# Patient Record
Sex: FEMALE | Race: WHITE | Employment: OTHER | URBAN - METROPOLITAN AREA
[De-identification: names, ages, dates, MRNs, and addresses within clinical notes are randomized per-mention and may not be internally consistent; named-entity substitution may affect disease eponyms.]

---

## 2017-01-01 ENCOUNTER — TELEPHONE (OUTPATIENT)
Dept: GERIATRICS | Facility: CLINIC | Age: 82
End: 2017-01-01

## 2017-01-01 ENCOUNTER — TELEPHONE (OUTPATIENT)
Dept: FAMILY MEDICINE | Facility: CLINIC | Age: 82
End: 2017-01-01

## 2017-01-01 ENCOUNTER — NURSING HOME VISIT (OUTPATIENT)
Dept: GERIATRICS | Facility: CLINIC | Age: 82
End: 2017-01-01
Payer: MEDICARE

## 2017-01-01 ENCOUNTER — APPOINTMENT (OUTPATIENT)
Dept: PHYSICAL THERAPY | Facility: CLINIC | Age: 82
DRG: 065 | End: 2017-01-01
Payer: MEDICARE

## 2017-01-01 ENCOUNTER — APPOINTMENT (OUTPATIENT)
Dept: GENERAL RADIOLOGY | Facility: CLINIC | Age: 82
DRG: 065 | End: 2017-01-01
Attending: EMERGENCY MEDICINE
Payer: MEDICARE

## 2017-01-01 ENCOUNTER — APPOINTMENT (OUTPATIENT)
Dept: CT IMAGING | Facility: CLINIC | Age: 82
DRG: 065 | End: 2017-01-01
Attending: FAMILY MEDICINE
Payer: MEDICARE

## 2017-01-01 ENCOUNTER — OFFICE VISIT (OUTPATIENT)
Dept: AUDIOLOGY | Facility: CLINIC | Age: 82
End: 2017-01-01
Payer: MEDICARE

## 2017-01-01 ENCOUNTER — OFFICE VISIT (OUTPATIENT)
Dept: ORTHOPEDICS | Facility: CLINIC | Age: 82
End: 2017-01-01
Payer: MEDICARE

## 2017-01-01 ENCOUNTER — OFFICE VISIT (OUTPATIENT)
Dept: FAMILY MEDICINE | Facility: CLINIC | Age: 82
End: 2017-01-01
Payer: MEDICARE

## 2017-01-01 ENCOUNTER — CARE COORDINATION (OUTPATIENT)
Dept: CARE COORDINATION | Facility: CLINIC | Age: 82
End: 2017-01-01

## 2017-01-01 ENCOUNTER — APPOINTMENT (OUTPATIENT)
Dept: CT IMAGING | Facility: CLINIC | Age: 82
DRG: 065 | End: 2017-01-01
Attending: EMERGENCY MEDICINE
Payer: MEDICARE

## 2017-01-01 ENCOUNTER — HOSPITAL ENCOUNTER (INPATIENT)
Facility: CLINIC | Age: 82
LOS: 5 days | Discharge: HOSPICE | DRG: 065 | End: 2017-06-01
Attending: EMERGENCY MEDICINE | Admitting: FAMILY MEDICINE
Payer: MEDICARE

## 2017-01-01 ENCOUNTER — ALLIED HEALTH/NURSE VISIT (OUTPATIENT)
Dept: FAMILY MEDICINE | Facility: CLINIC | Age: 82
End: 2017-01-01
Payer: MEDICARE

## 2017-01-01 ENCOUNTER — MEDICAL CORRESPONDENCE (OUTPATIENT)
Dept: HEALTH INFORMATION MANAGEMENT | Facility: CLINIC | Age: 82
End: 2017-01-01

## 2017-01-01 ENCOUNTER — APPOINTMENT (OUTPATIENT)
Dept: GENERAL RADIOLOGY | Facility: CLINIC | Age: 82
DRG: 065 | End: 2017-01-01
Attending: FAMILY MEDICINE
Payer: MEDICARE

## 2017-01-01 ENCOUNTER — APPOINTMENT (OUTPATIENT)
Dept: OCCUPATIONAL THERAPY | Facility: CLINIC | Age: 82
DRG: 065 | End: 2017-01-01
Payer: MEDICARE

## 2017-01-01 ENCOUNTER — OFFICE VISIT (OUTPATIENT)
Dept: NEUROLOGY | Facility: CLINIC | Age: 82
End: 2017-01-01
Payer: MEDICARE

## 2017-01-01 VITALS
HEART RATE: 59 BPM | DIASTOLIC BLOOD PRESSURE: 56 MMHG | TEMPERATURE: 98 F | RESPIRATION RATE: 18 BRPM | BODY MASS INDEX: 23.85 KG/M2 | OXYGEN SATURATION: 90 % | SYSTOLIC BLOOD PRESSURE: 179 MMHG | WEIGHT: 126.32 LBS | HEIGHT: 61 IN

## 2017-01-01 VITALS
HEIGHT: 61 IN | SYSTOLIC BLOOD PRESSURE: 146 MMHG | DIASTOLIC BLOOD PRESSURE: 80 MMHG | WEIGHT: 125 LBS | TEMPERATURE: 98.7 F | BODY MASS INDEX: 23.6 KG/M2 | HEART RATE: 68 BPM | RESPIRATION RATE: 16 BRPM | OXYGEN SATURATION: 92 %

## 2017-01-01 VITALS
SYSTOLIC BLOOD PRESSURE: 135 MMHG | HEART RATE: 68 BPM | RESPIRATION RATE: 16 BRPM | DIASTOLIC BLOOD PRESSURE: 59 MMHG | BODY MASS INDEX: 21.14 KG/M2 | TEMPERATURE: 98.7 F | HEIGHT: 61 IN | OXYGEN SATURATION: 92 % | WEIGHT: 112 LBS

## 2017-01-01 VITALS
SYSTOLIC BLOOD PRESSURE: 110 MMHG | DIASTOLIC BLOOD PRESSURE: 63 MMHG | WEIGHT: 121 LBS | HEART RATE: 86 BPM | HEIGHT: 57 IN | BODY MASS INDEX: 26.1 KG/M2

## 2017-01-01 VITALS
OXYGEN SATURATION: 92 % | DIASTOLIC BLOOD PRESSURE: 80 MMHG | RESPIRATION RATE: 16 BRPM | HEART RATE: 68 BPM | TEMPERATURE: 98.7 F | BODY MASS INDEX: 23.03 KG/M2 | HEIGHT: 61 IN | WEIGHT: 122 LBS | SYSTOLIC BLOOD PRESSURE: 146 MMHG

## 2017-01-01 VITALS
HEIGHT: 57 IN | TEMPERATURE: 98.4 F | HEART RATE: 73 BPM | SYSTOLIC BLOOD PRESSURE: 148 MMHG | BODY MASS INDEX: 26.54 KG/M2 | DIASTOLIC BLOOD PRESSURE: 70 MMHG | WEIGHT: 123 LBS

## 2017-01-01 VITALS
HEART RATE: 81 BPM | DIASTOLIC BLOOD PRESSURE: 79 MMHG | WEIGHT: 127.3 LBS | TEMPERATURE: 97.2 F | SYSTOLIC BLOOD PRESSURE: 149 MMHG | BODY MASS INDEX: 27.46 KG/M2 | HEIGHT: 57 IN | OXYGEN SATURATION: 96 %

## 2017-01-01 VITALS
WEIGHT: 123 LBS | HEIGHT: 57 IN | DIASTOLIC BLOOD PRESSURE: 70 MMHG | BODY MASS INDEX: 26.54 KG/M2 | SYSTOLIC BLOOD PRESSURE: 145 MMHG

## 2017-01-01 DIAGNOSIS — R29.6 FALLS FREQUENTLY: ICD-10-CM

## 2017-01-01 DIAGNOSIS — R29.6 FALLING: ICD-10-CM

## 2017-01-01 DIAGNOSIS — Z71.89 ENCOUNTER FOR HERB AND VITAMIN SUPPLEMENT MANAGEMENT: ICD-10-CM

## 2017-01-01 DIAGNOSIS — L21.9 SEBORRHEIC DERMATITIS OF SCALP: ICD-10-CM

## 2017-01-01 DIAGNOSIS — R44.3 HALLUCINATIONS: Primary | ICD-10-CM

## 2017-01-01 DIAGNOSIS — M17.0 PRIMARY OSTEOARTHRITIS OF BOTH KNEES: ICD-10-CM

## 2017-01-01 DIAGNOSIS — Z51.5 HOSPICE CARE PATIENT: ICD-10-CM

## 2017-01-01 DIAGNOSIS — F03.92: ICD-10-CM

## 2017-01-01 DIAGNOSIS — K59.00 CONSTIPATION, UNSPECIFIED CONSTIPATION TYPE: ICD-10-CM

## 2017-01-01 DIAGNOSIS — H90.3 SENSORINEURAL HEARING LOSS, BILATERAL: Primary | ICD-10-CM

## 2017-01-01 DIAGNOSIS — R52 PAIN: ICD-10-CM

## 2017-01-01 DIAGNOSIS — R44.3 HALLUCINATIONS: ICD-10-CM

## 2017-01-01 DIAGNOSIS — I10 ESSENTIAL HYPERTENSION: ICD-10-CM

## 2017-01-01 DIAGNOSIS — R26.2 DIFFICULTY WALKING: ICD-10-CM

## 2017-01-01 DIAGNOSIS — M51.369 DDD (DEGENERATIVE DISC DISEASE), LUMBAR: ICD-10-CM
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
## 2017-01-01 DIAGNOSIS — R26.81 UNSTEADY GAIT: ICD-10-CM

## 2017-01-01 DIAGNOSIS — Z23 NEED FOR PROPHYLACTIC VACCINATION AGAINST STREPTOCOCCUS PNEUMONIAE (PNEUMOCOCCUS): ICD-10-CM

## 2017-01-01 DIAGNOSIS — H54.7 BLIND: ICD-10-CM

## 2017-01-01 DIAGNOSIS — Z00.00 ROUTINE GENERAL MEDICAL EXAMINATION AT A HEALTH CARE FACILITY: Primary | ICD-10-CM

## 2017-01-01 DIAGNOSIS — R41.0 CONFUSION: Primary | ICD-10-CM

## 2017-01-01 DIAGNOSIS — I63.9 CEREBRAL INFARCTION, UNSPECIFIED MECHANISM (H): Primary | ICD-10-CM

## 2017-01-01 DIAGNOSIS — R11.0 NAUSEA: Primary | ICD-10-CM

## 2017-01-01 DIAGNOSIS — D33.2 BENIGN NEOPLASM OF BRAIN, UNSPECIFIED BRAIN REGION (H): Primary | ICD-10-CM

## 2017-01-01 DIAGNOSIS — R42 DIZZINESS: ICD-10-CM

## 2017-01-01 DIAGNOSIS — R53.83 LETHARGY: ICD-10-CM

## 2017-01-01 DIAGNOSIS — M25.562 PAIN IN BOTH KNEES, UNSPECIFIED CHRONICITY: ICD-10-CM

## 2017-01-01 DIAGNOSIS — B37.0 THRUSH: ICD-10-CM

## 2017-01-01 DIAGNOSIS — M25.469 EFFUSION OF LOWER LEG JOINT: Primary | ICD-10-CM

## 2017-01-01 DIAGNOSIS — R44.1 VISUAL HALLUCINATIONS: Primary | ICD-10-CM

## 2017-01-01 DIAGNOSIS — Z86.011 PERSONAL HISTORY OF BENIGN NEOPLASM OF BRAIN: Primary | ICD-10-CM

## 2017-01-01 DIAGNOSIS — M25.561 PAIN IN BOTH KNEES, UNSPECIFIED CHRONICITY: ICD-10-CM

## 2017-01-01 DIAGNOSIS — H35.30 MACULAR DEGENERATION: ICD-10-CM

## 2017-01-01 DIAGNOSIS — M25.461 EFFUSION OF RIGHT KNEE: ICD-10-CM

## 2017-01-01 DIAGNOSIS — M17.0 PRIMARY OSTEOARTHRITIS OF BOTH KNEES: Primary | ICD-10-CM

## 2017-01-01 DIAGNOSIS — R44.0 AUDITORY HALLUCINATIONS: ICD-10-CM

## 2017-01-01 LAB
ALBUMIN SERPL-MCNC: 3.4 G/DL (ref 3.4–5)
ALBUMIN UR-MCNC: 100 MG/DL
ALBUMIN UR-MCNC: 30 MG/DL
ALP SERPL-CCNC: 118 U/L (ref 40–150)
ALT SERPL W P-5'-P-CCNC: 16 U/L (ref 0–50)
ANION GAP SERPL CALCULATED.3IONS-SCNC: 11 MMOL/L (ref 3–14)
ANION GAP SERPL CALCULATED.3IONS-SCNC: 7 MMOL/L (ref 3–14)
ANION GAP SERPL CALCULATED.3IONS-SCNC: 7 MMOL/L (ref 3–14)
ANION GAP SERPL CALCULATED.3IONS-SCNC: 9 MMOL/L (ref 3–14)
APPEARANCE UR: CLEAR
APPEARANCE UR: CLEAR
AST SERPL W P-5'-P-CCNC: 20 U/L (ref 0–45)
BASOPHILS # BLD AUTO: 0 10E9/L (ref 0–0.2)
BASOPHILS # BLD AUTO: 0.1 10E9/L (ref 0–0.2)
BASOPHILS NFR BLD AUTO: 0.6 %
BASOPHILS NFR BLD AUTO: 1 %
BILIRUB SERPL-MCNC: 0.7 MG/DL (ref 0.2–1.3)
BILIRUB UR QL STRIP: NEGATIVE
BILIRUB UR QL STRIP: NEGATIVE
BUN SERPL-MCNC: 10 MG/DL (ref 7–30)
BUN SERPL-MCNC: 10 MG/DL (ref 7–30)
BUN SERPL-MCNC: 15 MG/DL (ref 7–30)
BUN SERPL-MCNC: 16 MG/DL (ref 7–30)
BUN SERPL-MCNC: 17 MG/DL (ref 7–30)
BUN SERPL-MCNC: 17 MG/DL (ref 7–30)
CALCIUM SERPL-MCNC: 8.6 MG/DL (ref 8.5–10.1)
CALCIUM SERPL-MCNC: 8.7 MG/DL (ref 8.5–10.1)
CALCIUM SERPL-MCNC: 8.8 MG/DL (ref 8.5–10.1)
CALCIUM SERPL-MCNC: 8.8 MG/DL (ref 8.5–10.1)
CALCIUM SERPL-MCNC: 8.9 MG/DL (ref 8.5–10.1)
CALCIUM SERPL-MCNC: 9.2 MG/DL (ref 8.5–10.1)
CHLORIDE SERPL-SCNC: 106 MMOL/L (ref 94–109)
CHLORIDE SERPL-SCNC: 109 MMOL/L (ref 94–109)
CHLORIDE SERPL-SCNC: 110 MMOL/L (ref 94–109)
CO2 SERPL-SCNC: 23 MMOL/L (ref 20–32)
CO2 SERPL-SCNC: 24 MMOL/L (ref 20–32)
CO2 SERPL-SCNC: 25 MMOL/L (ref 20–32)
CO2 SERPL-SCNC: 25 MMOL/L (ref 20–32)
CO2 SERPL-SCNC: 27 MMOL/L (ref 20–32)
CO2 SERPL-SCNC: 29 MMOL/L (ref 20–32)
COLOR UR AUTO: YELLOW
COLOR UR AUTO: YELLOW
CREAT SERPL-MCNC: 0.61 MG/DL (ref 0.52–1.04)
CREAT SERPL-MCNC: 0.62 MG/DL (ref 0.52–1.04)
CREAT SERPL-MCNC: 0.66 MG/DL (ref 0.52–1.04)
CREAT SERPL-MCNC: 0.67 MG/DL (ref 0.52–1.04)
CREAT SERPL-MCNC: 0.73 MG/DL (ref 0.52–1.04)
CREAT SERPL-MCNC: 0.8 MG/DL (ref 0.52–1.04)
D DIMER PPP FEU-MCNC: 0.7 UG/ML FEU (ref 0–0.5)
DIFFERENTIAL METHOD BLD: NORMAL
DIFFERENTIAL METHOD BLD: NORMAL
EOSINOPHIL # BLD AUTO: 0.2 10E9/L (ref 0–0.7)
EOSINOPHIL # BLD AUTO: 0.3 10E9/L (ref 0–0.7)
EOSINOPHIL NFR BLD AUTO: 2.7 %
EOSINOPHIL NFR BLD AUTO: 3.8 %
ERYTHROCYTE [DISTWIDTH] IN BLOOD BY AUTOMATED COUNT: 13.7 % (ref 10–15)
ERYTHROCYTE [DISTWIDTH] IN BLOOD BY AUTOMATED COUNT: 13.8 % (ref 10–15)
ERYTHROCYTE [DISTWIDTH] IN BLOOD BY AUTOMATED COUNT: 13.8 % (ref 10–15)
ERYTHROCYTE [DISTWIDTH] IN BLOOD BY AUTOMATED COUNT: 14 % (ref 10–15)
GFR SERPL CREATININE-BSD FRML MDRD: 66 ML/MIN/1.7M2
GFR SERPL CREATININE-BSD FRML MDRD: 73 ML/MIN/1.7M2
GFR SERPL CREATININE-BSD FRML MDRD: 80 ML/MIN/1.7M2
GFR SERPL CREATININE-BSD FRML MDRD: 82 ML/MIN/1.7M2
GFR SERPL CREATININE-BSD FRML MDRD: 89 ML/MIN/1.7M2
GFR SERPL CREATININE-BSD FRML MDRD: ABNORMAL ML/MIN/1.7M2
GLUCOSE SERPL-MCNC: 111 MG/DL (ref 70–99)
GLUCOSE SERPL-MCNC: 131 MG/DL (ref 70–99)
GLUCOSE SERPL-MCNC: 84 MG/DL (ref 70–99)
GLUCOSE SERPL-MCNC: 90 MG/DL (ref 70–99)
GLUCOSE SERPL-MCNC: 92 MG/DL (ref 70–99)
GLUCOSE SERPL-MCNC: 97 MG/DL (ref 70–99)
GLUCOSE UR STRIP-MCNC: NEGATIVE MG/DL
GLUCOSE UR STRIP-MCNC: NEGATIVE MG/DL
HCT VFR BLD AUTO: 39.2 % (ref 35–47)
HCT VFR BLD AUTO: 40.7 % (ref 35–47)
HCT VFR BLD AUTO: 44.7 % (ref 35–47)
HCT VFR BLD AUTO: 44.8 % (ref 35–47)
HGB BLD-MCNC: 12.6 G/DL (ref 11.7–15.7)
HGB BLD-MCNC: 13.4 G/DL (ref 11.7–15.7)
HGB BLD-MCNC: 14.6 G/DL (ref 11.7–15.7)
HGB BLD-MCNC: 14.8 G/DL (ref 11.7–15.7)
HGB UR QL STRIP: NEGATIVE
HGB UR QL STRIP: NEGATIVE
IMM GRANULOCYTES # BLD: 0 10E9/L (ref 0–0.4)
IMM GRANULOCYTES # BLD: 0 10E9/L (ref 0–0.4)
IMM GRANULOCYTES NFR BLD: 0.2 %
IMM GRANULOCYTES NFR BLD: 0.3 %
KETONES UR STRIP-MCNC: ABNORMAL MG/DL
KETONES UR STRIP-MCNC: NEGATIVE MG/DL
LEUKOCYTE ESTERASE UR QL STRIP: NEGATIVE
LEUKOCYTE ESTERASE UR QL STRIP: NEGATIVE
LYMPHOCYTES # BLD AUTO: 1.5 10E9/L (ref 0.8–5.3)
LYMPHOCYTES # BLD AUTO: 1.8 10E9/L (ref 0.8–5.3)
LYMPHOCYTES NFR BLD AUTO: 25.4 %
LYMPHOCYTES NFR BLD AUTO: 25.5 %
MCH RBC QN AUTO: 30.8 PG (ref 26.5–33)
MCH RBC QN AUTO: 30.9 PG (ref 26.5–33)
MCH RBC QN AUTO: 31.2 PG (ref 26.5–33)
MCH RBC QN AUTO: 31.4 PG (ref 26.5–33)
MCHC RBC AUTO-ENTMCNC: 32.1 G/DL (ref 31.5–36.5)
MCHC RBC AUTO-ENTMCNC: 32.6 G/DL (ref 31.5–36.5)
MCHC RBC AUTO-ENTMCNC: 32.9 G/DL (ref 31.5–36.5)
MCHC RBC AUTO-ENTMCNC: 33.1 G/DL (ref 31.5–36.5)
MCV RBC AUTO: 95 FL (ref 78–100)
MCV RBC AUTO: 96 FL (ref 78–100)
MONOCYTES # BLD AUTO: 0.8 10E9/L (ref 0–1.3)
MONOCYTES # BLD AUTO: 0.9 10E9/L (ref 0–1.3)
MONOCYTES NFR BLD AUTO: 11.5 %
MONOCYTES NFR BLD AUTO: 14.9 %
MUCOUS THREADS #/AREA URNS LPF: PRESENT /LPF
NEUTROPHILS # BLD AUTO: 3.3 10E9/L (ref 1.6–8.3)
NEUTROPHILS # BLD AUTO: 4.1 10E9/L (ref 1.6–8.3)
NEUTROPHILS NFR BLD AUTO: 55.7 %
NEUTROPHILS NFR BLD AUTO: 58.4 %
NITRATE UR QL: NEGATIVE
NITRATE UR QL: NEGATIVE
PH UR STRIP: 5.5 PH (ref 5–7)
PH UR STRIP: 8 PH (ref 5–7)
PLATELET # BLD AUTO: 211 10E9/L (ref 150–450)
PLATELET # BLD AUTO: 220 10E9/L (ref 150–450)
PLATELET # BLD AUTO: 224 10E9/L (ref 150–450)
PLATELET # BLD AUTO: 231 10E9/L (ref 150–450)
POTASSIUM SERPL-SCNC: 3.7 MMOL/L (ref 3.4–5.3)
POTASSIUM SERPL-SCNC: 3.8 MMOL/L (ref 3.4–5.3)
POTASSIUM SERPL-SCNC: 3.8 MMOL/L (ref 3.4–5.3)
POTASSIUM SERPL-SCNC: 3.9 MMOL/L (ref 3.4–5.3)
POTASSIUM SERPL-SCNC: 4 MMOL/L (ref 3.4–5.3)
POTASSIUM SERPL-SCNC: 4 MMOL/L (ref 3.4–5.3)
PROT SERPL-MCNC: 6.5 G/DL (ref 6.8–8.8)
RBC # BLD AUTO: 4.09 10E12/L (ref 3.8–5.2)
RBC # BLD AUTO: 4.3 10E12/L (ref 3.8–5.2)
RBC # BLD AUTO: 4.71 10E12/L (ref 3.8–5.2)
RBC # BLD AUTO: 4.73 10E12/L (ref 3.8–5.2)
RBC #/AREA URNS AUTO: 1 /HPF (ref 0–2)
RBC #/AREA URNS AUTO: NORMAL /HPF (ref 0–2)
SODIUM SERPL-SCNC: 140 MMOL/L (ref 133–144)
SODIUM SERPL-SCNC: 142 MMOL/L (ref 133–144)
SODIUM SERPL-SCNC: 143 MMOL/L (ref 133–144)
SODIUM SERPL-SCNC: 144 MMOL/L (ref 133–144)
SODIUM SERPL-SCNC: 145 MMOL/L (ref 133–144)
SODIUM SERPL-SCNC: 146 MMOL/L (ref 133–144)
SP GR UR STRIP: 1.01 (ref 1–1.03)
SP GR UR STRIP: >1.03 (ref 1–1.03)
SQUAMOUS #/AREA URNS AUTO: <1 /HPF (ref 0–1)
TROPONIN I SERPL-MCNC: NORMAL UG/L (ref 0–0.04)
TROPONIN I SERPL-MCNC: NORMAL UG/L (ref 0–0.04)
URN SPEC COLLECT METH UR: ABNORMAL
URN SPEC COLLECT METH UR: ABNORMAL
UROBILINOGEN UR STRIP-ACNC: 0.2 EU/DL (ref 0.2–1)
UROBILINOGEN UR STRIP-MCNC: NORMAL MG/DL (ref 0–2)
WBC # BLD AUTO: 6 10E9/L (ref 4–11)
WBC # BLD AUTO: 6.9 10E9/L (ref 4–11)
WBC # BLD AUTO: 6.9 10E9/L (ref 4–11)
WBC # BLD AUTO: 7.4 10E9/L (ref 4–11)
WBC #/AREA URNS AUTO: 1 /HPF (ref 0–2)
WBC #/AREA URNS AUTO: NORMAL /HPF (ref 0–2)

## 2017-01-01 PROCEDURE — 99232 SBSQ HOSP IP/OBS MODERATE 35: CPT | Performed by: FAMILY MEDICINE

## 2017-01-01 PROCEDURE — 99214 OFFICE O/P EST MOD 30 MIN: CPT | Performed by: NURSE PRACTITIONER

## 2017-01-01 PROCEDURE — V5299 HEARING SERVICE: HCPCS | Performed by: AUDIOLOGIST

## 2017-01-01 PROCEDURE — 97530 THERAPEUTIC ACTIVITIES: CPT | Mod: GP | Performed by: PHYSICAL THERAPIST

## 2017-01-01 PROCEDURE — 85025 COMPLETE CBC W/AUTO DIFF WBC: CPT | Performed by: EMERGENCY MEDICINE

## 2017-01-01 PROCEDURE — 36415 COLL VENOUS BLD VENIPUNCTURE: CPT | Performed by: NURSE PRACTITIONER

## 2017-01-01 PROCEDURE — 25000128 H RX IP 250 OP 636: Performed by: FAMILY MEDICINE

## 2017-01-01 PROCEDURE — A9270 NON-COVERED ITEM OR SERVICE: HCPCS | Mod: GY | Performed by: FAMILY MEDICINE

## 2017-01-01 PROCEDURE — 81001 URINALYSIS AUTO W/SCOPE: CPT | Performed by: FAMILY MEDICINE

## 2017-01-01 PROCEDURE — 70450 CT HEAD/BRAIN W/O DYE: CPT

## 2017-01-01 PROCEDURE — 36415 COLL VENOUS BLD VENIPUNCTURE: CPT | Performed by: FAMILY MEDICINE

## 2017-01-01 PROCEDURE — G0009 ADMIN PNEUMOCOCCAL VACCINE: HCPCS | Performed by: NURSE PRACTITIONER

## 2017-01-01 PROCEDURE — 80048 BASIC METABOLIC PNL TOTAL CA: CPT | Performed by: EMERGENCY MEDICINE

## 2017-01-01 PROCEDURE — 80048 BASIC METABOLIC PNL TOTAL CA: CPT | Performed by: FAMILY MEDICINE

## 2017-01-01 PROCEDURE — 25000131 ZZH RX MED GY IP 250 OP 636 PS 637: Mod: GY | Performed by: FAMILY MEDICINE

## 2017-01-01 PROCEDURE — V5014 HEARING AID REPAIR/MODIFYING: HCPCS | Performed by: AUDIOLOGIST

## 2017-01-01 PROCEDURE — 12000000 ZZH R&B MED SURG/OB

## 2017-01-01 PROCEDURE — 80053 COMPREHEN METABOLIC PANEL: CPT | Performed by: FAMILY MEDICINE

## 2017-01-01 PROCEDURE — 99309 SBSQ NF CARE MODERATE MDM 30: CPT | Mod: GW | Performed by: NURSE PRACTITIONER

## 2017-01-01 PROCEDURE — G0378 HOSPITAL OBSERVATION PER HR: HCPCS

## 2017-01-01 PROCEDURE — 99221 1ST HOSP IP/OBS SF/LOW 40: CPT | Performed by: NURSE PRACTITIONER

## 2017-01-01 PROCEDURE — 25000132 ZZH RX MED GY IP 250 OP 250 PS 637: Mod: GY | Performed by: INTERNAL MEDICINE

## 2017-01-01 PROCEDURE — 40000133 ZZH STATISTIC OT WARD VISIT

## 2017-01-01 PROCEDURE — 20611 DRAIN/INJ JOINT/BURSA W/US: CPT | Mod: 50 | Performed by: FAMILY MEDICINE

## 2017-01-01 PROCEDURE — A9270 NON-COVERED ITEM OR SERVICE: HCPCS | Mod: GY | Performed by: INTERNAL MEDICINE

## 2017-01-01 PROCEDURE — 25000132 ZZH RX MED GY IP 250 OP 250 PS 637: Mod: GY | Performed by: FAMILY MEDICINE

## 2017-01-01 PROCEDURE — 96360 HYDRATION IV INFUSION INIT: CPT | Performed by: EMERGENCY MEDICINE

## 2017-01-01 PROCEDURE — 99233 SBSQ HOSP IP/OBS HIGH 50: CPT | Performed by: FAMILY MEDICINE

## 2017-01-01 PROCEDURE — 93005 ELECTROCARDIOGRAM TRACING: CPT

## 2017-01-01 PROCEDURE — 97162 PT EVAL MOD COMPLEX 30 MIN: CPT | Mod: GP | Performed by: PHYSICAL THERAPIST

## 2017-01-01 PROCEDURE — 99285 EMERGENCY DEPT VISIT HI MDM: CPT | Mod: 25 | Performed by: EMERGENCY MEDICINE

## 2017-01-01 PROCEDURE — 85027 COMPLETE CBC AUTOMATED: CPT | Performed by: FAMILY MEDICINE

## 2017-01-01 PROCEDURE — 72125 CT NECK SPINE W/O DYE: CPT

## 2017-01-01 PROCEDURE — 85025 COMPLETE CBC W/AUTO DIFF WBC: CPT | Performed by: FAMILY MEDICINE

## 2017-01-01 PROCEDURE — 97110 THERAPEUTIC EXERCISES: CPT | Mod: GP | Performed by: PHYSICAL THERAPIST

## 2017-01-01 PROCEDURE — 99207 ZZC CDG-CORRECTLY CODED, REVIEWED AND AGREE: CPT | Performed by: NURSE PRACTITIONER

## 2017-01-01 PROCEDURE — 40000193 ZZH STATISTIC PT WARD VISIT: Performed by: PHYSICAL THERAPIST

## 2017-01-01 PROCEDURE — 25000125 ZZHC RX 250: Performed by: RADIOLOGY

## 2017-01-01 PROCEDURE — 85379 FIBRIN DEGRADATION QUANT: CPT | Performed by: FAMILY MEDICINE

## 2017-01-01 PROCEDURE — 96361 HYDRATE IV INFUSION ADD-ON: CPT | Performed by: EMERGENCY MEDICINE

## 2017-01-01 PROCEDURE — G0439 PPPS, SUBSEQ VISIT: HCPCS | Performed by: NURSE PRACTITIONER

## 2017-01-01 PROCEDURE — 99222 1ST HOSP IP/OBS MODERATE 55: CPT | Mod: AI | Performed by: FAMILY MEDICINE

## 2017-01-01 PROCEDURE — 80048 BASIC METABOLIC PNL TOTAL CA: CPT | Performed by: NURSE PRACTITIONER

## 2017-01-01 PROCEDURE — 97116 GAIT TRAINING THERAPY: CPT | Mod: GP | Performed by: PHYSICAL THERAPIST

## 2017-01-01 PROCEDURE — 25000125 ZZHC RX 250: Performed by: FAMILY MEDICINE

## 2017-01-01 PROCEDURE — 12000007 ZZH R&B INTERMEDIATE

## 2017-01-01 PROCEDURE — 99310 SBSQ NF CARE HIGH MDM 45: CPT | Mod: GW | Performed by: NURSE PRACTITIONER

## 2017-01-01 PROCEDURE — 71260 CT THORAX DX C+: CPT

## 2017-01-01 PROCEDURE — 99285 EMERGENCY DEPT VISIT HI MDM: CPT | Mod: Z6 | Performed by: EMERGENCY MEDICINE

## 2017-01-01 PROCEDURE — 71020 XR CHEST 2 VW: CPT

## 2017-01-01 PROCEDURE — 99239 HOSP IP/OBS DSCHRG MGMT >30: CPT | Performed by: FAMILY MEDICINE

## 2017-01-01 PROCEDURE — 25000128 H RX IP 250 OP 636: Performed by: RADIOLOGY

## 2017-01-01 PROCEDURE — 90670 PCV13 VACCINE IM: CPT | Performed by: NURSE PRACTITIONER

## 2017-01-01 PROCEDURE — 73560 X-RAY EXAM OF KNEE 1 OR 2: CPT | Mod: RT

## 2017-01-01 PROCEDURE — 25000128 H RX IP 250 OP 636: Performed by: EMERGENCY MEDICINE

## 2017-01-01 PROCEDURE — 84484 ASSAY OF TROPONIN QUANT: CPT | Performed by: FAMILY MEDICINE

## 2017-01-01 PROCEDURE — 99213 OFFICE O/P EST LOW 20 MIN: CPT | Mod: 25 | Performed by: FAMILY MEDICINE

## 2017-01-01 PROCEDURE — 99207 ZZC NO CHARGE NURSE ONLY: CPT

## 2017-01-01 PROCEDURE — 97166 OT EVAL MOD COMPLEX 45 MIN: CPT | Mod: GO

## 2017-01-01 PROCEDURE — 81001 URINALYSIS AUTO W/SCOPE: CPT | Performed by: NURSE PRACTITIONER

## 2017-01-01 PROCEDURE — 99204 OFFICE O/P NEW MOD 45 MIN: CPT | Performed by: PSYCHIATRY & NEUROLOGY

## 2017-01-01 RX ORDER — LATANOPROST 50 UG/ML
1 SOLUTION/ DROPS OPHTHALMIC AT BEDTIME
Status: DISCONTINUED | OUTPATIENT
Start: 2017-01-01 | End: 2017-01-01 | Stop reason: HOSPADM

## 2017-01-01 RX ORDER — ACETAMINOPHEN 325 MG/1
650 TABLET ORAL EVERY 4 HOURS PRN
Status: DISCONTINUED | OUTPATIENT
Start: 2017-01-01 | End: 2017-01-01

## 2017-01-01 RX ORDER — LOSARTAN POTASSIUM 25 MG/1
25 TABLET ORAL ONCE
Status: COMPLETED | OUTPATIENT
Start: 2017-01-01 | End: 2017-01-01

## 2017-01-01 RX ORDER — ONDANSETRON 2 MG/ML
4 INJECTION INTRAMUSCULAR; INTRAVENOUS EVERY 6 HOURS PRN
Status: DISCONTINUED | OUTPATIENT
Start: 2017-01-01 | End: 2017-01-01 | Stop reason: HOSPADM

## 2017-01-01 RX ORDER — NYSTATIN 100000/ML
500000 SUSPENSION, ORAL (FINAL DOSE FORM) ORAL 4 TIMES DAILY
COMMUNITY

## 2017-01-01 RX ORDER — NALOXONE HYDROCHLORIDE 0.4 MG/ML
.1-.4 INJECTION, SOLUTION INTRAMUSCULAR; INTRAVENOUS; SUBCUTANEOUS
Status: DISCONTINUED | OUTPATIENT
Start: 2017-01-01 | End: 2017-01-01 | Stop reason: HOSPADM

## 2017-01-01 RX ORDER — LOSARTAN POTASSIUM 25 MG/1
25 TABLET ORAL DAILY
Status: DISCONTINUED | OUTPATIENT
Start: 2017-01-01 | End: 2017-01-01

## 2017-01-01 RX ORDER — SCOLOPAMINE TRANSDERMAL SYSTEM 1 MG/1
1 PATCH, EXTENDED RELEASE TRANSDERMAL
COMMUNITY

## 2017-01-01 RX ORDER — LIDOCAINE 40 MG/G
CREAM TOPICAL
Status: DISCONTINUED | OUTPATIENT
Start: 2017-01-01 | End: 2017-01-01 | Stop reason: HOSPADM

## 2017-01-01 RX ORDER — PROCHLORPERAZINE MALEATE 5 MG
5 TABLET ORAL EVERY 6 HOURS PRN
Status: DISCONTINUED | OUTPATIENT
Start: 2017-01-01 | End: 2017-01-01 | Stop reason: HOSPADM

## 2017-01-01 RX ORDER — AMOXICILLIN 250 MG
2 CAPSULE ORAL DAILY
COMMUNITY

## 2017-01-01 RX ORDER — NALOXONE HYDROCHLORIDE 0.4 MG/ML
.1-.4 INJECTION, SOLUTION INTRAMUSCULAR; INTRAVENOUS; SUBCUTANEOUS
Status: DISCONTINUED | OUTPATIENT
Start: 2017-01-01 | End: 2017-01-01

## 2017-01-01 RX ORDER — ACETAMINOPHEN 325 MG/1
650 TABLET ORAL EVERY 4 HOURS PRN
Status: DISCONTINUED | OUTPATIENT
Start: 2017-01-01 | End: 2017-01-01 | Stop reason: HOSPADM

## 2017-01-01 RX ORDER — KETOCONAZOLE 20 MG/ML
SHAMPOO TOPICAL
Qty: 120 ML | Refills: 1 | Status: SHIPPED | OUTPATIENT
Start: 2017-01-01

## 2017-01-01 RX ORDER — TRIAMCINOLONE ACETONIDE 40 MG/ML
80 INJECTION, SUSPENSION INTRA-ARTICULAR; INTRAMUSCULAR ONCE
Qty: 2 ML | Refills: 0 | OUTPATIENT
Start: 2017-01-01 | End: 2017-01-01

## 2017-01-01 RX ORDER — LOSARTAN POTASSIUM 25 MG/1
25 TABLET ORAL DAILY
Qty: 90 TABLET | Refills: 3 | Status: ON HOLD | OUTPATIENT
Start: 2017-01-01 | End: 2017-01-01

## 2017-01-01 RX ORDER — IOPAMIDOL 755 MG/ML
65 INJECTION, SOLUTION INTRAVASCULAR ONCE
Status: COMPLETED | OUTPATIENT
Start: 2017-01-01 | End: 2017-01-01

## 2017-01-01 RX ORDER — OLANZAPINE 5 MG/1
5 TABLET, ORALLY DISINTEGRATING ORAL ONCE
Status: COMPLETED | OUTPATIENT
Start: 2017-01-01 | End: 2017-01-01

## 2017-01-01 RX ORDER — PREDNISOLONE ACETATE 10 MG/ML
1 SUSPENSION/ DROPS OPHTHALMIC
Status: DISCONTINUED | OUTPATIENT
Start: 2017-01-01 | End: 2017-01-01 | Stop reason: HOSPADM

## 2017-01-01 RX ORDER — MECLIZINE HCL 12.5 MG 12.5 MG/1
12.5 TABLET ORAL 3 TIMES DAILY PRN
Status: DISCONTINUED | OUTPATIENT
Start: 2017-01-01 | End: 2017-01-01 | Stop reason: HOSPADM

## 2017-01-01 RX ORDER — MULTIPLE VITAMINS W/ MINERALS TAB 9MG-400MCG
1 TAB ORAL DAILY
Qty: 90 TABLET | Refills: 3 | Status: SHIPPED | OUTPATIENT
Start: 2017-01-01

## 2017-01-01 RX ORDER — ONDANSETRON 4 MG/1
4 TABLET, ORALLY DISINTEGRATING ORAL EVERY 6 HOURS PRN
Status: DISCONTINUED | OUTPATIENT
Start: 2017-01-01 | End: 2017-01-01 | Stop reason: HOSPADM

## 2017-01-01 RX ORDER — LOSARTAN POTASSIUM 50 MG/1
50 TABLET ORAL DAILY
Status: DISCONTINUED | OUTPATIENT
Start: 2017-01-01 | End: 2017-01-01 | Stop reason: HOSPADM

## 2017-01-01 RX ORDER — PROCHLORPERAZINE 25 MG
12.5 SUPPOSITORY, RECTAL RECTAL EVERY 12 HOURS PRN
Status: DISCONTINUED | OUTPATIENT
Start: 2017-01-01 | End: 2017-01-01 | Stop reason: HOSPADM

## 2017-01-01 RX ORDER — OLANZAPINE 2.5 MG/1
1.25 TABLET, FILM COATED ORAL DAILY
Qty: 60 TABLET | Refills: 0 | Status: SHIPPED | OUTPATIENT
Start: 2017-01-01 | End: 2017-01-01

## 2017-01-01 RX ORDER — NITROGLYCERIN 0.4 MG/1
0.4 TABLET SUBLINGUAL EVERY 5 MIN PRN
Status: DISCONTINUED | OUTPATIENT
Start: 2017-01-01 | End: 2017-01-01 | Stop reason: HOSPADM

## 2017-01-01 RX ADMIN — ENOXAPARIN SODIUM 30 MG: 30 INJECTION SUBCUTANEOUS at 08:43

## 2017-01-01 RX ADMIN — ACETAMINOPHEN 650 MG: 325 TABLET, FILM COATED ORAL at 08:40

## 2017-01-01 RX ADMIN — OMEPRAZOLE 20 MG: 20 CAPSULE, DELAYED RELEASE ORAL at 20:27

## 2017-01-01 RX ADMIN — LOSARTAN POTASSIUM 50 MG: 50 TABLET ORAL at 08:58

## 2017-01-01 RX ADMIN — OMEPRAZOLE 20 MG: 20 CAPSULE, DELAYED RELEASE ORAL at 19:58

## 2017-01-01 RX ADMIN — OMEPRAZOLE 20 MG: 20 CAPSULE, DELAYED RELEASE ORAL at 19:15

## 2017-01-01 RX ADMIN — OLANZAPINE 1.25 MG: 2.5 TABLET ORAL at 19:15

## 2017-01-01 RX ADMIN — ENOXAPARIN SODIUM 30 MG: 30 INJECTION SUBCUTANEOUS at 08:54

## 2017-01-01 RX ADMIN — LATANOPROST 1 DROP: 50 SOLUTION/ DROPS OPHTHALMIC at 20:16

## 2017-01-01 RX ADMIN — MECLIZINE HCL 12.5 MG: 12.5 TABLET ORAL at 15:02

## 2017-01-01 RX ADMIN — LOSARTAN POTASSIUM 25 MG: 25 TABLET, FILM COATED ORAL at 08:40

## 2017-01-01 RX ADMIN — OMEPRAZOLE 20 MG: 20 CAPSULE, DELAYED RELEASE ORAL at 20:17

## 2017-01-01 RX ADMIN — MECLIZINE HCL 12.5 MG: 12.5 TABLET ORAL at 10:38

## 2017-01-01 RX ADMIN — SODIUM CHLORIDE 1000 ML: 9 INJECTION, SOLUTION INTRAVENOUS at 21:41

## 2017-01-01 RX ADMIN — ENOXAPARIN SODIUM 30 MG: 30 INJECTION SUBCUTANEOUS at 09:05

## 2017-01-01 RX ADMIN — IOPAMIDOL 65 ML: 755 INJECTION, SOLUTION INTRAVENOUS at 16:22

## 2017-01-01 RX ADMIN — LATANOPROST 1 DROP: 50 SOLUTION/ DROPS OPHTHALMIC at 22:49

## 2017-01-01 RX ADMIN — LIDOCAINE HYDROCHLORIDE 30 ML: 20 SOLUTION ORAL; TOPICAL at 10:51

## 2017-01-01 RX ADMIN — SODIUM CHLORIDE 93 ML: 9 INJECTION, SOLUTION INTRAVENOUS at 16:22

## 2017-01-01 RX ADMIN — ACETAMINOPHEN 650 MG: 325 TABLET, FILM COATED ORAL at 06:47

## 2017-01-01 RX ADMIN — LOSARTAN POTASSIUM 50 MG: 50 TABLET ORAL at 08:49

## 2017-01-01 RX ADMIN — LOSARTAN POTASSIUM 25 MG: 25 TABLET, FILM COATED ORAL at 08:10

## 2017-01-01 RX ADMIN — ACETAMINOPHEN 650 MG: 325 TABLET, FILM COATED ORAL at 08:14

## 2017-01-01 RX ADMIN — LATANOPROST 1 DROP: 50 SOLUTION/ DROPS OPHTHALMIC at 22:57

## 2017-01-01 RX ADMIN — PREDNISOLONE ACETATE 1 DROP: 10 SUSPENSION/ DROPS OPHTHALMIC at 20:29

## 2017-01-01 RX ADMIN — ACETAMINOPHEN 650 MG: 325 TABLET, FILM COATED ORAL at 20:17

## 2017-01-01 RX ADMIN — ENOXAPARIN SODIUM 30 MG: 30 INJECTION SUBCUTANEOUS at 08:58

## 2017-01-01 RX ADMIN — LOSARTAN POTASSIUM 50 MG: 50 TABLET ORAL at 08:54

## 2017-01-01 RX ADMIN — ENOXAPARIN SODIUM 30 MG: 30 INJECTION SUBCUTANEOUS at 10:49

## 2017-01-01 RX ADMIN — LOSARTAN POTASSIUM 25 MG: 25 TABLET, FILM COATED ORAL at 17:10

## 2017-01-01 RX ADMIN — OLANZAPINE 5 MG: 5 TABLET, ORALLY DISINTEGRATING ORAL at 20:17

## 2017-01-01 RX ADMIN — ACETAMINOPHEN 650 MG: 325 TABLET, FILM COATED ORAL at 08:54

## 2017-01-01 RX ADMIN — OMEPRAZOLE 20 MG: 20 CAPSULE, DELAYED RELEASE ORAL at 20:31

## 2017-01-01 RX ADMIN — LOSARTAN POTASSIUM 50 MG: 50 TABLET ORAL at 08:06

## 2017-01-01 ASSESSMENT — ACTIVITIES OF DAILY LIVING (ADL)
WHICH_OF_THE_ABOVE_FUNCTIONAL_RISKS_HAD_A_RECENT_ONSET_OR_CHANGE?: AMBULATION;TRANSFERRING;TOILETING;BATHING;DRESSING;EATING

## 2017-01-01 ASSESSMENT — PAIN DESCRIPTION - DESCRIPTORS: DESCRIPTORS: ACHING;BURNING;DISCOMFORT;DULL

## 2017-01-02 NOTE — TELEPHONE ENCOUNTER
Form from Anna amaya Nunda-Physician Order Sheet-Orders were signed, faxed and sent to [facility].    [facility]

## 2017-01-03 NOTE — NURSING NOTE
"Chief Complaint   Patient presents with     Medicare Visit     Orders     Requesting Order for wheelchair      Imm/Inj     prevnar 13        Initial /79 mmHg  Pulse 81  Temp(Src) 97.2  F (36.2  C) (Oral)  Ht 4' 9\" (1.448 m)  Wt 127 lb 4.8 oz (57.743 kg)  BMI 27.54 kg/m2  SpO2 96% Estimated body mass index is 27.54 kg/(m^2) as calculated from the following:    Height as of this encounter: 4' 9\" (1.448 m).    Weight as of this encounter: 127 lb 4.8 oz (57.743 kg).  BP completed using cuff size: regular    "

## 2017-01-03 NOTE — PATIENT INSTRUCTIONS
Preventive Health Recommendations    Female Ages 65 +    Yearly exam:     See your health care provider every year in order to  o Review health changes.   o Discuss preventive care.    o Review your medicines if your doctor has prescribed any.      You no longer need a yearly Pap test unless you've had an abnormal Pap test in the past 10 years. If you have vaginal symptoms, such as bleeding or discharge, be sure to talk with your provider about a Pap test.      Every 1 to 2 years, have a mammogram.  If you are over 69, talk with your health care provider about whether or not you want to continue having screening mammograms.      Every 10 years, have a colonoscopy. Or, have a yearly FIT test (stool test). These exams will check for colon cancer.       Have a cholesterol test every 5 years, or more often if your doctor advises it.       Have a diabetes test (fasting glucose) every three years. If you are at risk for diabetes, you should have this test more often.       At age 65, have a bone density scan (DEXA) to check for osteoporosis (brittle bone disease).    Shots:    Get a flu shot each year.    Get a tetanus shot every 10 years.    Talk to your doctor about your pneumonia vaccines. There are now two you should receive - Pneumovax (PPSV 23) and Prevnar (PCV 13).    Talk to your doctor about the shingles vaccine.    Talk to your doctor about the hepatitis B vaccine.    Nutrition:     Eat at least 5 servings of fruits and vegetables each day.      Eat whole-grain bread, whole-wheat pasta and brown rice instead of white grains and rice.      Talk to your provider about Calcium and Vitamin D.     Lifestyle    Exercise at least 150 minutes a week (30 minutes a day, 5 days a week). This will help you control your weight and prevent disease.      Limit alcohol to one drink per day.      No smoking.       Wear sunscreen to prevent skin cancer.       See your dentist twice a year for an exam and cleaning.      See your  eye doctor every 1 to 2 years to screen for conditions such as glaucoma, macular degeneration and cataracts.        Thank you for choosing [facility].  You may be receiving a survey in the mail from Shira Choudh[employer] regarding your visit today.  Please take a few minutes to complete and return the survey to let us know how we are doing.      If you have questions or concerns, please contact us via [employer] or you can contact your care team at [phone].    Our Clinic hours are:  Monday 6:40 am  to 7:00 pm  Tuesday -Friday 6:40 am to 5:00 pm    The [place] outpatient lab hours are:  Monday - Friday 6:10 am to 4:45 pm  Saturdays 7:00 am to 11:00 am  Appointments are required, call [phone]    If you have clinical questions after hours or would like to schedule an appointment,  call the clinic at [phone].

## 2017-01-03 NOTE — Clinical Note
[facility]  [address] [place] [address]  Phone: [phone]    January 4, 2017    Angela IVET Vaibhav  [address]    [place] [address]          Dear Ms. Esposito,    The results of your recent lab tests were within normal limits.  Component      Latest Ref Rng 1/3/2017   Sodium      133 - 144 mmol/L 140   Potassium      3.4 - 5.3 mmol/L 4.0   Chloride      94 - 109 mmol/L 106   Carbon Dioxide      20 - 32 mmol/L 27   Anion Gap      3 - 14 mmol/L 7   Glucose      70 - 99 mg/dL 90   Urea Nitrogen      7 - 30 mg/dL 17   Creatinine      0.52 - 1.04 mg/dL 0.73   GFR Estimate      >60 mL/min/1.7m2 73   GFR Estimate If Black      >60 mL/min/1.7m2 88   Calcium      8.5 - 10.1 mg/dL 9.2    .  If you have any further questions or problems, please contact our office.    Sincerely,      LIZETTE Hernandez / veronika

## 2017-01-03 NOTE — PROGRESS NOTES
SUBJECTIVE:                                                            Angela Esposito is a 89 year old female who presents for Preventive Visit.  Chief Complaint   Patient presents with     Medicare Visit     Orders     Requesting Order for wheelchair   Has chronic back pain.  More balance issues lately.  Has fallen a few times.  Wants to stay at her assisted living apartment     Imm/Inj     prevnar 13 /tdap ??      Hair/Scalp Problem     itchy bumps on scalp        Are you in the first 12 months of your Medicare Part B coverage?  No    Healthy Habits:    Do you get at least three servings of calcium containing foods daily (dairy, green leafy vegetables, etc.)? No, takes calcium with vitamin d daily, unknown dose    Amount of exercise or daily activities, outside of work: very little due to past Falls    Problems taking medications regularly No    Medication side effects: No    Have you had an eye exam in the past two years? Yes, sees Eye Doctor every 3 months     Do you see a dentist twice per year? yes    Do you have sleep apnea, excessive snoring or daytime drowsiness?Does Not Sleep Well At Night     COGNITIVE SCREEN  1) Repeat 3 items (Banana, Sunrise, Chair)    2) Clock draw: unable to do due to macular degeneration.   3) 3 item recall: Recalls 1 object   Results: ABNORMAL clock, 1-2 items recalled: PROBABLE COGNITIVE IMPAIRMENT, **INFORM PROVIDER**    Mini-CogTM Copyright JD Yun. Licensed by the author for use in [facility]; reprinted with permission (farideh@.[email or web address]). All rights reserved.            Hypertension Follow-up      Outpatient blood pressures are not being checked.    Low Salt Diet: no added salt       All Histories reviewed and updated in EPIC as appropriate.  Social History   Substance Use Topics     Smoking status: Never Smoker      Smokeless tobacco: Never Used     Alcohol Use: No       The patient does not drink >3 drinks per day nor >7 drinks per week.    Today's PHQ-2  "Score:   PHQ-2 ( 1999 Pfizer) 1/3/2017 2/9/2016   Q1: Little interest or pleasure in doing things 0 0   Q2: Feeling down, depressed or hopeless 0 0   PHQ-2 Score 0 0       Do you feel safe in your environment - Sometimes\" she states someone knocks at her door during the night\" Her Son who is with her today states she gets her days and nights mixed up at times.    Do you have a Health Care Directive?: Yes: Advance Directive has been received and scanned.    Current providers sharing in care for this patient include:   Patient Care Team:  Devi May APRN CNP as PCP - General (Nurse Practitioner)      Hearing impairment: Yes, wears hearing aides     Ability to successfully perform activities of daily living: No, needs assistance with: phone, transportation, shopping, preparing meals, housework, bathing, laundry, medications and managing money     Fall risk:  Fallen 2 or more times in the past year?: Yes  Any fall with injury in the past year?: Yes  Timed Up and Go Test (>13.5 is fall risk; contact physician) :  (unable to walk without assistance)    Home safety:  none identified      The following health maintenance items are reviewed in Epic and correct as of today:  Health Maintenance   Topic Date Due     MEDICARE ANNUAL WELLNESS VISIT  12/09/1982     PNEUMOCOCCAL (2 of 2 - PCV13) 08/30/2008     FALL RISK ASSESSMENT  02/09/2017     INFLUENZA VACCINE (SYSTEM ASSIGNED)  09/01/2017     ADVANCE DIRECTIVE PLANNING Q5 YRS (NO INBASKET)  06/24/2018     COLONOSCOPY Q10 YR INBASKET MESSAGE  01/07/2024         Pneumonia Vaccine:Adults age 65+ who received their first dose of Pneumovax (PPSV23) prior to age 65 years: Should be given PCV 13 > 1 year after their most recent PPSV23 AND should be given a another dose of PPSV23 > 5 years after their most recent dose of PPSV23     ROS:  Constitutional, HEENT, cardiovascular, pulmonary, GI, , musculoskeletal, neuro, skin, endocrine and psych systems are negative, " "except as otherwise noted.    Problem list, Medication list, Allergies, and Medical/Social/Surgical histories reviewed in [facility] and updated as appropriate.      OBJECTIVE:                                                            /79 mmHg  Pulse 81  Temp(Src) 97.2  F (36.2  C) (Oral)  Ht 4' 9\" (1.448 m)  Wt 127 lb 4.8 oz (57.743 kg)  BMI 27.54 kg/m2  SpO2 96% Estimated body mass index is 27.54 kg/(m^2) as calculated from the following:    Height as of this encounter: 4' 9\" (1.448 m).    Weight as of this encounter: 127 lb 4.8 oz (57.743 kg).  EXAM:   GENERAL: healthy, alert and no distress  EYES: Eyes grossly normal to inspection, PERRL and conjunctivae and sclerae normal  HENT: ear canals and TM's normal, nose and mouth without ulcers or lesions  NECK: no adenopathy, no asymmetry, masses, or scars and thyroid normal to palpation  RESP: lungs clear to auscultation - no rales, rhonchi or wheezes  CV: regular rate and rhythm, normal S1 S2, no S3 or S4, no murmur, click or rub, no peripheral edema and peripheral pulses strong  ABDOMEN: soft, nontender, no hepatosplenomegaly, no masses and bowel sounds normal  SKIN: mild seborrheic dermatitis on scalp  PSYCH: mentation appears normal, affect normal/bright    ASSESSMENT / PLAN:                                                                ICD-10-CM    1. Routine general medical examination at a health care facility Z00.00    2. Essential hypertension I10 BP well controlled given age.  losartan (COZAAR) 25 MG tablet     Basic metabolic panel   3. Seborrheic dermatitis of scalp L21.9 ketoconazole (NIZORAL) 2 % shampoo - one or two times per week.  Advised patient to stop wearing a wig.   4. DDD (degenerative disc disease), lumbar M51.36 order for DME for wheelchair.   5. Falls frequently R29.6 order for DME       End of Life Planning:  Patient currently has an advanced directive: Yes.  Practitioner is supportive of decision.    COUNSELING:  Reviewed preventive " "health counseling, as reflected in patient instructions        Estimated body mass index is 27.54 kg/(m^2) as calculated from the following:    Height as of this encounter: 4' 9\" (1.448 m).    Weight as of this encounter: 127 lb 4.8 oz (57.743 kg).     reports that she has never smoked. She has never used smokeless tobacco.      Appropriate preventive services were discussed with this patient, including applicable screening as appropriate for cardiovascular disease, diabetes, osteopenia/osteoporosis, and glaucoma.  As appropriate for age/gender, discussed screening for colorectal cancer, prostate cancer, breast cancer, and cervical cancer. Checklist reviewing preventive services available has been given to the patient.    Reviewed patients plan of care and provided an AVS. The Basic Care Plan (routine screening as documented in Health Maintenance) for Angela meets the Care Plan requirement. This Care Plan has been established and reviewed with the Patient and son.    The risks, benefits and treatment options of prescribed medications or other treatments have been discussed with the patient. The patient verbalized their understanding and should call or follow up if no improvement or if they develop further problems.  ALICE [facility]  "

## 2017-02-13 NOTE — TELEPHONE ENCOUNTER
Tab-a-Vit      Last Written Prescription Date:  HIstorical  Last Fill Quantity: 0,   # refills: 0  Last Office Visit with [facility], P or [facility] prescribing provider: 01/03/2017  Future Office visit:       Routing refill request to provider for review/approval because:  Medication is reported/historical    Fidel CARL (R)

## 2017-02-13 NOTE — TELEPHONE ENCOUNTER
Please call - is this a refill request?  If I've never written a script for it before, then is she buying it over-the-counter?  Devi May, CNP

## 2017-02-13 NOTE — TELEPHONE ENCOUNTER
Ernesto, son, called back.  Ernesto and his wife set up the medications.  They believe this has previously been filled as a prescription.  Perhaps there has been a brand change?  Will check with pharmacist.  Doris Chappell RN

## 2017-02-13 NOTE — TELEPHONE ENCOUNTER
Spoke with pharmacist.  This is a generic multi-vitamin.  Routed to provider.  Insurance has been covering for pt.  Last filled 11/10/15 for 90 plus 3 refills but was dc'd on 7/8/16.  Doris Chappell RN

## 2017-03-22 NOTE — TELEPHONE ENCOUNTER
Reason for call:  Patient reporting a symptom    Symptom or request: hallucinations/water on the knee    Duration (how long have symptoms been present): hallucinations for about a week and water on the knee has been ongoing.    Have you been treated for this before? No    Additional comments: pt's son calling stating his mom started having hallucinations for about a week and he is wondering if she should be seen or what they should do with that. He mentioned that she has been having problems with water on her knee and that has been an ongoing issue.    Phone Number patient can be reached at:  Other phone number:  Ernesto can be reached at [phone]    Best Time:  any    Can we leave a detailed message on this number:  YES    Call taken on 3/22/2017 at 10:22 AM by Maribel Hyatt

## 2017-03-22 NOTE — TELEPHONE ENCOUNTER
"S: see note below.   B: called son, \"she has visual hallucinations, has seen a ghost, and a furry animal that came in and sat on the bed next to her. And they do try to help her over at the [facility], and come in and told her they took it away, she still saw it and went downstairs crying. \"   \"she had these before when she was at [place] too. We went so far as to put a camera in, and I slept there and we didn't see or hear anything. 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 \"    A; advised ED, and he doesn't feel that is necessary, \"It isn't an emergency, they watch her and take care of her at the [facility], \"   Advised it could be something like electrolytes, or medications, or infection, \"yes, I know, it could be that brain tumor she had too, and she is 89, so it is ok, we can wait until Friday and see Devi. \"    R: made an appt with Devi, and advised him if anything changes, any numbness anywhere, dizziness or falls, trouble breathing,any new or worsening sx, to bring her to ED sooner, \"will do, thank you. \"  STANTON Quinonez RNC        "

## 2017-03-24 NOTE — MR AVS SNAPSHOT
After Visit Summary   3/24/2017    Angela Esposito    MRN: [id number]           Patient Information     Date Of Birth          12/9/1916        Visit Information        Provider Department      3/24/2017 8:40 AM Devi May APRN [facility]        Today's Diagnoses     Primary osteoarthritis of both knees    -  1    Effusion of right knee        Hallucinations          Care Instructions    Make appointment with Dr Patel for your knee.      For acid reflux:  Take omeprazole 20 mg every day.  1. Avoid eating within 3-4 hours of bedtime.    2. Eat frequent small meals per day rather than large meals.    3. Avoid tobacco and alcohol products, avoid tight fitting clothes, elevate head of bed with six inch blocks.  4. Take antacids, like TUMS, for occasional heart burn.    5. Avoid NSAIDS (ibuprofen, naproxen).  6. If overweight, weight loss is recommended. Losing even as little as 10 lbs may decrease symptoms.  7. Avoid high fat meals and other foods that aggravate the problem. Foods that may cause more symptoms are: chocolate, tomato-based foods, alcohol, peppermint, caffeinated products, citrus fruits and drinks, onions and garlic.        Thank you for choosing [facility].  You may be receiving a survey in the mail from [employer] regarding your visit today.  Please take a few minutes to complete and return the survey to let us know how we are doing.      If you have questions or concerns, please contact us via [employer] or you can contact your care team at [phone].    Our Clinic hours are:  Monday 6:40 am  to 7:00 pm  Tuesday -Friday 6:40 am to 5:00 pm    The [place] outpatient lab hours are:  Monday - Friday 6:10 am to 4:45 pm  Saturdays 7:00 am to 11:00 am  Appointments are required, call [phone]    If you have clinical questions after hours or would like to schedule an appointment,  call the clinic at [phone].          Follow-ups after your  visit        Additional Services     ORTHO  REFERRAL       [facility] is referring you to the Orthopedic  Services at [facility].  - Dr Patel  Call son Ernesto for appointment: [phone]        The [facility] Representative will assist you in the coordination of your Orthopedic and Musculoskeletal Care as prescribed by your physician.    The  Representative will call you within 1 business day to help schedule your appointment, or you may contact the [facility] Representative at:    All areas ~ [phone]     Type of Referral : Non Surgical       Timeframe requested: 3 - 5 days    Coverage of these services is subject to the terms and limitations of your health insurance plan.  Please call member services at your health plan with any benefit or coverage questions.      If X-rays, CT or MRI's have been performed, please contact the facility where they were done to arrange for , prior to your scheduled appointment.  Please bring this referral request to your appointment and present it to your specialist.                  Future tests that were ordered for you today     Open Future Orders        Priority Expected Expires Ordered    *UA reflex to Microscopic and Culture ([facility] (except [place] and [place]) Routine 3/28/2017 3/24/2018 3/24/2017            Who to contact     If you have questions or need follow up information about today's clinic visit or your schedule please contact [facility] directly at [phone].  Normal or non-critical lab and imaging results will be communicated to you by MyChart, letter or phone within 4 business days after the clinic has received the results. If you do not hear from us within 7 days, please contact the clinic through MyChart or phone. If you have a critical or abnormal lab result, we will notify you by phone as soon as possible.  Submit refill requests  "through [employer] or call your pharmacy and they will forward the refill request to us. Please allow 3 business days for your refill to be completed.          Additional Information About Your Visit        MyChart Information     [employer] lets you send messages to your doctor, view your test results, renew your prescriptions, schedule appointments and more. To sign up, go to [email or web address] . Click on \"Log in\" on the left side of the screen, which will take you to the Welcome page. Then click on \"Sign up Now\" on the right side of the page.     You will be asked to enter the access code listed below, as well as some personal information. Please follow the directions to create your username and password.     Your access code is: [email or web address]  Expires: 2017  9:43 AM     Your access code will  in 90 days. If you need help or a new code, please call your [place] clinic or [phone].        Care EveryWhere ID     This is your Care EveryWhere ID. This could be used by other organizations to access your [place] medical records  [id number]        Your Vitals Were     Pulse Temperature Height BMI (Body Mass Index)          73 98.4  F (36.9  C) 4' 9\" (1.448 m) 26.62 kg/m2         Blood Pressure from Last 3 Encounters:   17 168/75   17 149/79   16 135/70    Weight from Last 3 Encounters:   17 123 lb (55.8 kg)   17 127 lb 4.8 oz (57.7 kg)   16 124 lb (56.2 kg)              We Performed the Following     Basic metabolic panel     ORTHO [facility] REFERRAL        Primary Care Provider Office Phone # Fax #    Devi Patel ALICE Jernigan [phone]       [facility] [address] [address]        Thank you!     Thank you for choosing [facility]  for your care. Our goal is always to provide you with excellent care. Hearing back from our patients is one way we can continue to improve our services. Please take a few " minutes to complete the written survey that you may receive in the mail after your visit with us. Thank you!             Your Updated Medication List - Protect others around you: Learn how to safely use, store and throw away your medicines at www.disposemymeds.org.          This list is accurate as of: 3/24/17  9:43 AM.  Always use your most recent med list.                   Brand Name Dispense Instructions for use    CALCIUM-VITAMIN D PO      Daily , unknown dose       GLUCOSAMINE CHONDROITIN COMPLX Tabs      Take 1 tablet by mouth 3 times daily.       ketoconazole 2 % shampoo    NIZORAL    120 mL    Apply to the affected area and wash off after 5 minutes.       latanoprost 0.005 % ophthalmic solution    XALATAN         losartan 25 MG tablet    COZAAR    90 tablet    Take 1 tablet (25 mg) by mouth daily       multivitamin, therapeutic with minerals Tabs tablet     90 tablet    Take 1 tablet by mouth daily Tab-A-Burak       omeprazole 20 MG tablet     90 tablet    Take one capsule by mouth once daily 30-60 minutes before a meal.       order for DME     1 Units    Equipment being ordered: Wheelchair       prednisoLONE acetate 1 % ophthalmic susp    PRED FORTE         TYLENOL CAPS 500 MG OR      1 CAPSULE EVERY 4 HOURS AS NEEDED

## 2017-03-24 NOTE — PATIENT INSTRUCTIONS
Make appointment with Dr Patel for your knee.      For acid reflux:  Take omeprazole 20 mg every day.  1. Avoid eating within 3-4 hours of bedtime.    2. Eat frequent small meals per day rather than large meals.    3. Avoid tobacco and alcohol products, avoid tight fitting clothes, elevate head of bed with six inch blocks.  4. Take antacids, like TUMS, for occasional heart burn.    5. Avoid NSAIDS (ibuprofen, naproxen).  6. If overweight, weight loss is recommended. Losing even as little as 10 lbs may decrease symptoms.  7. Avoid high fat meals and other foods that aggravate the problem. Foods that may cause more symptoms are: chocolate, tomato-based foods, alcohol, peppermint, caffeinated products, citrus fruits and drinks, onions and garlic.        Thank you for choosing [facility].  You may be receiving a survey in the mail from [employer] regarding your visit today.  Please take a few minutes to complete and return the survey to let us know how we are doing.      If you have questions or concerns, please contact us via [employer] or you can contact your care team at [phone].    Our Clinic hours are:  Monday 6:40 am  to 7:00 pm  Tuesday -Friday 6:40 am to 5:00 pm    The [place] outpatient lab hours are:  Monday - Friday 6:10 am to 4:45 pm  Saturdays 7:00 am to 11:00 am  Appointments are required, call [phone]    If you have clinical questions after hours or would like to schedule an appointment,  call the clinic at [phone].

## 2017-03-24 NOTE — PROGRESS NOTES
"  SUBJECTIVE:                                                    Angela Esposito is a 89 year old female who presents to clinic today for the following health issues:      Joint Pain     Onset: chronic     Description:   Location: right knee  Character: dull ache  Has had fluid removed twice    Intensity: mild to moderate- comes and goes    Pain with walking    Progression of Symptoms: same    Accompanying Signs & Symptoms:  Other symptoms: none   History:   Previous similar pain: YES      Precipitating factors:   Trauma or overuse: not recently    Alleviating factors:  Improved by: rest/inactivity       Therapies Tried and outcome: has had fluid removed from it; tylenol    Last had an injection 12/2015 - worked well      Patient is here with her son who is  Concerned about hallucinations-  He asked me not to say anything until the provider comes in the room; she gets very upset about it.  Patient sees ghosts and furry animals in her bedroom  Is upsetting to her  Lives in assisted living at the [facility].  History of benign brain tumor  These hallucinations are rare.  No acute illnesses  No new medications.  No fever or chills.        Problem list and histories reviewed & adjusted, as indicated.  Additional history: as documented        Reviewed and updated as needed this visit by clinical staff  Tobacco  Meds       Reviewed and updated as needed this visit by Provider         ROS:  Constitutional, HEENT, cardiovascular, pulmonary, gi and gu systems are negative, except as otherwise noted.    OBJECTIVE:                                                    /70  Pulse 73  Temp 98.4  F (36.9  C)  Ht 4' 9\" (1.448 m)  Wt 123 lb (55.8 kg)  BMI 26.62 kg/m2  Body mass index is 26.62 kg/(m^2).  GENERAL: healthy, alert and no distress  RESP: lungs clear to auscultation - no rales, rhonchi or wheezes  CV: regular rate and rhythm, normal S1 S2, no S3 or S4, no murmur, click or rub, no peripheral edema and " peripheral pulses strong  MS: right knee - minimally edematous. Nontender to touch.         ASSESSMENT/PLAN:                                                        ICD-10-CM    1. Primary osteoarthritis of both knees M17.0 ORTHO  REFERRAL   2. Effusion of right knee M25.461 ORTHO  REFERRAL   3. Hallucinations R44.3 Basic metabolic panel     *UA reflex to Microscopic and Culture ([facility], [place] and [facility] (except [place] and [place])       Patient Instructions   Make appointment with Dr Patel for your knee.      For acid reflux:  Take omeprazole 20 mg every day.  1. Avoid eating within 3-4 hours of bedtime.    2. Eat frequent small meals per day rather than large meals.    3. Avoid tobacco and alcohol products, avoid tight fitting clothes, elevate head of bed with six inch blocks.  4. Take antacids, like TUMS, for occasional heart burn.    5. Avoid NSAIDS (ibuprofen, naproxen).  6. If overweight, weight loss is recommended. Losing even as little as 10 lbs may decrease symptoms.  7. Avoid high fat meals and other foods that aggravate the problem. Foods that may cause more symptoms are: chocolate, tomato-based foods, alcohol, peppermint, caffeinated products, citrus fruits and drinks, onions and garlic.          The risks, benefits and treatment options of prescribed medications or other treatments have been discussed with the patient. The patient verbalized their understanding and should call or follow up if no improvement or if they develop further problems.    ALICE Cornejo [facility]

## 2017-04-06 NOTE — NURSING NOTE
"Chief Complaint   Patient presents with     Knee Pain     f/u chronic bilateral knee pain       Initial /70  Ht 4' 9\" (1.448 m)  Wt 123 lb (55.8 kg)  BMI 26.62 kg/m2 Estimated body mass index is 26.62 kg/(m^2) as calculated from the following:    Height as of this encounter: 4' 9\" (1.448 m).    Weight as of this encounter: 123 lb (55.8 kg).  Medication Reconciliation: complete     Constantin Roe ATC  "

## 2017-04-06 NOTE — MR AVS SNAPSHOT
"              After Visit Summary   4/6/2017    Angela Esposito    MRN: [id number]           Patient Information     Date Of Birth          12/9/1916        Visit Information        Provider Department      4/6/2017 2:00 PM EUSEBIO TURPIN/SEVEN MONTEMAYOR [facility]        Today's Diagnoses     Hallucinations    -  1       Follow-ups after your visit        Your next 10 appointments already scheduled     Apr 06, 2017  2:20 PM CDT   New Visit with Kody Patel,    [facility] ([facility])    [address]  [address]  [place] [facility] [place] [address] [phone]              Who to contact     If you have questions or need follow up information about today's clinic visit or your schedule please contact [facility] directly at [phone].  Normal or non-critical lab and imaging results will be communicated to you by [employer], letter or phone within 4 business days after the clinic has received the results. If you do not hear from us within 7 days, please contact the clinic through MyChart or phone. If you have a critical or abnormal lab result, we will notify you by phone as soon as possible.  Submit refill requests through [employer] or call your pharmacy and they will forward the refill request to us. Please allow 3 business days for your refill to be completed.          Additional Information About Your Visit        MyChart Information     [employer] lets you send messages to your doctor, view your test results, renew your prescriptions, schedule appointments and more. To sign up, go to [email or web address] . Click on \"Log in\" on the left side of the screen, which will take you to the Welcome page. Then click on \"Sign up Now\" on the right side of the page.     You will be asked to enter the access code listed below, as well as some personal information. Please follow the directions to create your username and password.     Your access code is: " [email or web address]  Expires: 2017  9:43 AM     Your access code will  in 90 days. If you need help or a new code, please call your [place] clinic or [phone].        Care EveryWhere ID     This is your Care EveryWhere ID. This could be used by other organizations to access your [place] medical records  [id number]         Blood Pressure from Last 3 Encounters:   17 148/70   17 149/79   16 135/70    Weight from Last 3 Encounters:   17 123 lb (55.8 kg)   17 127 lb 4.8 oz (57.7 kg)   16 124 lb (56.2 kg)              Today, you had the following     No orders found for display       Primary Care Provider Office Phone # Fax #    Devi ALICE [facility] [phone]       [facility] [address] [address]        Thank you!     Thank you for choosing [facility]  for your care. Our goal is always to provide you with excellent care. Hearing back from our patients is one way we can continue to improve our services. Please take a few minutes to complete the written survey that you may receive in the mail after your visit with us. Thank you!             Your Updated Medication List - Protect others around you: Learn how to safely use, store and throw away your medicines at www.disposemymeds.org.          This list is accurate as of: 17  2:01 PM.  Always use your most recent med list.                   Brand Name Dispense Instructions for use    CALCIUM-VITAMIN D PO      Daily , unknown dose       GLUCOSAMINE CHONDROITIN COMPLX Tabs      Take 1 tablet by mouth 3 times daily.       ketoconazole 2 % shampoo    NIZORAL    120 mL    Apply to the affected area and wash off after 5 minutes.       latanoprost 0.005 % ophthalmic solution    XALATAN         losartan 25 MG tablet    COZAAR    90 tablet    Take 1 tablet (25 mg) by mouth daily       multivitamin, therapeutic with minerals Tabs tablet     90 tablet    Take 1  tablet by mouth daily Tab-A-Burak       omeprazole 20 MG tablet     90 tablet    Take one capsule by mouth once daily 30-60 minutes before a meal.       order for DME     1 Units    Equipment being ordered: Wheelchair       prednisoLONE acetate 1 % ophthalmic susp    PRED FORTE         TYLENOL CAPS 500 MG OR      1 CAPSULE EVERY 4 HOURS AS NEEDED

## 2017-04-06 NOTE — NURSING NOTE
Son Rowdy here today for lab results and recommendations.  Son was notified that 3/28/17 labs results and recommendations.  Advised that urine has not been collected yet.  Son reports that he will remind [facility] that urine still needs to be collected.    Samantha Santiago RN

## 2017-04-06 NOTE — MR AVS SNAPSHOT
"              After Visit Summary   4/6/2017    Angela Esposito    MRN: [id number]           Patient Information     Date Of Birth          12/9/1916        Visit Information        Provider Department      4/6/2017 2:20 PM Kody Patel DO [facility]        Today's Diagnoses     Effusion of lower leg joint    -  1    Primary osteoarthritis of both knees           Follow-ups after your visit        Your next 10 appointments already scheduled     Apr 12, 2017  1:40 PM CDT   New [facility]emin Mathis MD   [facility]    [address] [place] [id number] [phone]              Who to contact     If you have questions or need follow up information about today's clinic visit or your schedule please contact [facility] directly at [phone].  Normal or non-critical lab and imaging results will be communicated to you by MyChart, letter or phone within 4 business days after the clinic has received the results. If you do not hear from us within 7 days, please contact the clinic through MyChart or phone. If you have a critical or abnormal lab result, we will notify you by phone as soon as possible.  Submit refill requests through [employer] or call your pharmacy and they will forward the refill request to us. Please allow 3 business days for your refill to be completed.          Additional Information About Your Visit        MyChart Information     [employer] lets you send messages to your doctor, view your test results, renew your prescriptions, schedule appointments and more. To sign up, go to [email or web address] . Click on \"Log in\" on the left side of the screen, which will take you to the Welcome page. Then click on \"Sign up Now\" on the right side of the page.     You will be asked to enter the access code listed below, as well as some personal information. Please follow the " "directions to create your username and password.     Your access code is: [email or web address]  Expires: 2017  9:43 AM     Your access code will  in 90 days. If you need help or a new code, please call your [place] clinic or [phone].        Care EveryWhere ID     This is your Care EveryWhere ID. This could be used by other organizations to access your [place] medical records  [id number]        Your Vitals Were     Height BMI (Body Mass Index)                4' 9\" (1.448 m) 26.62 kg/m2           Blood Pressure from Last 3 Encounters:   17 145/70   17 148/70   17 149/79    Weight from Last 3 Encounters:   17 123 lb (55.8 kg)   17 123 lb (55.8 kg)   17 127 lb 4.8 oz (57.7 kg)              We Performed the Following     HC ARTHROCENTESIS ASPIR&/INJ MAJOR JT/BURSA W/US     TRIAMCINOLONE ACET INJ NOS          Today's Medication Changes          These changes are accurate as of: 17 11:59 PM.  If you have any questions, ask your nurse or doctor.               Start taking these medicines.        Dose/Directions    triamcinolone acetonide 40 MG/ML injection   Commonly known as:  KENALOG-40   Used for:  Effusion of lower leg joint, Primary osteoarthritis of both knees   Started by:  Kody Patel DO        Dose:  80 mg   2 mLs (80 mg) by INTRA-ARTICULAR route once for 1 dose   Quantity:  2 mL   Refills:  0            Where to get your medicines      Some of these will need a paper prescription and others can be bought over the counter.  Ask your nurse if you have questions.     You don't need a prescription for these medications     triamcinolone acetonide 40 MG/ML injection                Primary Care Provider Office Phone # Fax #    DeviALICE Vazquez [phone]       [facility] [address] [address]        Thank you!     Thank you for choosing [facility]  for your " care. Our goal is always to provide you with excellent care. Hearing back from our patients is one way we can continue to improve our services. Please take a few minutes to complete the written survey that you may receive in the mail after your visit with us. Thank you!             Your Updated Medication List - Protect others around you: Learn how to safely use, store and throw away your medicines at www.disposemymeds.org.          This list is accurate as of: 4/6/17 11:59 PM.  Always use your most recent med list.                   Brand Name Dispense Instructions for use    CALCIUM-VITAMIN D PO      Daily , unknown dose       GLUCOSAMINE CHONDROITIN COMPLX Tabs      Take 1 tablet by mouth 3 times daily.       ketoconazole 2 % shampoo    NIZORAL    120 mL    Apply to the affected area and wash off after 5 minutes.       latanoprost 0.005 % ophthalmic solution    XALATAN         losartan 25 MG tablet    COZAAR    90 tablet    Take 1 tablet (25 mg) by mouth daily       multivitamin, therapeutic with minerals Tabs tablet     90 tablet    Take 1 tablet by mouth daily Tab-A-Burak       omeprazole 20 MG tablet     90 tablet    Take one capsule by mouth once daily 30-60 minutes before a meal.       order for DME     1 Units    Equipment being ordered: Wheelchair       prednisoLONE acetate 1 % ophthalmic susp    PRED FORTE         triamcinolone acetonide 40 MG/ML injection    KENALOG-40    2 mL    2 mLs (80 mg) by INTRA-ARTICULAR route once for 1 dose       TYLENOL CAPS 500 MG OR      1 CAPSULE EVERY 4 HOURS AS NEEDED

## 2017-04-06 NOTE — PROGRESS NOTES
Angela Esposito  :  1916  DOS: 2017  MRN: [id number]    Sports Medicine Clinic Visit    PCP: Devi May    Angela Esposito is a 89 year old female who is seen  in consultation at the request of  Devi May C.N.P. presenting with right knee pain.    Injury: Angela reports insidious onset of right knee pain over the past 3 weeks without any injury.  Doesn't know if there been any swelling and denies mechanical symptoms.  Is not very steady walking at baseline, however, has had increased pain in the right knee with weight bearing recently.  No fevers, chills, rashes or other illnesses.  Has had similar pain in the past that was treated by a chiropractor.    Location of Pain: right knee  Duration of Pain: 3 week(s)  Rating of Pain at worst: 6/10  Rating of Pain Currently: 0/10  Symptoms are better with: NWB, tylenol  Symptoms are worse with: walking  Additional Features:   Positive: swelling   Negative: bruising, popping, grinding, catching, locking, instability, paresthesias, numbness, weakness, pain in other joints and systemic symptoms  Other evaluation and/or treatments so far consists of: saw PCP, had x-rays  Prior History of related problems: none    Social History: retired    Interim History - December 10, 2015  Since last visit on 2015 patient has worsening right knee pain over last 3 months.  Also having development of left deep anterior medial knee pain over last 3 weeks.  Pain is worse with walking and going from a sit to stand position.  Came into clinic today in wheelchair d/t knee pain.  US guided right knee aspiration and steroid injection completed on 8/26/15 provided moderate relief for 3 - 4 weeks.  No new injury in the interim.    Interim History - 2017  Since last visit on 12/10/15 has moderate bilateral knee pain, right>>>left.  Bilateral knee steroid injection completed on 12/10 provided good relief for ~ 1 year on left, ~ 4 - 5  months on right.  No new injury in the interim.    Review of Systems  Skin: no bruising, mild swelling  Musculoskeletal: as above  Neurologic: no numbness, paresthesias  Remainder of review of systems is negative including constitutional, CV, pulmonary, GI, except as noted in HPI or medical history.    Patient Active Problem List   Diagnosis     Disorder of bone and cartilage     Diverticulosis of large intestine     Essential hypertension     Polyneuropathy in other diseases classified elsewhere (H)     Personal history of benign neoplasm of brain     Generalized osteoarthrosis, unspecified site     Personal history of fall     Fear     DDD (degenerative disc disease), lumbar     Simple dental caries     Benign tumor of brain (H)     Sensorineural hearing loss, bilateral     Vertigo     CARDIOVASCULAR SCREENING; LDL GOAL LESS THAN 130     Lipid disorder     Macular degeneration     Retinal artery occlusion     Vision loss     Advanced directives, counseling/discussion     GERD (gastroesophageal reflux disease)     Pain in shoulder     At risk for falling     Glaucoma     Past Medical History:   Diagnosis Date     Benign neoplasm of colon      Benign tumor of brain (H)      Disorder of bone and cartilage, unspecified      Displacement of lumbar intervertebral disc without myelopathy      Diverticulosis of colon (without mention of hemorrhage)      Esophageal reflux      Irritable bowel syndrome      Spinal stenosis, unspecified region other than cervical      Unspecified essential hypertension      Unspecified glaucoma      Unspecified hereditary and idiopathic peripheral neuropathy      Past Surgical History:   Procedure Laterality Date     C VAGINAL HYSTERECTOMY      Hysterectomy, Vaginal     CATARACT IOL, RT/LT      Cataract IOL RT     CATARACT IOL, RT/LT  7/98    Cataract IOL LT     CL AFF SURGICAL PATHOLOGY      leiomyoma  One ovary remains     COLONOSCOPY  2002    neg     SURGICAL HISTORY OF -   9/04     "Radiation Therapy Brain Tumor     Objective  /70  Ht 4' 9\" (1.448 m)  Wt 123 lb (55.8 kg)  BMI 26.62 kg/m2    GENERAL APPEARANCE: healthy, alert and no distress   GAIT: wheelchair  SKIN: no suspicious lesions or rashes  NEURO: Normal strength and tone and mentation intact  PSYCH:  mentation appears normal and affect normal/bright    Bilateral Knee exam  Inspection:      Moderate effusion without erythema or bruising on R, small effusion on L    Patella:      Normal patellar tracking noted through range of motion, crepitus b/l    Tender:      Mild over medial joint line b/l    Non Tender:      remainder of knee area right    Knee ROM:      Full active and passive ROM with flexion and extension    Hip ROM:     Full active and passive ROM right, deconditioned    Strength:       5/5 with knee extension bilateral - but with pain at Terminal extenstion    Special Tests:     neg (-) Lars right       neg (-) anterior drawer right       neg (-) posterior drawer right       neg (-) varus right       neg (-) valgus right    Neurovascular:      2+ peripheral pulses bilaterally and brisk capillary refill       sensation grossly intact    Sports Medicine Clinic Procedure  Ultrasound Guided Bilateral Intra-Articular Knee Aspiration & Injection    Technique: The risks of the procedure were explained to the patient.  A consent was signed for the intra-articular knee procedure.  The patient was evaluated with a [employer] ultrasound machine using a 12 MHz linear probe.   The Bilateral knee was prepped and draped in a sterile manner.  Ultrasound identification of the patella, suprapatellar pouch, quadriceps tendon and femur in both long and short axis. The probe was placed in short axis to the Bilateral femur.  A 1.5 inch 22 gauge needle was placed under ultrasound guidance into the superior knee joint.  15 ml of straw colored fluid was aspirated from right, 4 ml from left.   A mixture of 2 ml's 1% lidocaine, 2ml's 0.5% " marcaine, and 1 ml kenalog (40mg/ml) was injected without difficulty. The needle was removed and there was good hemostasis without complications.  There was ultrasound documentation of needle placement and injection.  Pre-procedural pain 6/10.  Post procedural pain 2/10.    Radiology  I reviewed these images with the patient  XR KNEE BILATERAL 1/2 VW 7/18/2015 7:57 AM  HISTORY: Pain in joint, lower leg   IMPRESSION  IMPRESSION: Degenerative changes both knee joints with mild joint  space narrowing and hypertrophic change as well as chondrocalcinosis.     KNEE RIGHT ONE TO TWO VIEWS 7/10/2015 1:27 PM   HISTORY: Pain.  COMPARISON: None.  IMPRESSION  IMPRESSION: Chondrocalcinosis of the medial and lateral compartments.  Minimal posterior patellar spurring. Fluid or synovitis in the  suprapatellar bursa. Moderate calcification of the distal superficial  femoral and popliteal artery. Right knee otherwise negative.     Assessment:  1. Effusion of lower leg joint    2. Primary osteoarthritis of both knees        Plan:  Discussed the assessment with the patient.  Angela was clear today that her goal is to have pain control  We were able to drain her knees with US guidance and inject steroid into both knees without issue  We discussed conditioning issues as always, and I encouraged her to perform safe HEP as much as possible to help her to be steadier on her feet, but comfort care is appropriate  Can consider repeat in the future if needed/helpful  End-stage OA, suspect synvisc will not be helpful at this point  Expectations and goals of CSI reviewed  Often 2-3 days for steroid effect, and can take up to two weeks for maximum effect  We discussed modified progressive pain-free activity as tolerated  Do not overuse in first two weeks if feeling better due to concern for vulnerability while steroid is working  Supportive care reviewed  All questions were answered today  Contact us with additional questions or  concerns  Signs and sx of concern reviewed      Kody Patel DO, CAJACQUELYN  Primary Care Sports Medicine  [facility]

## 2017-04-12 NOTE — PROGRESS NOTES
"                                                INITIAL NEUROLOGY CONSULTATION    LOCATION: [facility]   DATE OF VISIT: 2017  MRN: [id number]  NAME: Ms. Angela Esposito  :  1916 (89 year old)    PRIMARY/REFERRING PROVIDER: ALICE Cornejo CNP    REASON FOR CONSULTATION: Visual hallucination    HISTORY OF PRESENT ILLNESS ([place]):    Ms. Esposito is seen at the request of ALICE Cornejo CNP. Accompanied by son. Most of the details of the history of provided by her son  89 year old female with following symptoms:   She lives in assisted living for last 5 years and before that she was living in an independent apartment accommodation in [place].  She has been having visual hallucinations for the last 2-3 weeks. Example she gives me is that she has been seen goat. This particular incident lasted only 5 minutes. There have been few Instances in which she felt as some animal has climbed on her bed and sitting near her feet. 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 She sees minimally with Right eye. Sees Retina specialist and her regular ophthalmologist Dr. Mckeon for glaucoma.   Additional history is that she hears\"\" door knock\" in the middle of the night for since , up to this day. Her son relates that he has slept by the side of the door and hasn't heard door knocks or anybody knocking at her door in the middle of the might. She use hearing aid for both ears for many years.   Her Short-Term Memory Is not good. Good long-term memory. Her son is not keen for further evaluation of her memory issues.     Past Medical History:   Diagnosis Date     Benign neoplasm of colon      Benign tumor of brain (H)      Disorder of bone and cartilage, unspecified      Displacement of lumbar intervertebral disc without myelopathy      Diverticulosis of colon (without mention of hemorrhage)      Esophageal reflux      Irritable bowel syndrome  " "    Spinal stenosis, unspecified region other than cervical      Unspecified essential hypertension      Unspecified glaucoma      Unspecified hereditary and idiopathic peripheral neuropathy      Past Surgical History:   Procedure Laterality Date     C VAGINAL HYSTERECTOMY      Hysterectomy, Vaginal     CATARACT IOL, RT/LT      Cataract IOL RT     CATARACT IOL, RT/LT  7/98    Cataract IOL LT     CL AFF SURGICAL PATHOLOGY      leiomyoma  One ovary remains     COLONOSCOPY  2002    neg     SURGICAL HISTORY OF -   09/2004    Radiation Therapy Brain Tumor-Slow growing tumor and not malignant as per patient       * CURRENT MEDICATIONS:   Outpatient Prescriptions Marked as Taking for the 4/12/17 encounter (Office Visit) with Yasemin Mathis MD   Medication Sig     multivitamin, therapeutic with minerals (MULTI-VITAMIN) TABS tablet Take 1 tablet by mouth daily Tab-A-Burak     losartan (COZAAR) 25 MG tablet Take 1 tablet (25 mg) by mouth daily     CALCIUM-VITAMIN D PO Daily , unknown dose     ketoconazole (NIZORAL) 2 % shampoo Apply to the affected area and wash off after 5 minutes.     order for DME Equipment being ordered: Wheelchair     latanoprost (XALATAN) 0.005 % ophthalmic solution      omeprazole 20 MG tablet Take one capsule by mouth once daily 30-60 minutes before a meal.     prednisoLONE acetate (PRED FORTE) 1 % ophthalmic suspension      Glucosamine-Chondroit-Vit C-Mn (GLUCOSAMINE CHONDROITIN COMPLX) TABS Take 1 tablet by mouth 3 times daily.     TYLENOL CAPS 500 MG OR 1 CAPSULE EVERY 4 HOURS AS NEEDED       *Personal and Social History:  Reviewed and documented in EPIC    Review of Systems:   All negative except as noted in the [place] and Identifying information.    GENERAL EXAMINATION:    General appearance: Pleasant female sitting comfortably in a chair  Pulse 86  Ht 1.448 m (4' 9\")  Wt 54.9 kg (121 lb)  BMI 26.18 kg/m2  Head & Neck:  Neck supple  No carotid bruit    NEUROLOGICAL EXAMINATION:      General " appearance: Pleasant woman sitting comfortably in a chair    Head & Neck:  Neck supple  No carotid bruit    Neurological  Examination:  Mental Status:    Alert and oriented to time, place and person    Follows commands    Speech: Normal    Cranial Nerves:  Cranial Nerve 2:    No Visual acuity to finger counting    Pupils equal and reacting to light        Field defect by confrontation not tested    Fundus reveals pale discon the right. Left disc could not be reliably seen.       Cranial Nerves 3, 4 and 6:    Eye movements normal in all directions of gaze    Cranial Nerve 5:     Normal facial sensory and motor functions    Cranial Nerve 7:     Symmetrical face without motor weakness     Cranial Nerve 8:    Haring id in situ    Cranial Nerves 9, 10:    Normal palate and uvula movements    Cranial Nerve 11:    Shoulder shrug symmetrical    Cranial Nerve 12:    Tongue midline with normal movements    Motor:    Tone and bulk: Normal in both upper and lower limbs    Power: No drift of the outstretched arms          Normal strength in all muscle groups of both upper and lower limbs     Coordination:    Finger nose test and rapid alternate movements normal bilaterally    Heel-shin test normal bilaterally    Deep Tendon Reflexes:    Upper limbs: Equal and symmetrical    Lower limbs: Equal and symmetrical with absent ankle jerks and down going plantars      Sensations:    Touch/Pin prick: Normal at both and upper and lower limbs    Vibration (128 Hz): Absent at both ankles    Position sense: Normal at both big toes    Gait: Not tested. Sitting in a wheel chair.    Romberg Sign: Not done    IMPRESSION:  Encounter Diagnoses   Name Primary?     Visual hallucinations Yes     Auditory hallucinations      COMMENTS: The visual hallucinations are most likely part of her chronic eye disease like macular degeneration. I do not have specific explanation for her long-standing auditory hallucinations. I do not get the impression that she  has psychiatric disease resulting giving her auditory hallucination. I do not think symptomatic medications will help her but instead she is likely to confused with introduction of medications. Her son and agrees with the plan and recommendations.    PLANS:  Patient Instructions   AFTER VISIT SUMMARY (AVS)    No specific treatment for visual hallucinations as it will confuse her    Reluctant for further memory evaluation    Diagnostic possibilities reviewed    Preventive Neurology: Encouraged to keep physically and mentally active with particular emphasis on daily stretching exercises, and healthy eating.    To call us for follow-up appointment on as needed basis      Thanks to ALICE Cornejo CNP for allowing me to participate in Ms. Esposito's care.  Please feel free to call me with any questions or concerns.     Time with patient 45 minutes, greater than 50% of which was counseling and coordination of care.      Yasemin Mathis MD, [facility]  Neurologist    cc: ALICE Cornejo CNP

## 2017-04-12 NOTE — MR AVS SNAPSHOT
"              After Visit Summary   4/12/2017    Angela Esposito    MRN: [id number]           Patient Information     Date Of Birth          12/9/1916        Visit Information        Provider Department      4/12/2017 1:40 PM Yasemin Mathis MD [facility]        Today's Diagnoses     Visual hallucinations    -  1    Auditory hallucinations          Care Instructions    AFTER VISIT SUMMARY (AVS)    No specific treatment for visual hallucinations as it will confuse her    Reluctant for further memory evaluation    Diagnostic possibilities reviewed    Preventive Neurology: Encouraged to keep physically and mentally active with particular emphasis on daily stretching exercises, and healthy eating.    To call us for follow-up appointment on as needed basis    Thanks             Follow-ups after your visit        Who to contact     If you have questions or need follow up information about today's clinic visit or your schedule please contact [facility] directly at [phone].  Normal or non-critical lab and imaging results will be communicated to you by MyChart, letter or phone within 4 business days after the clinic has received the results. If you do not hear from us within 7 days, please contact the clinic through MyChart or phone. If you have a critical or abnormal lab result, we will notify you by phone as soon as possible.  Submit refill requests through [employer] or call your pharmacy and they will forward the refill request to us. Please allow 3 business days for your refill to be completed.          Additional Information About Your Visit        MyChart Information     [employer] lets you send messages to your doctor, view your test results, renew your prescriptions, schedule appointments and more. To sign up, go to [email or web address] . Click on \"Log in\" on the left side of the screen, which will take you to the Welcome page. Then click on \"Sign up Now\" on the right side of the " "page.     You will be asked to enter the access code listed below, as well as some personal information. Please follow the directions to create your username and password.     Your access code is: [email or web address]  Expires: 2017  9:43 AM     Your access code will  in 90 days. If you need help or a new code, please call your [place] clinic or [phone].        Care EveryWhere ID     This is your Care EveryWhere ID. This could be used by other organizations to access your [place] medical records  [id number]        Your Vitals Were     Pulse Height BMI (Body Mass Index)             86 1.448 m (4' 9\") 26.18 kg/m2          Blood Pressure from Last 3 Encounters:   17 110/63   17 145/70   17 148/70    Weight from Last 3 Encounters:   17 54.9 kg (121 lb)   17 55.8 kg (123 lb)   17 55.8 kg (123 lb)              Today, you had the following     No orders found for display       Primary Care Provider Office Phone # Fax #    Devi ALICE [facility] [phone]       [facility] [address] [place] [address]        Thank you!     Thank you for choosing [facility]  for your care. Our goal is always to provide you with excellent care. Hearing back from our patients is one way we can continue to improve our services. Please take a few minutes to complete the written survey that you may receive in the mail after your visit with us. Thank you!             Your Updated Medication List - Protect others around you: Learn how to safely use, store and throw away your medicines at www.disposemymeds.org.          This list is accurate as of: 17  2:47 PM.  Always use your most recent med list.                   Brand Name Dispense Instructions for use    CALCIUM-VITAMIN D PO      Daily , unknown dose       GLUCOSAMINE CHONDROITIN COMPLX Tabs      Take 1 tablet by mouth 3 times daily.       ketoconazole 2 % shampoo    NIZORAL    " 120 mL    Apply to the affected area and wash off after 5 minutes.       latanoprost 0.005 % ophthalmic solution    XALATAN         losartan 25 MG tablet    COZAAR    90 tablet    Take 1 tablet (25 mg) by mouth daily       multivitamin, therapeutic with minerals Tabs tablet     90 tablet    Take 1 tablet by mouth daily Tab-A-Burak       omeprazole 20 MG tablet     90 tablet    Take one capsule by mouth once daily 30-60 minutes before a meal.       order for DME     1 Units    Equipment being ordered: Wheelchair       prednisoLONE acetate 1 % ophthalmic susp    PRED FORTE         TYLENOL CAPS 500 MG OR      1 CAPSULE EVERY 4 HOURS AS NEEDED

## 2017-04-12 NOTE — NURSING NOTE
"Chief Complaint   Patient presents with     Neurologic Problem     Hallucination at night       Initial /63 (BP Location: Right arm, Patient Position: Chair, Cuff Size: Adult Regular)  Pulse 86  Ht 4' 9\" (1.448 m)  Wt 121 lb (54.9 kg)  BMI 26.18 kg/m2 Estimated body mass index is 26.18 kg/(m^2) as calculated from the following:    Height as of this encounter: 4' 9\" (1.448 m).    Weight as of this encounter: 121 lb (54.9 kg).  Medication Reconciliation: complete   Alison Aaron MA      "

## 2017-04-12 NOTE — PATIENT INSTRUCTIONS
AFTER VISIT SUMMARY (AVS)    No specific treatment for visual hallucinations as it will confuse her    Reluctant for further memory evaluation    Diagnostic possibilities reviewed    Preventive Neurology: Encouraged to keep physically and mentally active with particular emphasis on daily stretching exercises, and healthy eating.    To call us for follow-up appointment on as needed basis    Thanks

## 2017-05-15 NOTE — MR AVS SNAPSHOT
"              After Visit Summary   5/15/2017    nAgela Esposito    MRN: [id number]           Patient Information     Date Of Birth          1916        Visit Information        Provider Department      5/15/2017 11:30 AM Lola Paula AuD [facility]        Today's Diagnoses     Sensorineural hearing loss, bilateral    -  1       Follow-ups after your visit        Who to contact     If you have questions or need follow up information about today's clinic visit or your schedule please contact [facility] directly at [phone].  Normal or non-critical lab and imaging results will be communicated to you by [employer], letter or phone within 4 business days after the clinic has received the results. If you do not hear from us within 7 days, please contact the clinic through [employer] or phone. If you have a critical or abnormal lab result, we will notify you by phone as soon as possible.  Submit refill requests through [employer] or call your pharmacy and they will forward the refill request to us. Please allow 3 business days for your refill to be completed.          Additional Information About Your Visit        MyChart Information     [employer] lets you send messages to your doctor, view your test results, renew your prescriptions, schedule appointments and more. To sign up, go to [email or web address] . Click on \"Log in\" on the left side of the screen, which will take you to the Welcome page. Then click on \"Sign up Now\" on the right side of the page.     You will be asked to enter the access code listed below, as well as some personal information. Please follow the directions to create your username and password.     Your access code is: [email or web address]  Expires: 2017  9:43 AM     Your access code will  in 90 days. If you need help or a new code, please call your [facility] or [phone].        Care EveryWhere ID     This is your Care EveryWhere ID. This could be used " by other organizations to access your [place] medical records  [id number]         Blood Pressure from Last 3 Encounters:   04/12/17 110/63   04/06/17 145/70   03/24/17 148/70    Weight from Last 3 Encounters:   04/12/17 121 lb (54.9 kg)   04/06/17 123 lb (55.8 kg)   03/24/17 123 lb (55.8 kg)              We Performed the Following     HEARING AID CHECK/NO CHARGE        Primary Care Provider Office Phone # Fax #    Devi ALICE Thomas [phone]       [facility] [address] [facility] [address]        Thank you!     Thank you for choosing [facility]  for your care. Our goal is always to provide you with excellent care. Hearing back from our patients is one way we can continue to improve our services. Please take a few minutes to complete the written survey that you may receive in the mail after your visit with us. Thank you!             Your Updated Medication List - Protect others around you: Learn how to safely use, store and throw away your medicines at www.disposemymeds.org.          This list is accurate as of: 5/15/17  4:38 PM.  Always use your most recent med list.                   Brand Name Dispense Instructions for use    CALCIUM-VITAMIN D PO      Daily , unknown dose       GLUCOSAMINE CHONDROITIN COMPLX Tabs      Take 1 tablet by mouth 3 times daily.       ketoconazole 2 % shampoo    NIZORAL    120 mL    Apply to the affected area and wash off after 5 minutes.       latanoprost 0.005 % ophthalmic solution    XALATAN         losartan 25 MG tablet    COZAAR    90 tablet    Take 1 tablet (25 mg) by mouth daily       multivitamin, therapeutic with minerals Tabs tablet     90 tablet    Take 1 tablet by mouth daily Tab-A-Burak       omeprazole 20 MG tablet     90 tablet    Take one capsule by mouth once daily 30-60 minutes before a meal.       order for DME     1 Units    Equipment being ordered: Wheelchair       prednisoLONE acetate 1 %  ophthalmic susp    PRED FORTE         TYLENOL CAPS 500 MG OR      1 CAPSULE EVERY 4 HOURS AS NEEDED

## 2017-05-15 NOTE — PROGRESS NOTES
89 year old female requests repair of her right ear 2014 Phonak Virto Q50-HS hearing aid.  She reports that she and her son are unable to get the hearing aid to work.    Cleaned dustin ports and  tubing and hearing aid was still non-functioning.     Sent hearing aid in for 12 months warranty repair. See chart in the hearing aid room.     Patient's son will be contacted when the hearing aid is ready to be picked up.    Lola SOLOMON, #[id number]

## 2017-05-22 NOTE — MR AVS SNAPSHOT
"              After Visit Summary   5/22/2017    Angela Esposito    MRN: [id number]           Patient Information     Date Of Birth          12/9/1916        Visit Information        Provider Department      5/22/2017 3:30 PM Lola Paula AuD [facility]        Today's Diagnoses     Sensorineural hearing loss, bilateral    -  1       Follow-ups after your visit        Your next 10 appointments already scheduled     May 31, 2017 12:45 PM CDT   New Visit with Daniella Hermosillo MD   [facility]    [address] [facility] [address]   [phone]              Who to contact     If you have questions or need follow up information about today's clinic visit or your schedule please contact [facility] directly at [phone].  Normal or non-critical lab and imaging results will be communicated to you by MyChart, letter or phone within 4 business days after the clinic has received the results. If you do not hear from us within 7 days, please contact the clinic through MyChart or phone. If you have a critical or abnormal lab result, we will notify you by phone as soon as possible.  Submit refill requests through [employer] or call your pharmacy and they will forward the refill request to us. Please allow 3 business days for your refill to be completed.          Additional Information About Your Visit        MyChart Information     [employer] lets you send messages to your doctor, view your test results, renew your prescriptions, schedule appointments and more. To sign up, go to [email or web address] . Click on \"Log in\" on the left side of the screen, which will take you to the Welcome page. Then click on \"Sign up Now\" on the right side of the page.     You will be asked to enter the access code listed below, as well as some personal information. Please follow the directions to create your username and password.     Your access code is: " [email or web address]  Expires: 2017  9:43 AM     Your access code will  in 90 days. If you need help or a new code, please call your [place] clinic or [phone].        Care EveryWhere ID     This is your Care EveryWhere ID. This could be used by other organizations to access your [place] medical records  [id number]         Blood Pressure from Last 3 Encounters:   17 110/63   17 145/70   17 148/70    Weight from Last 3 Encounters:   17 121 lb (54.9 kg)   17 123 lb (55.8 kg)   17 123 lb (55.8 kg)              We Performed the Following     REPAIR/MOD OF HA DIGITAL 12 MO FACTORY        Primary Care Provider Office Phone # Fax #    Devi ALICE [facility] [phone]       [facility] [address] [facility] [address]        Thank you!     Thank you for choosing [facility]  for your care. Our goal is always to provide you with excellent care. Hearing back from our patients is one way we can continue to improve our services. Please take a few minutes to complete the written survey that you may receive in the mail after your visit with us. Thank you!             Your Updated Medication List - Protect others around you: Learn how to safely use, store and throw away your medicines at www.disposemymeds.org.          This list is accurate as of: 17  3:41 PM.  Always use your most recent med list.                   Brand Name Dispense Instructions for use    CALCIUM-VITAMIN D PO      Daily , unknown dose       GLUCOSAMINE CHONDROITIN COMPLX Tabs      Take 1 tablet by mouth 3 times daily.       ketoconazole 2 % shampoo    NIZORAL    120 mL    Apply to the affected area and wash off after 5 minutes.       latanoprost 0.005 % ophthalmic solution    XALATAN         losartan 25 MG tablet    COZAAR    90 tablet    Take 1 tablet (25 mg) by mouth daily       multivitamin, therapeutic with minerals Tabs tablet     90 tablet     Take 1 tablet by mouth daily Tab-A-Burak       omeprazole 20 MG tablet     90 tablet    Take one capsule by mouth once daily 30-60 minutes before a meal.       order for DME     1 Units    Equipment being ordered: Wheelchair       prednisoLONE acetate 1 % ophthalmic susp    PRED FORTE         TYLENOL CAPS 500 MG OR      1 CAPSULE EVERY 4 HOURS AS NEEDED

## 2017-05-22 NOTE — PROGRESS NOTES
89 year old female's son comes in to  her repaired 2014 Phonak Virto Q50-HS hearing aid.     Reviewed repair done and warranty expiration date. Written information given to the family.  Verified hearing aid functionality and hearing aid settings. See chart in the hearing aid room.     Return to clinic as needed.    Lola SOLOMON, #[id number]

## 2017-05-26 NOTE — IP AVS SNAPSHOT
"    [facility] SURGICAL: [phone]                                              INTERAGENCY TRANSFER FORM - PHYSICIAN ORDERS   2017                    Hospital Admission Date: 2017  DOROTA DINH   : 1916  Sex: Female        Attending Provider: (none)    Allergies:  Sulfa Drugs    Infection:  None   Service:  HOSPITALIST    Ht:  1.549 m (5' 1\")   Wt:  57.3 kg (126 lb 5.2 oz)   Admission Wt:  57.3 kg (126 lb 5.2 oz)    BMI:  23.87 kg/m 2   BSA:  1.57 m 2            Patient PCP Information     Provider PCP Type    ALICE Cornejo [facility] General      ED Clinical Impression     Diagnosis Description Comment Added By Time Added    Falls frequently [R29.6] Falls frequently [R29.6]  Fidel Acosta MD 2017 11:35 PM    Unsteady gait [R26.81] Unsteady gait [R26.81]  Fidel Acosta MD 2017 11:35 PM      Hospital Problems as of 2017              Priority Class Noted POA    Essential hypertension   2006 Yes    Benign tumor of brain (H)   3/9/2010 Yes    Macular degeneration   2010 Yes    GERD (gastroesophageal reflux disease)   2012 Yes    Glaucoma   2015 Yes    Unsteady gait Medium  2017 Yes    Fall Medium  2017 Yes    Falls Medium  2017 Yes      Non-Hospital Problems as of 2017              Priority Class Noted    Disorder of bone and cartilage   2006    Diverticulosis of large intestine   2006    Polyneuropathy in other diseases classified elsewhere (H)   2006    Personal history of benign neoplasm of brain   2006    Generalized osteoarthrosis, unspecified site   2007    Personal history of fall   2008    Fear   2009    DDD (degenerative disc disease), lumbar   2009    Simple dental caries   2010    Sensorineural hearing loss, bilateral   2010    Vertigo   2010    CARDIOVASCULAR SCREENING; LDL GOAL LESS THAN 130   10/31/2010    Lipid disorder   12/10/2010 "    Retinal artery occlusion   12/13/2010    Vision loss   12/16/2010    Advanced directives, counseling/discussion   3/12/2012    Pain in shoulder   2/4/2013    At risk for falling   2/4/2013      Code Status History     Date Active Date Inactive Code Status Order ID Comments User Context    6/1/2017 10:46 AM  DNR/DNI [id number]  Darin Bliss MD Outpatient    5/27/2017  1:45 AM 6/1/2017 10:46 AM DNR/DNI [id number]  David Hernadnez MD Inpatient    6/24/2013  5:38 PM 5/27/2017  1:45 AM DNR [id number]  Angelia Nayak NP Outpatient    10/19/2009  3:11 PM 6/24/2013  5:38 PM DNR None  Kat Muñoz Demographics         Medication Review      START taking        Dose / Directions Comments    OLANZapine 2.5 MG tablet   Commonly known as:  zyPREXA   Used for:  Dementia, senile with delusions, 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   Refills:  0        ketoconazole 2 % shampoo   Commonly known as:  NIZORAL   Used for:  Seborrheic dermatitis of scalp        Apply to the affected area and wash off after 5 minutes.   Quantity:  120 mL   Refills:  1        latanoprost 0.005 % ophthalmic solution   Commonly known as:  XALATAN        Dose:  1 drop   Place 1 drop into the right eye At Bedtime   Refills:  0        multivitamin, therapeutic with minerals Tabs tablet   Used for:  Encounter for herb and vitamin supplement management        Dose:  1 tablet   Take 1 tablet by mouth daily Tab-A-Burak   Quantity:  90 tablet   Refills:  3        omeprazole 20 MG tablet   Used for:  GERD (gastroesophageal reflux disease)        Take one capsule by  mouth once daily 30-60 minutes before a meal.   Quantity:  90 tablet   Refills:  1        order for DME   Used for:  Falls frequently, DDD (degenerative disc disease), lumbar        Equipment being ordered: Wheelchair   Quantity:  1 Units   Refills:  0        prednisoLONE acetate 1 % ophthalmic susp   Commonly known as:  PRED FORTE        Dose:  1 drop   Place 1 drop into the right eye every 3 days   Refills:  0        TYLENOL CAPS 500 MG OR        1 CAPSULE by mouth three times daily   Refills:  0        VITAMIN B-12 PO        Dose:  1 tablet   Take 1 tablet by mouth daily   Refills:  0          STOP taking     CALCIUM-VITAMIN D PO           losartan 25 MG tablet   Commonly known as:  COZAAR                     Further instructions from your care team       Hospice consult   Olanzapine 1.25 mg at 8 PM for disturbing delusions       After Care     Activity - Up with nursing assistance           Advance Diet as Tolerated       Follow this diet upon discharge: Orders Placed This Encounter      Regular Diet Adult       General info for SNF       Length of Stay Estimate: Long Term Care  Condition at Discharge: Declining  Level of care:skilled   Rehabilitation Potential: Poor  Admission H&P remains valid and up-to-date: Yes  Recent Chemotherapy: N/A  Use Nursing Home Standing Orders: Yes       Mantoux instructions       Give two-step Mantoux (PPD) Per Facility Policy Yes             Referrals     HOSPICE REFERRAL       **Order classes of: [facility], [facility] and [facility] will route to the Home Care and Hospice Referral Pool.  Home Care or Hospice will then contact the patient to schedule their appointment.**    If you do not hear from Home Care and Hospice, or you would like to call to schedule, please call the referring place of service that your provider has listed below.  ______________________________________________________________________    Your provider has referred you to: [facility]: [facility] [phone]   [email or web address]    Extended Emergency Contact Information  Primary Emergency Contact: Ernesto Esposito   [facility]  Home Phone: [phone]  Mobile Phone: [phone]  Relation: Son  Secondary Emergency Contact: J CARLOS BERRIOS           [facility]  Home Phone: [phone]  Relation: Daughter    Patient Anticipated Discharge Date: 6/1/17   RN, PT, HHA to begin 24 - 48 hours after discharge.  PLEASE EVALUATE AND TREAT (Evaluation timeline is 24 - 48 hrs. Please call if there is need for a variance to this timeline).    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    No current outpatient prescriptions on file.    Patient Active Problem List:     Disorder of bone and cartilage     Diverticulosis of large intestine     Essential hypertension     Polyneuropathy in other diseases classified elsewhere (H)     Personal history of benign neoplasm of brain     Generalized osteoarthrosis, unspecified site     Personal history of fall     Fear     DDD (degenerative disc disease), lumbar     Simple dental caries     Benign tumor of brain (H)     Sensorineural hearing loss, bilateral     Vertigo     CARDIOVASCULAR SCREENING; LDL GOAL LESS THAN 130     Lipid disorder     Macular degeneration     Retinal artery occlusion     Vision loss     Advanced directives, counseling/discussion     GERD (gastroesophageal reflux disease)     Pain in shoulder     At risk for falling     Glaucoma     Unsteady gait     Fall     Falls      Documentation of Face to Face and Certification for Home Health Services    I certify that patient, Angela Esposito is under my care and that I, or a Nurse Practitioner or Physician's Assistant  working with me, had a face-to-face encounter that meets the physician face-to-face encounter requirements with this patient on: 5/31/2017.    This encounter with the patient was in whole, or in part, for the following medical condition, which is the primary reason for Home Health Care: .    I certify that, based on my findings, the following services are medically necessary Home Health Services:     My clinical findings support the need for the above services because: Nurse is needed: hospice.    Further, I certify that my clinical findings support that this patient is homebound (i.e. absences from home require considerable and taxing effort and are for medical reasons or Buddhist services or infrequently or of short duration when for other reasons) because:     Based on the above findings, I certify that this patient is confined to the home and needs intermittent skilled nursing care, physical therapy and/or speech therapy.  The patient is under my care, and I have initiated the establishment of the plan of care.  This patient will be followed by a physician who will periodically review the plan of care.    Physician/Provider to provide follow up care: Devi May    Responsible RAMEZ certified Physician at time of discharge:     Please be aware that coverage of these services is subject to the terms and limitations of your health insurance plan.  Call member services at your health plan with any benefit or coverage questions.             Statement of Approval     Ordered          06/01/17 1048  I have reviewed and agree with all the recommendations and orders detailed in this document.  EFFECTIVE NOW     Approved and electronically signed by:  Darin Bliss MD

## 2017-05-26 NOTE — IP AVS SNAPSHOT
MRN:[id number]                      After Visit Summary   5/26/2017    Angela Esposito    MRN: [id number]           Thank you!     Thank you for choosing [facility] for your care. Our goal is always to provide you with excellent care. Hearing back from our patients is one way we can continue to improve our services. Please take a few minutes to complete the written survey that you may receive in the mail after you visit with us. Thank you!        Patient Information     Date Of Birth          12/9/1916        Designated Caregiver       Most Recent Value    Caregiver    Will someone help with your care after discharge? no [memory care unit]      About your hospital stay     You were admitted on:  May 26, 2017 You last received care in the:  [facility]    You were discharged on:  June 1, 2017       Who to Call     For medical emergencies, please call 911.  For non-urgent questions about your medical care, please call your primary care provider or clinic, [phone]          Attending Provider     Provider Specialty    Fidel Acosta MD Emergency Medicine    David Hernandez MD Internal Medicine    [facility], Donn COONEY MD Family Practice    [facility]       Primary Care Provider Office Phone # Fax #    Devi Patel ALICE [facility] [phone]      After Care Instructions     Activity - Up with nursing assistance           Advance Diet as Tolerated       Follow this diet upon discharge: Orders Placed This Encounter      Regular Diet Adult            General info for SNF       Length of Stay Estimate: Long Term Care  Condition at Discharge: Declining  Level of care:skilled   Rehabilitation Potential: Poor  Admission H&P remains valid and up-to-date: Yes  Recent Chemotherapy: N/A  Use Nursing Home Standing Orders: Yes            Mantoux instructions       Give two-step Mantoux (PPD) Per Facility Policy Yes                   Additional Services     HOSPICE REFERRAL       **Order classes of: [facility], [facility] and [facility] will route to the Home Care and Hospice Referral Pool.  Home Care or Hospice will then contact the patient to schedule their appointment.**    If you do not hear from Home Care and Hospice, or you would like to call to schedule, please call the referring place of service that your provider has listed below.  ______________________________________________________________________    Your provider has referred you to: [facility]: [facility] [phone]   [email or web address]    Extended Emergency Contact Information  Primary Emergency Contact: Ernesto Esposito   [facility]  Home Phone: [phone]  Mobile Phone: [phone]  Relation: Son  Secondary Emergency Contact: J CARLOS BERRIOS [facility]  Home Phone: [phone]  Relation: Daughter    Patient Anticipated Discharge Date: 6/1/17   RN, PT, HHA to begin 24 - 48 hours after discharge.  PLEASE EVALUATE AND TREAT (Evaluation timeline is 24 - 48 hrs. Please call if there is need for a variance to this timeline).    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    No current outpatient prescriptions on file.    Patient Active Problem List:     Disorder of bone and cartilage     Diverticulosis of large intestine     Essential hypertension     Polyneuropathy in other diseases classified elsewhere (H)     Personal history of benign neoplasm of brain     Generalized osteoarthrosis, unspecified site     Personal history of fall     Fear     DDD (degenerative disc disease), lumbar     Simple dental caries     Benign tumor of brain (H)     Sensorineural  hearing loss, bilateral     Vertigo     CARDIOVASCULAR SCREENING; LDL GOAL LESS THAN 130     Lipid disorder     Macular degeneration     Retinal artery occlusion     Vision loss     Advanced directives, counseling/discussion     GERD (gastroesophageal reflux disease)     Pain in shoulder     At risk for falling     Glaucoma     Unsteady gait     Fall     Falls      Documentation of Face to Face and Certification for Home Health Services    I certify that patient, Angela Esposito is under my care and that I, or a Nurse Practitioner or Physician's Assistant working with me, had a face-to-face encounter that meets the physician face-to-face encounter requirements with this patient on: 5/31/2017.    This encounter with the patient was in whole, or in part, for the following medical condition, which is the primary reason for Home Health Care: .    I certify that, based on my findings, the following services are medically necessary Home Health Services:     My clinical findings support the need for the above services because: Nurse is needed: hospice.    Further, I certify that my clinical findings support that this patient is homebound (i.e. absences from home require considerable and taxing effort and are for medical reasons or Pent[employer]ostal services or infrequently or of short duration when for other reasons) because:     Based on the above findings, I certify that this patient is confined to the home and needs intermittent skilled nursing care, physical therapy and/or speech therapy.  The patient is under my care, and I have initiated the establishment of the plan of care.  This patient will be followed by a physician who will periodically review the plan of care.    Physician/Provider to provide follow up care: Devi May certified Physician at time of discharge:     Please be aware that coverage of these services is subject to the terms and limitations of your health insurance  "plan.  Call member services at your health plan with any benefit or coverage questions.                  Further instructions from your care team       Hospice consult   Olanzapine 1.25 mg at 8 PM for disturbing delusions       Pending Results     Date and Time Order Name Status Description    2017 0836 EKG 12-lead, tracing only In process             Statement of Approval     Ordered          17 1048  I have reviewed and agree with all the recommendations and orders detailed in this document.  EFFECTIVE NOW     Approved and electronically signed by:  Darin Bliss MD             Admission Information     Date & Time Provider Department Dept. Phone    2017 Darin Bliss MD [facility] Surgical [phone]      Your Vitals Were     Blood Pressure Pulse Temperature Respirations Height Weight    179/56 (BP Location: Right arm) 59 98  F (36.7  C) (Oral) 18 1.549 m (5' 1\") 57.3 kg (126 lb 5.2 oz)    Pulse Oximetry BMI (Body Mass Index)                90% 23.87 kg/m2          [employer] Information     [employer] lets you send messages to your doctor, view your test results, renew your prescriptions, schedule appointments and more. To sign up, go to [email or web address] . Click on \"Log in\" on the left side of the screen, which will take you to the Welcome page. Then click on \"Sign up Now\" on the right side of the page.     You will be asked to enter the access code listed below, as well as some personal information. Please follow the directions to create your username and password.     Your access code is: [email or web address]  Expires: 2017  9:43 AM     Your access code will  in 90 days. If you need help or a new code, please call your [facility] clinic or [phone].        Care EveryWhere ID     This is your Care EveryWhere ID. This could be used by other organizations to access your [facility] medical records  [id number]           Review of your medicines      START taking        " Dose / Directions    OLANZapine 2.5 MG tablet   Commonly known as:  zyPREXA   Used for:  Dementia, senile with delusions, without behavioral disturbance (H)        Dose:  1.25 mg   Take 0.5 tablets (1.25 mg) by mouth daily   Quantity:  60 tablet   Refills:  0         CONTINUE these medicines which have NOT CHANGED        Dose / Directions    CRANBERRY PO        Dose:  1 capsule   Take 1 capsule by mouth daily   Refills:  0       GLUCOSAMINE CHONDROITIN COMPLX Tabs        Dose:  1 tablet   Take 1 tablet by mouth 3 times daily.   Refills:  0       ketoconazole 2 % shampoo   Commonly known as:  NIZORAL   Used for:  Seborrheic dermatitis of scalp        Apply to the affected area and wash off after 5 minutes.   Quantity:  120 mL   Refills:  1       latanoprost 0.005 % ophthalmic solution   Commonly known as:  XALATAN        Dose:  1 drop   Place 1 drop into the right eye At Bedtime   Refills:  0       multivitamin, therapeutic with minerals Tabs tablet   Used for:  Encounter for herb and vitamin supplement management        Dose:  1 tablet   Take 1 tablet by mouth daily Tab-A-Burak   Quantity:  90 tablet   Refills:  3       omeprazole 20 MG tablet   Used for:  GERD (gastroesophageal reflux disease)        Take one capsule by mouth once daily 30-60 minutes before a meal.   Quantity:  90 tablet   Refills:  1       order for DME   Used for:  Falls frequently, DDD (degenerative disc disease), lumbar        Equipment being ordered: Wheelchair   Quantity:  1 Units   Refills:  0       prednisoLONE acetate 1 % ophthalmic susp   Commonly known as:  PRED FORTE        Dose:  1 drop   Place 1 drop into the right eye every 3 days   Refills:  0       TYLENOL CAPS 500 MG OR        1 CAPSULE by mouth three times daily   Refills:  0       VITAMIN B-12 PO        Dose:  1 tablet   Take 1 tablet by mouth daily   Refills:  0         STOP taking     CALCIUM-VITAMIN D PO           losartan 25 MG tablet   Commonly known as:  COZAAR                 Where to get your medicines      These medications were sent to [facility] [address] [facility]  [address] [facility] [address]     Phone:  [phone]     OLANZapine 2.5 MG tablet                Protect others around you: Learn how to safely use, store and throw away your medicines at www.disposemymeds.org.             Medication List: This is a list of all your medications and when to take them. Check marks below indicate your daily home schedule. Keep this list as a reference.      Medications           Morning Afternoon Evening Bedtime As Needed    CRANBERRY PO   Take 1 capsule by mouth daily                                GLUCOSAMINE CHONDROITIN COMPLX Tabs   Take 1 tablet by mouth 3 times daily.                                ketoconazole 2 % shampoo   Commonly known as:  NIZORAL   Apply to the affected area and wash off after 5 minutes.                                latanoprost 0.005 % ophthalmic solution   Commonly known as:  XALATAN   Place 1 drop into the right eye At Bedtime   Last time this was given:  1 drop on 5/31/2017 10:57 PM                                multivitamin, therapeutic with minerals Tabs tablet   Take 1 tablet by mouth daily Tab-A-Burak                                OLANZapine 2.5 MG tablet   Commonly known as:  zyPREXA   Take 0.5 tablets (1.25 mg) by mouth daily   Last time this was given:  1.25 mg on 5/31/2017  7:15 PM                                omeprazole 20 MG tablet   Take one capsule by mouth once daily 30-60 minutes before a meal.                                order for DME   Equipment being ordered: Wheelchair                                prednisoLONE acetate 1 % ophthalmic susp   Commonly known as:  PRED FORTE   Place 1 drop into the right eye every 3 days   Last time this was given:  1 drop on 5/29/2017  8:29 PM                                TYLENOL CAPS 500 MG OR   1 CAPSULE by mouth three times daily                                 VITAMIN B-12 PO   Take 1 tablet by mouth daily

## 2017-05-26 NOTE — IP AVS SNAPSHOT
"` `     [facility] SURGICAL: [phone]            Medication Administration Report for Angela Esposito as of 06/01/17 1115   Legend:    Given Hold Not Given Due Canceled Entry Other Actions    Time Time (Time) Time  Time-Action       Inactive    Active    Linked        Medications 05/26/17 05/27/17 05/28/17 05/29/17 05/30/17 05/31/17 06/01/17    acetaminophen (TYLENOL) tablet 650 mg  Dose: 650 mg Freq: EVERY 4 HOURS PRN Route: PO  PRN Reason: mild pain  Start: 05/27/17 0145   Admin Instructions: Alternate ibuprofen (if ordered) with acetaminophen.  Maximum acetaminophen dose from all sources = 75 mg/kg/day not to exceed 4 grams/day.      0814 (650 mg)-Given        0840 (650 mg)-Given         0647 (650 mg)-Given       2017 (650 mg)-Given        0854 (650 mg)-Given            enoxaparin (LOVENOX) injection 30 mg  Dose: 30 mg Freq: EVERY 24 HOURS Route: SC  Start: 05/27/17 0915   Admin Instructions: HOLD if platelet count falls below 50% of baseline or less than 100,000/ L and notify provider.      1049 (30 mg)-Given        0843 (30 mg)-Given        0905 (30 mg)-Given        0858 (30 mg)-Given        0854 (30 mg)-Given        0854 (30 mg)-Given           latanoprost (XALATAN) 0.005 % ophthalmic solution 1 drop  Dose: 1 drop Freq: AT BEDTIME Route: RIGHT EYE  Start: 05/29/17 2200   Admin Instructions: Refrigerated product.        2249 (1 drop)-Given        2016 (1 drop)-Given               2257 (1 drop)-Given        [ ] 2200           lidocaine (LMX4) kit  Freq: EVERY 1 HOUR PRN Route: Top  PRN Reason: pain  PRN Comment: with VAD insertion or accessing implanted port.  Start: 05/27/17 0910   Admin Instructions: Do NOT give if patient has a history of allergy to any local anesthetic or any \"juanita\" product.   Apply 30 minutes prior to VAD insertion or port access.  MAX Dose:  2.5 g (  of 5 g tube)               lidocaine 1 % 1 mL  Dose: 1 mL Freq: EVERY 1 HOUR PRN Route: OTHER  PRN Comment: mild pain " "with VAD insertion or accessing implanted port  Start: 05/27/17 0910   Admin Instructions: Do NOT give if patient has a history of allergy to any local anesthetic or any \"juanita\" product. MAX dose 1 mL subcutaneous OR intradermal in divided doses.               losartan (COZAAR) tablet 50 mg  Dose: 50 mg Freq: DAILY Route: PO  Start: 05/29/17 0800       0806 (50 mg)-Given        0858 (50 mg)-Given        0854 (50 mg)-Given        0849 (50 mg)-Given           meclizine (ANTIVERT) tablet 12.5 mg  Dose: 12.5 mg Freq: 3 TIMES DAILY PRN Route: PO  PRN Reason: dizziness  Start: 05/27/17 0908     1502 (12.5 mg)-Given        1038 (12.5 mg)-Given               naloxone (NARCAN) injection 0.1-0.4 mg  Dose: 0.1-0.4 mg Freq: EVERY 2 MIN PRN Route: IV  PRN Reason: opioid reversal  Start: 05/27/17 0145   Admin Instructions: For respiratory rate LESS than or EQUAL to 8.  Partial reversal dose:  0.1 mg titrated q 2 minutes for Analgesia Side Effects Monitoring Sedation Level of 3 (frequently drowsy, arousable, drifts to sleep during conversation).Full reversal dose:  0.4 mg bolus for Analgesia Side Effects Monitoring Sedation Level of 4 (somnolent, minimal or no response to stimulation).               nitroglycerin (NITROSTAT) sublingual tablet 0.4 mg  Dose: 0.4 mg Freq: EVERY 5 MIN PRN Route: SL  PRN Reason: chest pain  Start: 05/27/17 0910   Admin Instructions: Maximum 3 doses in 15 minutes.  Notify provider if no relief after 3 doses.    Do NOT give nitroglycerin SLIF the patient has taken avanafil (STENDRA), sildenafil (VIAGRA) or (REVATIO) within the last 8 hours, vardenafil (LEVITRA) or (STAXYN) within the last 18 hours, tadalafil (CIALIS) or (ADCIRCA) within the last 36 hours. Inform provider if patient has taken one of these medications.  If patient is still having acute angina requiring treatment, an alternative treatment option may be used such as: IV beta-blocker [2.5 mg - 5 mg metoprolol (LOPRESSOR)] if ordered by a " provider.               OLANZapine (zyPREXA) half-tab 1.25 mg  Dose: 1.25 mg Freq: DAILY AT 8PM Route: PO  Start: 05/31/17 2000         1915 (1.25 mg)-Given        [ ] 2000           omeprazole (priLOSEC) CR capsule 20 mg  Dose: 20 mg Freq: DAILY Route: PO  Start: 05/27/17 0800     (0810)-Not Given [C]       1958 (20 mg)-Given        2031 (20 mg)-Given        2027 (20 mg)-Given        2017 (20 mg)-Given        1915 (20 mg)-Given        [ ] 2000           ondansetron (ZOFRAN-ODT) ODT tab 4 mg  Dose: 4 mg Freq: EVERY 6 HOURS PRN Route: PO  PRN Reason: nausea  Start: 05/27/17 0145   Admin Instructions: This is Step 1 of nausea and vomiting management.  If nausea not resolved in 15 minutes, go to Step 2 prochlorperazine (COMPAZINE). Do not push through foil backing. Peel back foil and gently remove. Place on tongue immediately. Administration with liquid unnecessary              Or  ondansetron (ZOFRAN) injection 4 mg  Dose: 4 mg Freq: EVERY 6 HOURS PRN Route: IV  PRN Reasons: nausea,vomiting  Start: 05/27/17 0145   Admin Instructions: This is Step 1 of nausea and vomiting management.  If nausea not resolved in 15 minutes, go to Step 2 prochlorperazine (COMPAZINE).  Irritant.               prednisoLONE acetate (PRED FORTE) 1 % ophthalmic susp 1 drop  Dose: 1 drop Freq: EVERY 3 DAYS Route: RIGHT EYE  Start: 05/29/17 1600       (1724)-Not Given [C]       2029 (1 drop)-Given          [ ] 2000           prochlorperazine (COMPAZINE) injection 5 mg  Dose: 5 mg Freq: EVERY 6 HOURS PRN Route: IV  PRN Reasons: nausea,vomiting  Start: 05/27/17 0145   Admin Instructions: This is Step 2 of nausea and vomiting management.   If nausea not resolved in 15 minutes, give metoclopramide (REGLAN) if ordered (step 3 of nausea and vomiting management)              Or  prochlorperazine (COMPAZINE) tablet 5 mg  Dose: 5 mg Freq: EVERY 6 HOURS PRN Route: PO  PRN Reason: vomiting  Start: 05/27/17 0145   Admin Instructions: This is Step 2 of  nausea and vomiting management.   If nausea not resolved in 15 minutes, give metoclopramide (REGLAN) if ordered (step 3 of nausea and vomiting management)              Or  prochlorperazine (COMPAZINE) Suppository 12.5 mg  Dose: 12.5 mg Freq: EVERY 12 HOURS PRN Route: RE  PRN Reasons: nausea,vomiting  Start: 05/27/17 0145   Admin Instructions: This is Step 2 of nausea and vomiting management.   If nausea not resolved in 15 minutes, give metoclopramide (REGLAN) if ordered (step 3 of nausea and vomiting management)               Reason statin medication order not selected  Freq: DOES NOT GO TO MAR  PRN Reason: other  PRN Comment: hospice  Start: 06/01/17 1047             Completed Medications  Medications 05/26/17 05/27/17 05/28/17 05/29/17 05/30/17 05/31/17 06/01/17         Dose: 5 mg Freq: ONCE Route: PO  Start: 05/30/17 1800   End: 05/30/17 2017   Admin Instructions: Combined IM and PO doses may significantly increase the risk of orthostatic hypotension at 30 mg per day or higher.  With dry hands, peel back foil backing and gently remove tablet; do not push oral disintegrating tablet through foil backing; administer immediately on tongue and oral disintegrating tablet dissolves in seconds; then swallow with saliva; liquid not required.         (1646)-Not Given [C]       2017 (5 mg)-Given            Discontinued Medications  Medications 05/26/17 05/27/17 05/28/17 05/29/17 05/30/17 05/31/17 06/01/17         Dose: 3 mL Freq: EVERY 8 HOURS Route: IK  Start: 05/27/17 0915   End: 05/29/17 1727   Admin Instructions: And Q1H PRN, to lock peripheral IV dormant line.      1058 (3 mL)-Given               (0208)-Not Given       0846 (3 mL)-Given       1710 (3 mL)-Given        (0133)-Not Given       (0905)-Not Given       (1727)-Not Given       1727-Med Discontinued            Dose: 3 mL Freq: EVERY 1 HOUR PRN Route: IK  PRN Reason: line flush  PRN Comment: for peripheral IV flush post IV meds  Start: 05/27/17 0910   End:  05/29/17 1727       1727-Med Discontinued

## 2017-05-26 NOTE — IP AVS SNAPSHOT
[facility]    [address] [address]    Phone:  [phone]    Fax:  [phone]                                       After Visit Summary   5/26/2017    Angela Esposito    MRN: [id number]           After Visit Summary Signature Page     I have received my discharge instructions, and my questions have been answered. I have discussed any challenges I see with this plan with the nurse or doctor.    ..........................................................................................................................................  Patient/Patient Representative Signature      ..........................................................................................................................................  Patient Representative Print Name and Relationship to Patient    ..................................................               ................................................  Date                                            Time    ..........................................................................................................................................  Reviewed by Signature/Title    ...................................................              ..............................................  Date                                                            Time

## 2017-05-26 NOTE — IP AVS SNAPSHOT
"          [facility]: [phone]                                              INTERAGENCY TRANSFER FORM - LAB / IMAGING / EKG / EMG RESULTS   2017                    Hospital Admission Date: 2017  DOROTA DINH   : 1916  Sex: Female        Attending Provider: Darin Bliss MD     Allergies:  Sulfa Drugs    Infection:  None   Service:  HOSPITALIST    Ht:  1.549 m (5' 1\")   Wt:  57.3 kg (126 lb 5.2 oz)   Admission Wt:  57.3 kg (126 lb 5.2 oz)    BMI:  23.87 kg/m 2   BSA:  1.57 m 2            Patient PCP Information     Provider PCP Type    ALICE Cornejo CNP General         Lab Results - 3 Days      Basic metabolic panel [[id number]] (Abnormal)  Resulted: 17 0712, Result status: Final result    Ordering provider: Donn eBntley MD  17 0000 Resulting lab: [facility]    Specimen Information    Type Source Collected On   Blood  17 0640          Components       Value Reference Range Flag Lab   Sodium 143 133 - 144 mmol/L  59   Potassium 3.9 3.4 - 5.3 mmol/L  59   Chloride 110 94 - 109 mmol/L H 59   Carbon Dioxide 24 20 - 32 mmol/L  59   Anion Gap 9 3 - 14 mmol/L  59   Glucose 92 70 - 99 mg/dL  59   Urea Nitrogen 17 7 - 30 mg/dL  59   Creatinine 0.67 0.52 - 1.04 mg/dL  59   GFR Estimate 80 >60 mL/min/1.7m2  59   Comment:  Non  GFR Calc   GFR Estimate If Black -- >60 mL/min/1.7m2  59   Result:         >90   GFR Calc     Calcium 8.6 8.5 - 10.1 mg/dL  59   Result:              CBC with platelets differential [[id number]]  Resulted: 17 0655, Result status: Final result    Ordering provider: Donn Bentley MD  17 0000 Resulting lab: [facility]    Specimen Information    Type Source Collected On   Blood  1740          Components       Value Reference Range Flag Lab   WBC 6.9 4.0 - 11.0 10e9/L  59   RBC Count 4.30 3.8 - 5.2 10e12/L  59   Hemoglobin 13.4 11.7 " - 15.7 g/dL  59   Hematocrit 40.7 35.0 - 47.0 %  59   MCV 95 78 - 100 fl  59   MCH 31.2 26.5 - 33.0 pg  59   MCHC 32.9 31.5 - 36.5 g/dL  59   RDW 14.0 10.0 - 15.0 %  59   Platelet Count 224 150 - 450 10e9/L  59   Diff Method Automated Method   59   % Neutrophils 58.4 %  59   % Lymphocytes 25.4 %  59   % Monocytes 11.5 %  59   % Eosinophils 3.8 %  59   % Basophils 0.6 %  59   % Immature Granulocytes 0.3 %  59   Absolute Neutrophil 4.1 1.6 - 8.3 10e9/L  59   Absolute Lymphocytes 1.8 0.8 - 5.3 10e9/L  59   Absolute Monocytes 0.8 0.0 - 1.3 10e9/L  59   Absolute Eosinophils 0.3 0.0 - 0.7 10e9/L  59   Absolute Basophils 0.0 0.0 - 0.2 10e9/L  59   Abs Immature Granulocytes 0.0 0 - 0.4 10e9/L  59            Testing Performed By     Lab - Abbreviation Name Director Address Valid Date Range    59 - Unknown [facility] Unknown [address] [address] 12/31/14 1006 - Present            Unresulted Labs (24h ago through future)    Start       Ordered    05/30/17 0600  Platelet count  (Pharmacological Prophylaxis - enoxaparin (LOVENOX) *Use only if creatinine clearance is greater than 30 mL/min)  EVERY THREE DAYS,   Routine     Comments:  Repeat every 3 days while on VTE prophylaxis.  Notify provider and hold enoxaparin if platelet count falls by 50% of baseline. If no result is listed, this lab has not been done the past 365 days. LATEST LAB RESULT: Platelet Count (10e9/L)       Date                     Value                 05/27/2017               220              ----------    05/27/17 0908    05/30/17 0000  Creatinine  (Pharmacological Prophylaxis - enoxaparin (LOVENOX) *Use only if creatinine clearance is greater than 30 mL/min)  EVERY THREE DAYS,   Routine     Comments:  Repeat every 3 days while on VTE prophylaxis.    05/27/17 0908      Encounter-Level Documents:     There are no encounter-level documents.      Order-Level Documents:     There are no order-level documents.

## 2017-05-26 NOTE — IP AVS SNAPSHOT
"` `           [facility] SURGICAL: [phone]                                              INTERAGENCY TRANSFER FORM - NURSING   2017                    Hospital Admission Date: 2017  DOROTA DINH   : 1916  Sex: Female        Attending Provider: Darin Bliss MD     Allergies:  Sulfa Drugs    Infection:  None   Service:  HOSPITALIST    Ht:  1.549 m (5' 1\")   Wt:  57.3 kg (126 lb 5.2 oz)   Admission Wt:  57.3 kg (126 lb 5.2 oz)    BMI:  23.87 kg/m 2   BSA:  1.57 m 2            Patient PCP Information     Provider PCP Type    ALICE Cornejo CNP General      Current Code Status     Date Active Code Status Order ID Comments User Context       Prior      Code Status History     Date Active Date Inactive Code Status Order ID Comments User Context    2017 10:46 AM  DNR/DNI [id number]  Darin Bliss MD Outpatient    2017  1:45 AM 2017 10:46 AM DNR/DNI [id number]  David Hernandez MD Inpatient    2013  5:38 PM 2017  1:45 AM DNR [id number]  Angelia Nayak NP Outpatient    10/19/2009  3:11 PM 2013  5:38 PM DNR None  Kat Muñoz Demographics      Advance Directives        Does patient have a scanned Advance Directive/ACP document in EPIC?           Yes        Hospital Problems as of 2017              Priority Class Noted POA    Essential hypertension   2006 Yes    Benign tumor of brain (H)   3/9/2010 Yes    Macular degeneration   2010 Yes    GERD (gastroesophageal reflux disease)   2012 Yes    Glaucoma   2015 Yes    Unsteady gait Medium  2017 Yes    Fall Medium  2017 Yes    Falls Medium  2017 Yes      Non-Hospital Problems as of 2017              Priority Class Noted    Disorder of bone and cartilage   2006    Diverticulosis of large intestine   2006    Polyneuropathy in other diseases classified elsewhere (H)   2006    Personal history of benign neoplasm of brain   2006    " Generalized osteoarthrosis, unspecified site   12/12/2007    Personal history of fall   4/23/2008    Fear   5/11/2009    DDD (degenerative disc disease), lumbar   5/20/2009    Simple dental caries   1/28/2010    Sensorineural hearing loss, bilateral   4/22/2010    Vertigo   5/20/2010    CARDIOVASCULAR SCREENING; LDL GOAL LESS THAN 130   10/31/2010    Lipid disorder   12/10/2010    Retinal artery occlusion   12/13/2010    Vision loss   12/16/2010    Advanced directives, counseling/discussion   3/12/2012    Pain in shoulder   2/4/2013    At risk for falling   2/4/2013      Immunizations     Name Date      DPT 07/29/02     DPT 01/01/91     Influenza (H1N1) 01/08/10     Influenza (High Dose) 3 valent vaccine 09/27/16     Influenza (High Dose) 3 valent vaccine 10/07/15     Influenza (High Dose) 3 valent vaccine 10/06/14     Influenza (IIV3) 11/01/12     Influenza (IIV3) 10/30/08     Influenza (IIV3) 11/01/07     Influenza (IIV3) 10/19/05     Pneumococcal (PCV 13) 01/03/17     Pneumococcal 23 valent 08/30/07     TD (ADULT, 7+) 07/29/02          END      ASSESSMENT     Discharge Profile Flowsheet     EXPECTED DISCHARGE     SKIN      Expected Discharge Date  05/31/17 05/30/17 1340   Inspection  Full 06/01/17 0907    DISCHARGE NEEDS ASSESSMENT     Skin areas NOT inspected  Coccyx;Sacrum;Buttock, right;Buttock, left 05/31/17 0307    Equipment Currently Used at Home  grab bar;raised toilet;shower chair;walker, rolling 05/27/17 1223   Skin WDL  ex;color 06/01/17 0907    Transportation Available  family or friend will provide 05/27/17 1406   Skin Color/Characteristics  bruised (ecchymotic) 06/01/17 0907    # of Referrals Placed by [facility]  Homecare 05/27/17 1406   Skin Temperature  cool 06/01/17 0907    Does Patient Need a Referral for Clinic CC  Yes 05/27/17 1406   Skin Moisture  dry 06/01/17 0907    GASTROINTESTINAL (ADULT,PEDIATRIC,OB)     Skin Elasticity  slow return to original state 06/01/17 0907    GI WDL  WDL 06/01/17 0907    "Skin Integrity  bruise(s) 06/01/17 0907    Last Bowel Movement  05/30/17 05/31/17 1532   Additional Documentation  Wound (LDA) 05/28/17 0022    Passing flatus  yes 06/01/17 0907   SAFETY      COMMUNICATION ASSESSMENT     Safety WDL  ex 06/01/17 0907    Patient's communication style  spoken language (English or Bilingual) 05/27/17 0129                      Assessment WDL (Within Defined Limits) Definitions           Safety WDL     Effective: 09/28/15    Row Information: <b>WDL Definition:</b> Bed in low position, wheels locked; call light in reach; upper side rails up x 2; ID band on<br> <font color=\"gray\"><i>Item=AS safety wdl>>List=AS safety wdl>>Version=F14</i></font>      Skin WDL     Effective: 09/28/15    Row Information: <b>WDL Definition:</b> Warm; dry; intact; elastic; without discoloration; pressure points without redness<br> <font color=\"gray\"><i>Item=AS skin wdl>>List=AS skin wdl>>Version=F14</i></font>      Vitals     Vital Signs Flowsheet     VITAL SIGNS     HEIGHT AND WEIGHT      Temp  98  F (36.7  C) 06/01/17 0802   Height  1.549 m (5' 1\") 05/27/17 0118    Temp src  Oral 06/01/17 0802   Weight  57.3 kg (126 lb 5.2 oz) 05/27/17 0118    Resp  18 06/01/17 0802   BSA (Calculated - sq m)  1.57 05/27/17 0118    Pulse  59 05/31/17 1313   BMI (Calculated)  23.92 05/27/17 0118    Heart Rate  64 06/01/17 0802   SHELLY COMA SCALE      Pulse/Heart Rate Source  Monitor 06/01/17 0802   Best Eye Response  4-->(E4) spontaneous 05/31/17 0303    BP  179/56 06/01/17 0802   Best Motor Response  6-->(M6) obeys commands 05/31/17 0303    BP Location  Right arm 06/01/17 0802   Best Verbal Response  4-->(V4) confused 05/31/17 0303    OXYGEN THERAPY     Shelly Coma Scale Score  14 05/31/17 0303    SpO2  90 % 06/01/17 0802   POSITIONING      O2 Device  None (Room air) 06/01/17 0802   Body Position  supine, head elevated 06/01/17 0907    PAIN/COMFORT     Head of Bed (HOB)  HOB at 60-90 degrees 06/01/17 0907    Patient " Currently in Pain  denies 05/31/17 0034   Positioning/Transfer Devices  pillows 06/01/17 0231    Patient's Stated Pain Goal  No pain 05/31/17 0034   Chair  Recline and up in chair 06/01/17 0935    0-10 Pain Scale  2 05/26/17 2025   DAILY CARE      FACES Pain Rating  0-->no hurt 05/31/17 0034   Activity Type  up in chair 06/01/17 0935    Pain Location  Neck 05/30/17 2250   Activity Level of Assistance  assistance, 2 people 06/01/17 0935    Pain Orientation  Posterior 05/30/17 2250   Activity Assistive Device  mechanical lift 06/01/17 0935    Pain Descriptors  Aching;Burning;Discomfort;Dull 05/30/17 2250   ECG      Pain Intervention(s)  Medication (See eMAR);Repositioned 05/30/17 2250   ECG Rhythm  Sinus rhythm;Right bundle branch block 05/27/17 1757    ANALGESIA SIDE EFFECTS MONITORING     ND Interval  .13 05/27/17 1757    Side Effects Monitoring: Respiratory Quality  R 05/31/17 0303   QRS Interval  .13 05/27/17 1757    Side Effects Monitoring: Respiratory Depth  N 05/31/17 0303   QT Interval  .41 05/27/17 1757    Side Effects Monitoring: Sedation Level  S 05/31/17 0303   Lead Monitored  Lead II 05/27/17 1757            Patient Lines/Drains/Airways Status    Active LINES/DRAINS/AIRWAYS     Name: Placement date: Placement time: Site: Days: Last dressing change:    Wound 05/27/17 Left;Mid Elbow Skin tear 05/27/17   2015   Elbow   4             Patient Lines/Drains/Airways Status    Active PICC/CVC     None            Intake/Output Detail Report     Date Intake   Output Net    Shift P.O. IV Piggyback Total Urine Total       Noc 05/30/17 2300 - 05/31/17 0659 -- -- -- -- -- 0    Day 05/31/17 0700 - 05/31/17 1459 670 -- 670 -- -- 670    Awilda 05/31/17 1500 - 05/31/17 2259 -- -- -- -- -- 0    Noc 05/31/17 2300 - 06/01/17 0659 -- -- -- -- -- 0    Day 06/01/17 0700 - 06/01/17 1459 -- -- -- -- -- 0      Last Void/BM       Most Recent Value    Urine Occurrence 1 at 06/01/2017 0654    Stool Occurrence 1 at 05/31/2017 1411       Case Management/Discharge Planning     Case Management/Discharge Planning Flowsheet     REFERRAL INFORMATION     EXPECTED DISCHARGE      Admission Type  inpatient 05/27/17 1404   Expected Discharge Date  05/31/17 05/30/17 1340    Referral Source  interdisciplinary rounds 05/27/17 1404   DISCHARGE PLANNING      # of Referrals Placed by [facility]  Homecare 05/27/17 1406   Transportation Available  family or friend will provide 05/27/17 1406    Reason For Consult  discharge planning 05/27/17 1404   Does Patient Need a Referral for Clinic CC  Yes 05/27/17 1406    Primary Care Clinic Name  [facility] 05/27/17 1406   FINAL RESOURCES      Primary Care MD Name  Devi May, ALICE SIMMONS 05/27/17 1406   Equipment Currently Used at Home  grab bar;raised toilet;shower chair;walker, rolling 05/27/17 1223    LIVING ENVIRONMENT     ABUSE RISK SCREEN      Lives With  facility resident 05/27/17 1406   QUESTION TO PATIENT:  Has a member of your family or a partner(now or in the past) intimidated, hurt, manipulated, or controlled you in any way?  no 05/27/17 0129    Living Arrangements  assisted living 05/27/17 1406   QUESTION TO PATIENT: Do you feel safe going back to the place where you are living?  yes 05/27/17 0129    Able to Return to Prior Living Arrangements  yes 05/27/17 1406   OBSERVATION: Is there reason to believe there has been maltreatment of a vulnerable adult (ie. Physical/Sexual/Emotional abuse, self neglect, lack of adequate food, shelter, medical care, or financial exploitation)?  no 05/27/17 0129    COPING/STRESS     (R) MENTAL HEALTH SUICIDE RISK      Major Change/Loss/Stressor  hospitalization 05/28/17 0925   Are you depressed or being treated for depression?  No 05/27/17 0129

## 2017-05-26 NOTE — IP AVS SNAPSHOT
` ` Patient Information     Patient Name Sex     Angela Esposito ([id number]) Female 1916       Room Bed    2401 [id number]      Patient Demographics     Address Phone    [address]    [facility] [address] [phone] (Home)  [phone] (Mobile)      Patient Ethnicity & Race     Ethnic Group Patient Race    American White      Emergency Contact(s)     Name Relation Home Work Mobile    Ernesto Esposito Son [phone]    HALEIGH BERRIOS Daughter [phone]      humberto esposito Relative [phone]        Documents on File        Status Date Received Description       Documents for the Patient    Privacy Notice - [place] Received 12/22/10     Insurance Card Received 04/04/15 medicare    Insurance Card  05     Face Sheet  () 05     Insurance Card Received 04/04/15 uai    Face Sheet Received () 09     External Medication Information Consent Accepted () 09     Face Sheet Received () 06/28/10     Patient ID Received 17     Consent for Services - Hospital/Clinic Received () 11/19/10     Privacy Notice - [place] Received 11/19/10     Privacy Notice - [place] Received 11/19/10     HIM RHONDA Authorization   Daughter Haleigh Berrios & son Rowdy    HIM RHONDA Authorization   11/19/10 [facility]    HIM RHONDA Authorization   [facility]    External Medication Information Consent Accepted () 11     HIM RHONDA Authorization   [facility]    Consent for Services - Hospital/Clinic Received () 07/10/11     Power of  Received 12/16/10     Advance Directives and Living Will Received 10/21/09     Consent for Services - Hospital/Clinic Received () 12     External Medication Information Consent Accepted 12     Consent for EHR Access  13 Copied from existing Consent for services - C/HOD collected on 2012    Advance Directives and Living Will Received 13 POLST-   13    [facility] Specified Other       Consent for Services - Hospital/Clinic Received () 14     Consent for Services - Hospital/Clinic Received () 02/23/15     Consent to Communicate   Consent to Communicate 3/2/15    Consent for Services/Privacy Notice - Hospital/Clinic Received () 16     Consent for Services/Privacy Notice - Hospital/Clinic Received 17        Documents for the Encounter    CMS IM for Patient Signature Received 17     CMS IM for Patient Signature Received 17 [facility]    ECG   ECG Report      Admission Information     Attending Provider Admitting Provider Admission Type Admission Date/Time    Darin Bliss MD Kampfe, Donn COONEY MD Emergency 17  2016    Discharge Date Hospital Service Auth/Cert Status Service Area     Hospitalist [facility] SERVICES    Unit Room/Bed Admission Status       [facility] 240/240- Admission (Confirmed)       Admission     Complaint    Unsteady gait, Fall, Falls      Hospital Account     Name Acct ID Class Status Primary Coverage    Angela Esposito [id number] Inpatient Open MEDICARE - MEDICARE            Guarantor Account (for Hospital Account #[id number])     Name Relation to Pt Service Area Active? Acct Type    Angela Esposito  FCS Yes Personal/Family    Address Phone          [address] [place] [address] [phone](H)              Coverage Information (for Hospital Account #[id number])     1. MEDICARE/MEDICARE     F/O Payor/Plan Precert #    MEDICARE/MEDICARE     Subscriber Subscriber #    Angela Esposito [id number]    Address Phone    [employer]  [address]  [facility] [place] [address] [phone]          2. COMMERCIAL/[employer] INS[employer]ANCE     F/O Payor/Plan Precert #    COMMERCIAL/[employer] INS[employer]ANCE     Subscriber Subscriber #    Angela Esposito [id number]    Address Phone    [address]  [place] [address] [phone]

## 2017-05-26 NOTE — LETTER
Pt is discharging to [facility] (Phone: [phone]) Fax: ([phone]) LTC MCU today with  Hospice. Swapna DE LEON, [facility], [facility] [phone]

## 2017-05-26 NOTE — IP AVS SNAPSHOT
` `           [facility] SURGICAL: [phone]                 INTERAGENCY TRANSFER FORM - NOTES (H&P, Discharge Summary, Consults, Procedures, Therapies)   2017                    Hospital Admission Date: 2017  ANGELA DINH   : 1916  Sex: Female        Patient PCP Information     Provider PCP Type    ALICE Cornejo CNP General         History & Physicals      H&P by Donn Bentley MD at 2017  8:38 AM     Author:  Donn Bentley MD Service:  Hospitalist Author Type:  Physician    Filed:  2017  9:15 AM Date of Service:  2017  8:38 AM Creation Time:  2017  8:30 AM    Status:  Signed :  Donn Bentley MD (Physician)         [facility] History and Physical    Angela Dinh MRN# [id number]   Age: 89 year old YOB: 1916     Date of Admission:  2017      Primary care provider: Devi May [facility]          Assessment and Plan:[KK1.1]   Falls[KK1.2] with longstanding history of[KK1.3]   Unsteady gait[KK1.2]  2017 -- long-standing problem but appears acutely worse past few days. No clear lab abnormalities on admission - checking UA and chest x-ray today.  Evaluating chest pain as below, but doubt MI or arrythmia as cause but will place on telemetry overnight.  PE possibility but again unlikely - checking D-dimer.  Doubt seizures as discussed below.  Has a history of vertigo in the past and reports continued dizziness with ambulation - will try some meclizine as needed, physical therapy and occupational therapy consulted.  May need increased services from current memory care.      Chest pain  2017 -- unclear timing on this - suspect most likely musculoskeletal but with increased falls without clear cause will check troponin and ECG to help with determining possible etiology and with prognosis although no intervention planned beyond likely starting aspirin if either shows clear acute change,  otherwise in context of falls will hold off on adding aspirin for now.  Also checking d-dimer as above.   Giving GI cocktail, checking chest x-ray.[KK1.3]        GERD (gastroesophageal reflux disease)[KK1.2]  5/27/2017 -- continuing home omeprazole, giving GI cocktail x 1 as patient thinks this could be causing her chest pain as above.[KK1.3]        Essential hypertension[KK1.2]  5/27/2017 -- blood pressure up, has been like this in the past and actually a bit higher when in pain.  Just given AM Cozaar - recheck in 1 hour.        History of[KK1.3] Benign tumor of brain (H)[KK1.2]  5/27/2017 -- no new changes seen on head CT.  She was apparently on keppra in the past for seizure prophylaxis, but isn't currently.  Seizures are a possible cause of her falls, but doesn't sounds clearly like that.  given no new changes on imaging and no symptoms clearly pointing to seizures will not restart this at present.[KK1.3]        Macular degeneration[KK1.2]/glaucoma  5/27/2017 -- patient is legally blind - staff to announce and identify themselves on entering room.  Awaiting dosing of latanoprost and prednisolone eye drops.[KK1.3]     Prophylaxis[KK1.1]  startin lovenox[KK1.3]    Lines[KK1.1]  PIV[KK1.3]    Disposition[KK1.1]  Admitting to inpatient - will be here for at least a second midnight, very likely longer, needing ongoing evaluation for unclear source of acute change in falls and for chest pain as above - do not anticipate any aggressive therapies with primary goal comfort/quality, but still needing ongoing evaluation and determination of disposition.[KK1.3]                Chief Complaint:[KK1.1]   falls  History is obtained from the patient[KK1.3]          History of Present Illness:   This patient is a 89 year old[KK1.1] [KK1.3] female[KK1.1] with a significant past medical history of dementia, benign brain tumor, hypertension, GERD, macular degeneration/glaucoma with legal blindness, and prior falls living  "in memory care[KK1.3] who presents with[KK1.1] increase in falls.  She fell twice yesterday, leading to her presentation in the ER last night and says she has fallen 4 times in the past 2 days.  Has had multiple falls prior to that, but unclear exactly how often, sounds like certainly not this frequently.  She reportedly has hit her head multiple times, but doesn't think any loss of consciousness.  On presentation to the ER she was complaining of focal pain in the left knee and denies chest pain or dyspnea.  Has not had headache or pain in the head last night or this AM.  Did say neck was mildly sore last night.  She does tell me that she's had some chest pain, although denied this in the ER last night -hard to tease out the timing of the pain - says she had it yesterday, then resolved with tylenol, then back overnight.  Currently sore in left mid lateral chest, worse with movement.  Thinks this may be her heartburn as that has been giving her worse symptoms \"for a while\" she says.  Denies dyspnea.  Some radiation to left arm, no palpitations, chest heaviness, pressure or tightness.  She does say she feels somewhat \"achy all over\" today, but no focal areas of pain today other than the chest pain.  Says she still feels dizzy with spinning sensation when she was up overnight.  No vision changes (legally blind).  No changes in speech or swallowing.  Denies any loss of bowel or bladder control with falls and there wasn't any report of this.  Mental status appears at baseline - some inconsistency in reported history, but clear and coherent and oriented and responding to questions about current symptoms consistently.      Lives in memory care    Non-smoker, no alcohol, no illicit drug use.[KK1.3]               Past Medical History:   I have reviewed this patient's past medical history.  Patient Active Problem List    Diagnosis Date Noted     Unsteady gait 05/27/2017     Priority: Medium     Fall 05/27/2017     Priority: " Medium     Glaucoma 06/22/2015     Priority: Medium     Follows with Dr. Mckeon at [facility]       Pain in shoulder 02/04/2013     Priority: Medium     right shoulder and decreased range of motion         At risk for falling 02/04/2013     Priority: Medium     GERD (gastroesophageal reflux disease) 07/11/2012     Priority: Medium     Advanced directives, counseling/discussion 03/12/2012     Priority: Medium     Advance Care Planning:   Receipt of ACP document:  Received: POLST which was signed and dated by provider on 5/29/2013  .  Document not previously scanned.  Reviewed for validity as medical order and sent to be scanned.  Code Status needs to be updated to reflect choices in most recent ACP document. Orders placed.   See permanent comments section of demographics in clinical tab. View document(s) and details by clicking on code status.   Added by Jessica Andujar on 6/24/2013.    Discussed advance care planning with patient; information given to patient to review. 3/12/2012        Vision loss 12/16/2010     Priority: Medium     Complete loss--left eye.       Macular degeneration 12/13/2010     Priority: Medium     Legally blind         Retinal artery occlusion 12/13/2010     Priority: Medium     Lipid disorder 12/10/2010     Priority: Medium     CARDIOVASCULAR SCREENING; LDL GOAL LESS THAN 130 10/31/2010     Priority: Medium     Vertigo 05/20/2010     Priority: Medium     Vertigo, most likely due to vestibular neuritis.   neuro assessment 4/10       Sensorineural hearing loss, bilateral 04/22/2010     Priority: Medium     Benign tumor of brain (H) 03/09/2010     Priority: Medium     March 9, 2010 MRI done after episode of dizziness  IMPRESSION: No significant change from 08/06/2009. Subfrontal mass  with associated trapped arachnoid cyst in parenchymal T2 signal  changes which could be due to edema or gliosis. Atrophy with some  chronic white matter changes. No evidence for acute infarct, acute  hemorrhage  or any acute process.                Simple dental caries 01/28/2010     Priority: Medium     Dr Ana Evans dentist called to report that Angela has dental caries and  is at the dental office for extraction- and due to hx of radiation is unable to do this depending on the level of radiation- will look      (Problem list name updated by automated process. Provider to review and confirm.)       DDD (degenerative disc disease), lumbar 05/20/2009     Priority: Medium     Central stenosis identified 2002 neuro consult recommendation epidural injection with improvement  Recurrence of pain 5/09 right lumbar with radiation to leg - will try epidural again       Fear 05/11/2009     Priority: Medium     She worries that someone is stealing from her or getting into her appartment - this has been a concern for last since 2005 and camera surveillance showed nothing.         Personal history of fall 04/23/2008     Priority: Medium     Generalized osteoarthrosis, unspecified site 12/12/2007     Priority: Medium     NECK CHANGES ON XRAY - CHIROPRACT       Disorder of bone and cartilage 02/14/2006     Priority: Medium     Foxamax - improvement seen last dex 2003.  Falls with seizure 7/05 and 2/06 - no fracture  Problem list name updated by automated process. Provider to review       Diverticulosis of large intestine 02/14/2006     Priority: Medium     COLONOSCOPY 2002  Problem list name updated by automated process. Provider to review       Essential hypertension 02/14/2006     Priority: Medium     Losartan DC lisinopril due to cough  Problem list name updated by automated process. Provider to review       Polyneuropathy in other diseases classified elsewhere (H) 02/14/2006     Priority: Medium     Numbness in feet       Personal history of benign neoplasm of brain 02/14/2006     Priority: Medium     ON kEPPRA FOR SEIZURE PROPHALAXIS - dose increased after fall in bathroom 2/06  left frontal meningioma 2004 with radiation therapy  no surgery. She received fractionated sterotactic radiotherapy at [facility] from 9/20/04 through 11/2/04. She received a total dose of 5040 cGy (centi-Gray units) to the left frontal area in 28 fractions      Problem list name updated by automated process. Provider to review                Past Surgical History:   I have reviewed this patient's past surgical history   Past Surgical History:   Procedure Laterality Date     C VAGINAL HYSTERECTOMY      Hysterectomy, Vaginal     CATARACT IOL, RT/LT      Cataract IOL RT     CATARACT IOL, RT/LT  7/98    Cataract IOL LT     CL AFF SURGICAL PATHOLOGY      leiomyoma  One ovary remains     COLONOSCOPY  2002    neg     SURGICAL HISTORY OF -   09/2004    Radiation Therapy Brain Tumor-Slow growing tumor and not malignant as per patient             Social History:   I have reviewed this patient's social history   Social History   Substance Use Topics     Smoking status: Never Smoker     Smokeless tobacco: Never Used     Alcohol use No             Family History:   I have reviewed this patient's family history  Family History   Problem Relation Age of Onset     CANCER Mother      eye     CANCER Father      lungs or kidneys     Breast Cancer Sister      CANCER Sister      ?ovarian     Prostate Cancer Brother      CANCER Brother      ear and lung             Immunizations:   Immunizations are current          Allergies:     Allergies   Allergen Reactions     Sulfa Drugs              Medications:     Prescriptions Prior to Admission   Medication Sig Dispense Refill Last Dose     multivitamin, therapeutic with minerals (MULTI-VITAMIN) TABS tablet Take 1 tablet by mouth daily Tab-A-Burak 90 tablet 3 Unknown at Unknown time     losartan (COZAAR) 25 MG tablet Take 1 tablet (25 mg) by mouth daily 90 tablet 3 Unknown at Unknown time     CALCIUM-VITAMIN D PO Daily , unknown dose   Unknown at Unknown time     ketoconazole (NIZORAL) 2 % shampoo Apply to the affected  "area and wash off after 5 minutes. 120 mL 1 Taking     order for DME Equipment being ordered: Wheelchair 1 Units 0 Unknown at Unknown time     latanoprost (XALATAN) 0.005 % ophthalmic solution    Unknown at Unknown time     omeprazole 20 MG tablet Take one capsule by mouth once daily 30-60 minutes before a meal. 90 tablet 1 Unknown at Unknown time     prednisoLONE acetate (PRED FORTE) 1 % ophthalmic suspension    Unknown at Unknown time     Glucosamine-Chondroit-Vit C-Mn (GLUCOSAMINE CHONDROITIN COMPLX) TABS Take 1 tablet by mouth 3 times daily.   Unknown at Unknown time     TYLENOL CAPS 500 MG OR 1 CAPSULE EVERY 4 HOURS AS NEEDED   Unknown at Unknown time             Review of Systems:[KK1.1]    ROS: 10 point ROS neg other than the symptoms noted above in the HPI.[KK1.3]           Physical Exam:   Blood pressure 197/59, temperature 97.3  F (36.3  C), temperature source Oral, resp. rate 16, height 1.549 m (5' 1\"), weight 57.3 kg (126 lb 5.2 oz), SpO2 97 %, not currently breastfeeding.  Temperatures:  Current - Temp: 97.3  F (36.3  C); Max - Temp  Av.5  F (36.4  C)  Min: 97.3  F (36.3  C)  Max: 97.8  F (36.6  C)  Respiration range: Resp  Av.3  Min: 16  Max: 18  Pulse range: No Data Recorded  Blood pressure range: Systolic (24hrs), Av , Min:176 , Max:203   ; Diastolic (24hrs), Av, Min:59, Max:78    Pulse oximetry range: SpO2  Av.5 %  Min: 93 %  Max: 97 %  No intake or output data in the 24 hours ending 17 0830[KK1.1]  EXAM:  General: awake and alert, NAD, oriented x 3  Head: normocephalic  Neck: unremarkable, no lymphadenopathy   HEENT: oropharynx pink and moist    Heart: Regular rate and rhythm, 1-2/6 systolic murmur, no rubs, or gallops  Lungs: clear to auscultation bilaterally with good air movement throughout  Abdomen: soft, non-tender, no masses or organomegaly  Extremities: no edema in lower extremities   Skin unremarkable.[KK1.3]             Data:     Results for orders placed or " performed during the hospital encounter of 05/26/17 (from the past 24 hour(s))   CT Head w/o Contrast    Narrative    CT HEAD W/O CONTRAST   5/26/2017 8:48 PM     HISTORY: falls    TECHNIQUE: Axial images of the head without IV contrast material.  Radiation dose for this scan was reduced using automated exposure  control, adjustment of the mA and/or kV according to patient size, or  iterative reconstruction technique.    COMPARISON: MR scan dated to/17/2014    FINDINGS: Again noted is a partially calcified and partially cystic  mass in the inferior frontal region. This corresponds to the enhancing  mass seen on the previous MR scan. This probably is due to a  meningioma. It has not changed significantly in size since the  previous scan. There is generalized atrophy of the brain.  Areas of  low attenuation are present in the white matter of the cerebral  hemispheres that are consistent with small vessel ischemic disease in  this age patient. There is no evidence of intracranial hemorrhage,  mass, acute infarct or anomaly. The visualized portions of the sinuses  and mastoids appear normal. No intracranial bleed or skull fractures  are identified..      Impression    IMPRESSION:   1. No intracranial bleed. No skull fractures.  2. Atrophy of the brain.  White matter changes consistent with small  vessel ischemic disease.   3. No significant change in the partially calcified and partially  cystic subfrontal mass. This is consistent with a meningioma.    MITESH ZAPIEN MD   Cervical spine CT w/o contrast    Narrative    CT CERVICAL SPINE W/O CONTRAST 5/26/2017 8:50 PM     HISTORY:  fall, neck pain     TECHNIQUE:  Axial images of the cervical spine were obtained without  intravenous contrast. Coronal and sagittal reformations were  performed.  Radiation dose for this scan was reduced using automated  exposure control, adjustment of the mA and/or kV according to patient  size, or iterative reconstruction  technique.    COMPARISON: None.    FINDINGS: There are 7  cervical type vertebrae used for the purpose of  this dictation. The alignment of the cervical spine is normal.   No  cervical spine fracture identified.    C1-C2:  Normal alignment.     C2-C3:  Normal disc. Severe degenerative change in the facet joints.     C3-C4:  Loss of disc space height. Central canal normal. Degenerative  change in the facet joints.     C4-C5:  Loss of disc space height. Central canal normal. Degenerative  change in the facet joints.      C5-C6:  Loss of disc space height. Central canal normal.     C6-C7:  Loss of disc space height. Central canal normal.    C7-T1:  Loss of disc space height. Central canal normal      Impression    IMPRESSION:   1. No fractures.  2. Multilevel degenerative change.    MITESH ZAPIEN MD   Knee XR, 2 view, right    Narrative    XR KNEE RT 1 /2 VW  5/26/2017 8:58 PM     HISTORY:  fall, pain, no swelling or erythema    COMPARISON: Film dated 4/7/2016.    FINDINGS:  Mild chondrocalcinosis. No fractures. Small joint effusion  appears to be present. Vascular calcifications.      Impression    IMPRESSION: No fractures are identified.    MITESH ZAPIEN MD   Basic metabolic panel   Result Value Ref Range    Sodium 145 (H) 133 - 144 mmol/L    Potassium 4.0 3.4 - 5.3 mmol/L    Chloride 109 94 - 109 mmol/L    Carbon Dioxide 25 20 - 32 mmol/L    Anion Gap 11 3 - 14 mmol/L    Glucose 131 (H) 70 - 99 mg/dL    Urea Nitrogen 15 7 - 30 mg/dL    Creatinine 0.80 0.52 - 1.04 mg/dL    GFR Estimate 66 >60 mL/min/1.7m2    GFR Estimate If Black 80 >60 mL/min/1.7m2    Calcium 8.8 8.5 - 10.1 mg/dL   CBC with platelets differential   Result Value Ref Range    WBC 6.0 4.0 - 11.0 10e9/L    RBC Count 4.09 3.8 - 5.2 10e12/L    Hemoglobin 12.6 11.7 - 15.7 g/dL    Hematocrit 39.2 35.0 - 47.0 %    MCV 96 78 - 100 fl    MCH 30.8 26.5 - 33.0 pg    MCHC 32.1 31.5 - 36.5 g/dL    RDW 13.8 10.0 - 15.0 %    Platelet Count 211 150 - 450 10e9/L    Diff  Method Automated Method     % Neutrophils 55.7 %    % Lymphocytes 25.5 %    % Monocytes 14.9 %    % Eosinophils 2.7 %    % Basophils 1.0 %    % Immature Granulocytes 0.2 %    Absolute Neutrophil 3.3 1.6 - 8.3 10e9/L    Absolute Lymphocytes 1.5 0.8 - 5.3 10e9/L    Absolute Monocytes 0.9 0.0 - 1.3 10e9/L    Absolute Eosinophils 0.2 0.0 - 0.7 10e9/L    Absolute Basophils 0.1 0.0 - 0.2 10e9/L    Abs Immature Granulocytes 0.0 0 - 0.4 10e9/L[KK1.1]     All cardiac studies reviewed by me.  All imaging studies reviewed by me.[KK1.3]    Attestation:  I have reviewed today's vital signs, notes, medications, labs and imaging.  Amount of time performed on this history and physical:[KK1.1] 65[KK1.3] minutes.        Donn Bentley MD[KK1.1]       Revision History        User Key Date/Time User Provider Type Action    > KK1.2 5/27/2017  9:15 AM Donn Bentley MD Physician Sign     KK1.3 5/27/2017  8:51 AM Donn Bentley MD Physician      KK1.1 5/27/2017  8:30 AM Donn Bentley MD Physician                   Discharge Summaries     No notes of this type exist for this encounter.         Consult Notes      Consults by Magui Carreno APRN CNP at 5/31/2017  4:38 PM     Author:  Mgaui Carreno APRN CNP Service:  Palliative Author Type:  Nurse Practitioner    Filed:  5/31/2017  4:57 PM Date of Service:  5/31/2017  4:38 PM Creation Time:  5/31/2017  4:38 PM    Status:  Signed :  Magui Carreno APRN CNP (Nurse Practitioner)     Consult Orders:    1. Palliative Care Adult IP Consult: assist with goals of care - suspected stroke, family deferred MRI; Consultant may enter orders: Yes; Patient to be seen: Routine - within 24 hours [[id number]] ordered by Donn Bentley MD at 05/30/17 0744                Palliative Care Consultation Note   Date of Service:5/31/2017  Admission Date/Time: 5/26/2017  Primary Care Provider: Devi May  Consulting Provider: Magui Carreno[KH1.1]    Dr Bentley[KH1.2], has asked us to  "see Angela Esposito to help with[KH1.1] goals of care[KH1.2].    I met with the patient and her[KH1.1] son, daughter and daughter in law[KH1.2].  Primary palliative care diagnosis:  .[KH1.1] Fall with history of unsteady gaits, suspected new subacute stroke[KH1.2]   Consulted with[KH1.1] hospitalist, RN[KH1.2]    SUMMARY/HPI:[KH1.1] Angela Esposito is a 89 year old woman admitted with multiple falls. She has longstanding history of unsteady gait, but with marked worsening in recent days. No clear lab abnormalities, no noted arrhythmia. Cause was suspected to be subacute stroke, but MRI was declined by the family given Angela's advanced age and goals of care being primarily comfort. This would not change the course of treatment.     She lives in assisted living and has also had a history of nighttime confusion and hallucinations. This is also worsening. Last night she was quite agitated, was given a dose of Zyprexa which was effective, but has been more sleepy today. She has intermittently complained of some mild headache and sore neck, relieved with acetaminophen. She previously had been ambulatory, now needing 2 people to transfer.[KH1.2]         ASSESSMENT, RECOMMENDATIONS, AND PLAN:  1.[KH1.1] Symptom Management    Pain, mild headache -- using acetaminophen. Suggest scheduling this at the NH    Agitation/delirium -- Zyprexa effective,  dose reduced to 1.25 mg[KH1.2]    2. Goals of Care[KH1.1]  -Angela has dementia which limits her ability to participate in goals of care conversation, but she does note that at her age, she has lived \"longer than I cared to.\"   -Her family are all in agreement that the goal of care is not life prolongation, but rather comfort, well being, safety. The are familiar with hospice and are requesting hospice services in the long term care facility (sounds like [facility] tomorrow). Referral has been placed.[KH1.2]       3. ACP    -Current code status is[KH1.1] " DNR/DNI[KH1.2]. Discussion of code status[KH1.1] -confirmed[KH1.2].  - She is[KH1.1] NOT[KH1.2] capable of making medical decisions on her own behalf.    4. Psychosocial/Emotional/Spiritual support[KH1.1]  . One son and one daughter both live locally, both very involved and supportive. She is one of 17 children. [place] farm. Recently living at [facility] [facility]. Michelle, enjoys phone calls from her . Nearly blind due to macular degeneration, and hard of hearing.[KH1.2]     5. Prognosis  -[KH1.1] [KH1.2] Vaibhav is medically appropriate for hospice if she pursues symptom focused care.     Palliative performance scale:[KH1.1] 30%  Ambulation-TOTALLY BEDBOUND; Activity-UNABLE TO DO ANY ACTIVITY; Evidence of Disease-EXTENSIVE DISEASE; Self care-TOTAL CARE; Intake-NORMAL or REDUCED; Conscious Level-FULL or DROWSY +/- CONFUSION[KH1.2]  -Palliative Performance Scale ( 0=death, 100= full function)   Domains of PPS = ambulation, activity level, evidence of disease, self-care, intake, level of consciousness.    Thank you for allowing me to participate in the care of Angela Esposito.  Palliative Care will continue to follow.      ALICE Bentley-[facility]  pager [phone]    REVIEW OF SYSTEMS:[KH1.1]   Pain: mild headache, relieved w/ acetaminophne. Stiff neck, also mild  Dyspnea: no  Nausea: no  Anorexia: no  Constipation: no  Delirium: yes. Hallucinations/deliusions sometimes very upsetting to her. Ongoing for years, worsening  Insomnia: no  Depression: no  Anxiety: no[KH1.2]    PAST MEDICAL HISTORY:[KH1.1]   Patient Active Problem List   Diagnosis     Disorder of bone and cartilage     Diverticulosis of large intestine     Essential hypertension     Polyneuropathy in other diseases classified elsewhere (H)     Personal history of benign neoplasm of brain     Generalized osteoarthrosis, unspecified site     Personal history of fall     Fear     DDD  "(degenerative disc disease), lumbar     Simple dental caries     Benign tumor of brain (H)     Sensorineural hearing loss, bilateral     Vertigo     CARDIOVASCULAR SCREENING; LDL GOAL LESS THAN 130     Lipid disorder     Macular degeneration     Retinal artery occlusion     Vision loss     Advanced directives, counseling/discussion     GERD (gastroesophageal reflux disease)     Pain in shoulder     At risk for falling     Glaucoma     Unsteady gait     Fall     Falls[KH1.3]       PAST SURGICAL HISTORY:[KH1.1]   Past Surgical History:   Procedure Laterality Date     C VAGINAL HYSTERECTOMY      Hysterectomy, Vaginal     CATARACT IOL, RT/LT      Cataract IOL RT     CATARACT IOL, RT/LT  7/98    Cataract IOL LT     CL AFF SURGICAL PATHOLOGY      leiomyoma  One ovary remains     COLONOSCOPY  2002    neg     SURGICAL HISTORY OF -   09/2004    Radiation Therapy Brain Tumor-Slow growing tumor and not malignant as per patient[KH1.3]       FAMILY HISTORY:  family history includes Breast Cancer in her sister; CANCER in her brother, father, mother, and sister; Prostate Cancer in her brother.  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 No     Social History Narrative[KH1.3]       ALLERGIES:   Allergies   Allergen Reactions     Sulfa Drugs Unknown     CURRENT MEDICATIONS:  No current outpatient prescriptions on file.     PHYSICAL EXAM:  /51 (BP Location: Left arm)  Pulse 59  Temp 98.3  F (36.8  C) (Oral)  Resp 16  Ht 5' 1\" (1.549 m)  Wt 126 lb 5.2 oz (57.3 kg)  SpO2 90%  BMI 23.87 " kg/m2  EXAM:[KH1.1]  EXAM:  Constitutional: alert pleasant no distress  NEURO: moves all extremities, speech is clear, oriented to self and 'hospital' but is confused about people and events[KH1.2]        LABS:  Albumin (g/dL)   Date Value   05/27/2017 3.4     No results found for: CREATININE  Urea Nitrogen (mg/dL)   Date Value   05/30/2017 17     Hemoglobin (g/dL)   Date Value   05/30/2017 13.4     Potassium (mmol/L)   Date Value   05/30/2017 3.9     No components found for: SODIUM      Total time spent on this visit:[KH1.1] 111[KH1.2] minutes[KH1.1]                 Revision History        User Key Date/Time User Provider Type Action    > KH1.3 5/31/2017  4:57 PM Magui Carreno APRN CNP Nurse Practitioner Sign     KH1.2 5/31/2017  4:44 PM Magui Carreno APRN CNP Nurse Practitioner      KH1.1 5/31/2017  4:38 PM Magui Carreno APRN CNP Nurse Practitioner             Consults by Stacey Chappell RN at 5/27/2017  2:19 PM     Author:  Stacey Chappell RN Service:  (none) Author Type:      Filed:  5/27/2017  3:20 PM Date of Service:  5/27/2017  2:19 PM Creation Time:  5/27/2017  2:08 PM    Status:  Addendum :  Stacey Chappell RN ()     Consult Orders:    1. Care Transition RN/SW IP Consult [[id number]] ordered by Donn Bentley MD at 05/27/17 1407                Care Transition Initial Assessment - RN  Reason For Consult: discharge planning   Met with: Patient., spoke to her son, Ernesto on the phone.  DATA  Active Problems:    Essential hypertension    Benign tumor of brain (H)    Macular degeneration    GERD (gastroesophageal reflux disease)    Glaucoma    Unsteady gait    Fall    Falls       Primary Care Clinic Name: [facility]  Primary Care MD Name: Devi May APRN CNP  Contact information and PCP information verified: Yes      ASSESSMENT  Cognitive Status: awake and alert.             Lives With: facility resident  Living Arrangements: assisted living                 Insurance  Concerns: No Insurance issues identified          This writer met with pt ,spoke to her son, Ernesto [phone] on the phone, introduced self and role.       PLAN    Patient resides at [facility] on [facility] [phone], fax [phone]. Currently her services include escorts to meals (she has been walking with walker), assistance with dressing and hearing aid. Patient's son states they will increase services to include medication administration and more frequent checks with bathroom assistance.[JD1.1] Message left at the [facility] nursing office requesting call back.[JD1.2]   HHC also to be added with [facility] ([phone] Fax: [phone]) as preferred service provider. Patient's son states they would like to bring her back to the [place] with increased services and they will be exploring LTC options at [facility].[JD1.1]     Addendum: received call back message from [facility] Nurse Earlene [phone] stating services for patient can be increased, the [facility] staff will meet with patient and her son upon her return to confirm plan.[JD1.3]         Stacey Chappell -[phone][JD1.1]         Revision History        User Key Date/Time User Provider Type Action    > JD1.3 5/27/2017  3:20 PM Stacey Chappell RN Case Manager Addend     JD1.2 5/27/2017  2:30 PM Stacey Chappell RN Case Manager Addend     JD1.1 5/27/2017  2:19 PM Stacey Chappell RN Case Manager Sign                     Progress Notes - Physician (Notes from 05/29/17 through 06/01/17)      Progress Notes by Eldia Soria PT at 5/31/2017  4:03 PM     Author:  Elida Soria PT Service:  (none) Author Type:  Physical Therapist    Filed:  5/31/2017  4:03 PM Date of Service:  5/31/2017  4:03 PM Creation Time:  5/31/2017  4:03 PM    Status:  Signed :  Elida Soria PT (Physical Therapist)            05/31/17 1500   Signing Clinician's Name / Credentials   Signing clinician's name / credentials ConnieSugg PT   Additional Documentation  "  Rehab Comments cancel- schedule, deciding on goals of care; will check on pt 6/1[CS1.1]        Revision History        User Key Date/Time User Provider Type Action    > CS1.1 5/31/2017  4:03 PM Elida Soria, PT Physical Therapist Sign            Progress Notes by Darin Bliss MD at 5/31/2017 11:44 AM     Author:  Darin Bliss MD Service:  (none) Author Type:  Physician    Filed:  5/31/2017  3:08 PM Date of Service:  5/31/2017 11:44 AM Creation Time:  5/31/2017 11:44 AM    Status:  Addendum :  Darin Bliss MD (Physician)         SUBJECTIVE:   Was able to get around her appt in [place] and get to BR, eat, get dressed  Now unable to walk without assist of 2  Very sleepy today from zyprexa  Has history of significant delusions, mostly at night for a while, some are tormenting.       ROS:4 point ROS including Respiratory, CV, GI and , other than that noted in the HPI,  is negative     OBJECTIVE:[MD1.1]   /56 (BP Location: Left arm)  Pulse 61  Temp 98.6  F (37  C) (Oral)  Resp 16  Ht 1.549 m (5' 1\")  Wt 57.3 kg (126 lb 5.2 oz)  SpO2 92%  BMI 23.87 kg/m2[MD1.2]    GENERAL APPEARANCE:  Sedated, will talk, some confusion      RESP:clear      CV: regular rate and rhythm,  No  murmur , edema: none       Abdomen: soft, nontender, no liver or spleen enlargement, no masses, BSs normal   Skin: no cyanosis, pallor, or jaundice  Neuro: Cranial nerves  are normal. DORON. EOM's intact. . Neck supple. No bruits. Normal deep tendon reflexes.   Normal strength, sensation and rapid alternating movements in the upper and lower extremities.    CMP[MD1.1]  Recent Labs  Lab 05/30/17  0640 05/28/17  0645 05/27/17  0900 05/26/17  2140    142 144 145*   POTASSIUM 3.9 3.8 3.8 4.0   CHLORIDE 110* 110* 110* 109   CO2 24 23 25 25   ANIONGAP 9 9 9 11   GLC 92 97 84 131*   BUN 17 10 10 15   CR 0.67 0.61 0.62 0.80   GFRESTIMATED 80 >90Non  GFR Calc 89 66   GFRESTBLACK >90African American GFR " Calc >90African American GFR Calc >90African American GFR Calc 80   MINOR 8.6 8.8 8.7 8.8   PROTTOTAL  --   --  6.5*  --    ALBUMIN  --   --  3.4  --    BILITOTAL  --   --  0.7  --    ALKPHOS  --   --  118  --    AST  --   --  20  --    ALT  --   --  16  --[MD1.2]      CBC[MD1.1]  Recent Labs  Lab 05/30/17  0640 05/28/17  0645 05/27/17  0900 05/26/17  2140   WBC 6.9 7.4 6.9 6.0   RBC 4.30 4.73 4.71 4.09   HGB 13.4 14.6 14.8 12.6   HCT 40.7 44.8 44.7 39.2   MCV 95 95 95 96   MCH 31.2 30.9 31.4 30.8   MCHC 32.9 32.6 33.1 32.1   RDW 14.0 13.8 13.7 13.8    231 220 211[MD1.2]     INR[MD1.1]No lab results found in last 7 days.[MD1.2]  Arterial BloodGas[MD1.1]  No lab results found in last 7 days.[MD1.2]   Venous Blood Gas[MD1.1]  No lab results found in last 7 days.[MD1.2]    Medications[MD1.1]     OLANZapine  1.25 mg Oral Daily at 8 pm     latanoprost  1 drop Right Eye At Bedtime     prednisoLONE acetate  1 drop Right Eye Q3 Days     losartan  50 mg Oral Daily     omeprazole  20 mg Oral Daily     enoxaparin  30 mg Subcutaneous Q24H[MD1.3]       Intake/Output Summary (Last 24 hours) at 05/31/17 1145  Last data filed at 05/31/17 0930   Gross per 24 hour   Intake              120 ml   Output                0 ml   Net              120 ml         ASSESSMENT: PLAN:   Falls with longstanding history of Unsteady gait - suspect subacute stroke  5/27/2017 -- long-standing problem but appears acutely worse past few days. No clear lab abnormalities on admission.  Evaluating chest pain as below, but doubt MI or arrythmia as cause but will place on telemetry overnight.  PE possibility but again unlikely.  Doubt seizures as discussed below.  Has a history of vertigo in the past and reports continued dizziness with ambulation - will try some meclizine as needed, physical therapy and occupational therapy consulted.  May need increased services from current memory care.    5/28/2017 -- CT negative for PE, UA and chest x-ray  unremarkable.  Continue with physical therapy and with meclizine as needed.  May need placement as below.    5/29/2017 -- no improvement - suspect CVA - discussed with patient and family - could do MRI tomorrow to confirm, but regardless this does not appear reversible.  May improve but a fair chance that it does not.  They would like to hold off on MRI and pursue TCU in the next couple of days.  If they change their mind will order MRI for tomorrow.   5/30/2017 -- plan remains to forego MRI and manage conservatively.  Patient would still like to return to assisted living if able - appears a bit better today - will have physical therapy see her today, discuss options with family and have palliative care consult this afternoon.  Most likely plan from what patient and family have said so far would be TCU tomorrow with hope for return to assisted living but more likely outcome being needing long-term care, however discharge directly to hospice would also be very reasonable here.[MD1.1]    5/31/2017[MD1.4] was able to walk and get to BR and food from [place] and now can't do much of anything without assist.  No toxic, metabolic or infectious cause found.  CNS event suspected as above.  Family interested in LTC with hospice    Alzheimer's disease with sundowning.   -has been going on for a while but now worse last adelia.  Can be tormenting dilusions.  Will try some low dose zyprexa at 8 PM.  .        Chest pain  5/27/2017 -- unclear timing on this - suspect most likely musculoskeletal but with increased falls without clear cause will check troponin and ECG to help with determining possible etiology and with prognosis although no intervention planned beyond likely starting aspirin if either shows clear acute change, otherwise in context of falls will hold off on adding aspirin for now.  Also checking d-dimer as above.   Giving GI cocktail, checking chest x-ray.    5/28/2017 -- resolved and has not recurred - unclear if GI  cocktail helped or not.  Does not look cardiac. No further evaluation at this point.    5/30/2017 -- no further issues.          GERD (gastroesophageal reflux disease) with hiatal hernia   5/27/2017 -- continuing home omeprazole, giving GI cocktail x 1 as patient thinks this could be causing her chest pain as above.    5/28/2017 -- no symptoms today.  Continue omeprazole.  Hiatal hernia seen on CT.    5/30/2017 -- no further issues.        Essential hypertension   5/28/2017 -- blood pressure fluctauting but always high and sometimes markedly elevated -  increase losartan to 50 from 25 mg   5/30/2017 -- doing well since increasing cozaar.       History of Benign tumor of brain (H)  5/27/2017 -- no new changes seen on head CT.  She was apparently on keppra in the past for seizure prophylaxis, but isn't currently.  Seizures are a possible cause of her falls, but doesn't sounds clearly like that.  given no new changes on imaging and no symptoms clearly pointing to seizures will not restart this at present.          Macular degeneration/glaucoma  5/27/2017 -- patient is legally blind - staff to announce and identify themselves on entering room.  Awaiting dosing of latanoprost and prednisolone eye drops.   5/29/2017 -- restarted home drops.        Lung nodules  5/28/2017 -- seen on chest CT - patient denies smoking in the past but unclear - if she had recommendation would be for repeat imaging in 12 months -regardless in her case I doubt this would be of any benefit.        Prophylaxis  on lovenox     Disposition  Physical therapy and palliative care to see today - likely LTC with hospice.         [MD1.1]     Revision History        User Key Date/Time User Provider Type Action    > [N/A] 5/31/2017  3:08 PM Darin Bliss MD Physician Addend     MD1.3 5/31/2017 11:49 AM Darin Bliss MD Physician Sign     MD1.4 5/31/2017 11:46 AM Darin Bliss MD Physician      MD1.2 5/31/2017 11:45 AM Darin Bliss MD  "Physician      MD1.1 5/31/2017 11:44 AM Darin Bliss MD Physician             Progress Notes by Swapna Arita LICSW at 5/31/2017  2:58 PM     Author:  Swapna Arita LICSW Service:  (none) Author Type:      Filed:  5/31/2017  2:59 PM Date of Service:  5/31/2017  2:58 PM Creation Time:  5/31/2017  2:58 PM    Status:  Signed :  Swapna Arita LICSW ()         Reason for Follow up: DC Planning    Anticipated discharge needs: Discussed with pts dtr and son dc planning. All in agreement for LTC placement, [facility] has NO beds available. [facility] (Phone: [phone]) Fax: ([phone]) does have a bed available in the LTC MCU. All in agreement for this. Palliative care consult this afternoon to further explore goals of care.     Next steps: DC to SNF tomorrow?    Swapna Arita [facility] [place], [place], [facility] [phone][AK1.1]           Revision History        User Key Date/Time User Provider Type Action    > AK1.1 5/31/2017  2:59 PM Swapna Arita LICSW  Sign            Progress Notes by Johanson, Cindy, RN at 5/30/2017  8:30 PM     Author:  Johanson, Cindy, RN Service:  ICU Author Type:  Registered Nurse    Filed:  5/30/2017  8:43 PM Date of Service:  5/30/2017  8:30 PM Creation Time:  5/30/2017  8:35 PM    Status:  Signed :  Johanson, Cindy, RN (Registered Nurse)         Had fallen asleep earlier so Zyprexa was not given.  Patient did awakened now, complains of neck pain and pain to hands.  She states earlier staff was \"rough with her and she hurt her neck\".  Did medicate with the Zyprexa now and also Tylenol 650 mg po now for such.  States was dizzy when this writer raised head of bed but is better and she stated it passed with keeping eyes closed for awhile.  Is now sitting upright in bed and denies dizziness when questioned.  Will continue to monitor mentation and activity tolerance.  Offered walk to rest room but declined twice during conversation.  " "Medications given with pudding.[CJ1.1]       Revision History        User Key Date/Time User Provider Type Action    > CJ1.1 5/30/2017  8:43 PM Johanson, Cindy, RN Registered Nurse Sign            Progress Notes by Salinas Johns MD at 5/30/2017  5:53 PM     Author:  Salinas Johns MD Service:  Hospitalist Author Type:  Physician    Filed:  5/30/2017  5:54 PM Date of Service:  5/30/2017  5:53 PM Creation Time:  5/30/2017  5:53 PM    Status:  Signed :  Salinas Johns MD (Physician)         Cross cover  5:53 PM   Pt agitated.  Thinks there are sex parties going on.  Angry, threatening.  No iv in  Try zyprexa 5 mg odt times one.[JE1.1]     Revision History        User Key Date/Time User Provider Type Action    > JE1.1 5/30/2017  5:54 PM Salinas Johns MD Physician Sign            Progress Notes by Deedee Khalil RN at 5/30/2017  5:28 PM     Author:  Deedee Khalil RN Service:  (none) Author Type:  Registered Nurse    Filed:  5/30/2017  5:31 PM Date of Service:  5/30/2017  5:28 PM Creation Time:  5/30/2017  5:28 PM    Status:  Signed :  Deedee Khalil RN (Registered Nurse)         Pt confused this evening, thinking she was in her own apartment, telling staff to leave her room, that she did not want any help with dinner and that she was \" tired of all the sex parties going on around this place.\"  Attempted to reorient, will monitor.[KJ1.1]     Revision History        User Key Date/Time User Provider Type Action    > KJ1.1 5/30/2017  5:31 PM Deedee Khalil RN Registered Nurse Sign            Progress Notes by Swapna Arita LICSW at 5/30/2017  1:39 PM     Author:  Swapna Arita LICSW Service:  (none) Author Type:      Filed:  5/30/2017  1:39 PM Date of Service:  5/30/2017  1:39 PM Creation Time:  5/30/2017  1:35 PM    Status:  Signed :  Swapna Arita LICSW ()         Reason for Follow up: DC Planning    Anticipated discharge " needs: This writer met with pt and pts son. Discussed at length return to long-term vs TCU vs LTC. At this time family is unsure about how well she would do in a TCU. Discussed long term care, which they are very interested in, preferably at [facility] (Phone: [phone]) Fax: ([phone]) or [facility] (Phone: [phone]) Fax: ([phone]). Referrals pending at both. Discussed private pay and they are in agreement for that. This writer also spoke with the family about pts return back to her apt IF, LTC has no openings, and they are agreeable to paying someone 24 hours for help at home. First choice is LTC at a SNF. Palliative consult this afternoon. CTS will cont to follow.     Next steps: Palliative consult pending, waiting on LTC referrals    Swapna Arita MS, [facility], [facility] [phone][AK1.1]           Revision History        User Key Date/Time User Provider Type Action    > AK1.1 5/30/2017  1:39 PM Swapna Arita, [place]  Sign            Progress Notes by Donn Bentley MD at 5/30/2017  8:46 AM     Author:  Donn Bentley MD Service:  Hospitalist Author Type:  Physician    Filed:  5/30/2017  9:05 AM Date of Service:  5/30/2017  8:46 AM Creation Time:  5/30/2017  8:46 AM    Status:  Signed :  Donn Bentley MD (Physician)         [facility]           Assessment and Plan:     Falls with longstanding history of Unsteady gait[KK1.1] - suspect subacute stroke[KK1.2]  5/27/2017 -- long-standing problem but appears acutely worse past few days. No clear lab abnormalities on admission.  Evaluating chest pain as below, but doubt MI or arrythmia as cause but will place on telemetry overnight.  PE possibility but again unlikely.  Doubt seizures as discussed below.  Has a history of vertigo in the past and reports continued dizziness with ambulation - will try some meclizine as needed, physical therapy and occupational therapy consulted.  May  need increased services from current memory care.    5/28/2017 -- CT negative for PE, UA and chest x-ray unremarkable.  Continue with physical therapy and with meclizine as needed.  May need placement as below.    5/29/2017 -- no improvement - suspect CVA - discussed with patient and family - could do MRI tomorrow to confirm, but regardless this does not appear reversible.  May improve but a fair chance that it does not.  They would like to hold off on MRI and pursue TCU in the next couple of days.  If they change their mind will order MRI for tomorrow.   5/30/2017 --[KK1.1] plan remains to forego MRI and manage conservatively.  Patient would still like to return to assisted living if able - appears a bit better today - will have physical therapy see her today, discuss options with family and have palliative care consult this afternoon.  Most likely plan from what patient and family have said so far would be TCU tomorrow with hope for return to assisted living but more likely outcome being needing long-term care, however discharge directly to hospice would also be very reasonable here.[KK1.2]        Chest pain  5/27/2017 -- unclear timing on this - suspect most likely musculoskeletal but with increased falls without clear cause will check troponin and ECG to help with determining possible etiology and with prognosis although no intervention planned beyond likely starting aspirin if either shows clear acute change, otherwise in context of falls will hold off on adding aspirin for now.  Also checking d-dimer as above.   Giving GI cocktail, checking chest x-ray.    5/28/2017 -- resolved and has not recurred - unclear if GI cocktail helped or not.  Does not look cardiac. No further evaluation at this point.    5/[KK1.1]30[KK1.2]/2017 -- no further issues.         GERD (gastroesophageal reflux disease) with hiatal hernia   5/27/2017 -- continuing home omeprazole, giving GI cocktail x 1 as patient thinks this could be  causing her chest pain as above.    5/28/2017 -- no symptoms today.  Continue omeprazole.  Hiatal hernia seen on CT.    5/[KK1.1]30[KK1.2]/2017 -- no further issues.       Essential hypertension   5/28/2017 -- blood pressure fluctauting but always high and sometimes markedly elevated -  increase losartan to 50 from 25 mg[KK1.1]   5/30/2017 -- doing well since increasing cozaar.[KK1.2]       History of Benign tumor of brain (H)  5/27/2017 -- no new changes seen on head CT.  She was apparently on keppra in the past for seizure prophylaxis, but isn't currently.  Seizures are a possible cause of her falls, but doesn't sounds clearly like that.  given no new changes on imaging and no symptoms clearly pointing to seizures will not restart this at present.          Macular degeneration/glaucoma  5/27/2017 -- patient is legally blind - staff to announce and identify themselves on entering room.  Awaiting dosing of latanoprost and prednisolone eye drops.   5/29/2017 -- restarted home drops.        Lung nodules  5/28/2017 -- seen on chest CT - patient denies smoking in the past[KK1.1] but unclear[KK1.2] - if she had recommendation would be for repeat imaging in 12 months -[KK1.1]regardless[KK1.2] in her case I doubt this would be of any benefi[KK1.1]t[KK1.2].        Prophylaxis  on lovenox    Disposition[KK1.1]  Physical therapy and palliative care to see today - likely TCU tomorrow with goal of returning to assisted living if improves but patient and family aware that this is unlikely.[KK1.2]              Interval History:[KK1.1]   No new concerns.  Patient has a bit more energy this AM.  No pain.  No dyspnea, no fever or chills.  Thinks she's a bit stronger today but hasn't been up without lift yet.[KK1.2]             Review of Systems:[KK1.1]    ROS: 10 point ROS neg other than the symptoms noted above in the HPI.[KK1.2]             Medications:   Current active medications and PTA medications reviewed, see medication list  for details.            Physical Exam:   Vitals were reviewed  Patient Vitals for the past 24 hrs:   BP Temp Temp src Pulse Heart Rate Resp SpO2   17 0805 156/63 97.7  F (36.5  C) Oral 54 - 16 93 %   17 2300 164/56 97.5  F (36.4  C) Oral - 58 16 93 %   17 1902 147/54 97.9  F (36.6  C) Oral - 69 18 93 %   17 1530 152/48 98.4  F (36.9  C) Oral - 57 16 95 %   17 1230 156/54 96.7  F (35.9  C) Oral - 72 - 95 %   17 0907 143/56 - - - 60 - -       Temperatures:  Current - Temp: 97.7  F (36.5  C); Max - Temp  Av.6  F (36.4  C)  Min: 96.7  F (35.9  C)  Max: 98.4  F (36.9  C)  Respiration range: Resp  Av.5  Min: 16  Max: 18  Pulse range: Pulse  Av  Min: 54  Max: 54  Blood pressure range: Systolic (24hrs), Av , Min:143 , Max:164   ; Diastolic (24hrs), Av, Min:48, Max:63    Pulse oximetry range: SpO2  Av.8 %  Min: 93 %  Max: 95 %  I/O last 3 completed shifts:  In: 320 [P.O.:320]  Out: -     Intake/Output Summary (Last 24 hours) at 17 0846  Last data filed at 17 1844   Gross per 24 hour   Intake              320 ml   Output                0 ml   Net              320 ml[KK1.1]     EXAM:  General: awake and alert, NAD, oriented x 3  Head: normocephalic  Neck: unremarkable, no lymphadenopathy   HEENT: oropharynx pink and moist    Heart: Regular rate and rhythm, no murmurs, rubs, or gallops  Lungs: clear to auscultation bilaterally with good air movement throughout  Abdomen: soft, non-tender, no masses or organomegaly  Extremities: no edema in lower extremities   Skin unremarkable.[KK1.2]               Data:[KK1.1]     Results for orders placed or performed during the hospital encounter of 17 (from the past 24 hour(s))   CBC with platelets differential   Result Value Ref Range    WBC 6.9 4.0 - 11.0 10e9/L    RBC Count 4.30 3.8 - 5.2 10e12/L    Hemoglobin 13.4 11.7 - 15.7 g/dL    Hematocrit 40.7 35.0 - 47.0 %    MCV 95 78 - 100 fl    MCH 31.2 26.5 -  33.0 pg    MCHC 32.9 31.5 - 36.5 g/dL    RDW 14.0 10.0 - 15.0 %    Platelet Count 224 150 - 450 10e9/L    Diff Method Automated Method     % Neutrophils 58.4 %    % Lymphocytes 25.4 %    % Monocytes 11.5 %    % Eosinophils 3.8 %    % Basophils 0.6 %    % Immature Granulocytes 0.3 %    Absolute Neutrophil 4.1 1.6 - 8.3 10e9/L    Absolute Lymphocytes 1.8 0.8 - 5.3 10e9/L    Absolute Monocytes 0.8 0.0 - 1.3 10e9/L    Absolute Eosinophils 0.3 0.0 - 0.7 10e9/L    Absolute Basophils 0.0 0.0 - 0.2 10e9/L    Abs Immature Granulocytes 0.0 0 - 0.4 10e9/L   Basic metabolic panel   Result Value Ref Range    Sodium 143 133 - 144 mmol/L    Potassium 3.9 3.4 - 5.3 mmol/L    Chloride 110 (H) 94 - 109 mmol/L    Carbon Dioxide 24 20 - 32 mmol/L    Anion Gap 9 3 - 14 mmol/L    Glucose 92 70 - 99 mg/dL    Urea Nitrogen 17 7 - 30 mg/dL    Creatinine 0.67 0.52 - 1.04 mg/dL    GFR Estimate 80 >60 mL/min/1.7m2    GFR Estimate If Black >90   GFR Calc   >60 mL/min/1.7m2    Calcium 8.6 8.5 - 10.1 mg/dL[KK1.3]           Attestation:[KK1.1]  I have reviewed today's vital signs, notes, medications, labs and imaging.  Amount of time performed on this daily note: 25 minutes.[KK1.2]     Donn Bentley MD, MD[KK1.1]                          Revision History        User Key Date/Time User Provider Type Action    > KK1.3 5/30/2017  9:05 AM Donn Bentley MD Physician Sign     KK1.2 5/30/2017  8:59 AM Donn Bentley MD Physician      KK1.1 5/30/2017  8:46 AM Donn Bentley MD Physician             Progress Notes by Samantha Sr PT at 5/29/2017  5:09 PM     Author:  Remberto, Samantha L, PT Service:  Physical Medicine and Rehabilitation Author Type:  Physical Therapist    Filed:  5/29/2017  5:09 PM Date of Service:  5/29/2017  5:09 PM Creation Time:  5/29/2017  5:09 PM    Status:  Signed :  Samantha Sr, PT (Physical Therapist)            05/29/17 3918   Signing Clinician's Name / Credentials   Signing clinician's name  "/ credentials Samantha Sr PT, DPT JOSUÉ SHELL/t   Quick Adds   Rehab Discipline PT   Therapeutic Exercise   Minutes of Treatment 15   Treatment Detail Demonstrated no carry over and max assist and bed to come to sitting position.  Continues with confusion and less abiity to follow commands.  Less motivation and requests more assistance and lack of initiang mobiity or movement.   Additional Documentation   Rehab Comments Declining with skilled intervention.  Will discuss further at rounds.  Will always require 1:1 with exercises and lacks desire and balance to promote gait   PT Plan Work on sitting balance and righting response to assess change.s  Standing and gait maybe to much of challenge and patient has given up motivation to attempt.   [facility] AM-PAC  \"6 Clicks\" V.2 Basic Mobility Inpatient Short Form   1. Turning from your back to your side while in a flat bed without using bedrails? 2 - A Lot   2. Moving from lying on your back to sitting on the side of a flat bed without using bedrails? 2 - A Lot   3. Moving to and from a bed to a chair (including a wheelchair)? 1 - Total   4. Standing up from a chair using your arms (e.g., wheelchair, or bedside chair)? 1 - Total   5. To walk in hospital room? 1 - Total   6. Climbing 3-5 steps with a railing? 1 - Total   Basic Mobility Raw Score (Score out of 24.Lower scores equate to lower levels of function) 8   Total Session Time   Total Session Time (minutes) 15 minutes[KB1.1]        Revision History        User Key Date/Time User Provider Type Action    > KB1.1 5/29/2017  5:09 PM Samantha Sr PT Physical Therapist Sign            Progress Notes by Donn Bentley MD at 5/29/2017  3:50 PM     Author:  Donn Bentley MD Service:  Hospitalist Author Type:  Physician    Filed:  5/29/2017  4:35 PM Date of Service:  5/29/2017  3:50 PM Creation Time:  5/29/2017  3:50 PM    Status:  Signed :  Donn Bentley MD (Physician)         [facility] " Hospitalist Progress Note           Assessment and Plan:     Falls with longstanding history of   Unsteady gait  5/27/2017 -- long-standing problem but appears acutely worse past few days. No clear lab abnormalities on admission.  Evaluating chest pain as below, but doubt MI or arrythmia as cause but will place on telemetry overnight.  PE possibility but again unlikely.  Doubt seizures as discussed below.  Has a history of vertigo in the past and reports continued dizziness with ambulation - will try some meclizine as needed, physical therapy and occupational therapy consulted.  May need increased services from current memory care.    5/28/2017 -- CT negative for PE, UA and chest x-ray unremarkable.  Continue with physical therapy and with meclizine as needed.  May need placement as below.[KK1.1]    5/29/2017 -- no improvement - suspect CVA - discussed with patient and family - could do MRI tomorrow to confirm, but regardless this does not appear reversible.  May improve but a fair chance that it does not.  They would like to hold off on MRI and pursue TCU in the next couple of days.  If they change their mind will order MRI for tomorrow.[KK1.2]       Chest pain  5/27/2017 -- unclear timing on this - suspect most likely musculoskeletal but with increased falls without clear cause will check troponin and ECG to help with determining possible etiology and with prognosis although no intervention planned beyond likely starting aspirin if either shows clear acute change, otherwise in context of falls will hold off on adding aspirin for now.  Also checking d-dimer as above.   Giving GI cocktail, checking chest x-ray.    5/28/2017 -- resolved and has not recurred - unclear if GI cocktail helped or not.  Does not look cardiac. No further evaluation at this point.[KK1.1]    5/29/2017 -- no further issues.[KK1.2]         GERD (gastroesophageal reflux disease) with hiatal hernia   5/27/2017 -- continuing home omeprazole,  giving GI cocktail x 1 as patient thinks this could be causing her chest pain as above.    5/28/2017 -- no symptoms today.  Continue omeprazole.  Hiatal hernia seen on CT.[KK1.1]    5/29/2017 -- no further issues.[KK1.2]       Essential hypertension  5/27/2017 -- blood pressure up, has been like this in the past and actually a bit higher when in pain.  Just given AM Cozaar - recheck in 1 hour.    5/28/2017 -- blood pressure fluctauting but always high and sometimes markedly elevated - will increase losartan to 50 from 25 mg and follow.  Asymptomatic.[KK1.1]    5/29/2017 -- improved with increased cozaar.[KK1.2]         History of Benign tumor of brain (H)  5/27/2017 -- no new changes seen on head CT.  She was apparently on keppra in the past for seizure prophylaxis, but isn't currently.  Seizures are a possible cause of her falls, but doesn't sounds clearly like that.  given no new changes on imaging and no symptoms clearly pointing to seizures will not restart this at present.          Macular degeneration/glaucoma  5/27/2017 -- patient is legally blind - staff to announce and identify themselves on entering room.  Awaiting dosing of latanoprost and prednisolone eye drops.[KK1.1]   5/29/2017 -- restarted home drops.[KK1.2]        Lung nodules  5/28/2017 -- seen on chest CT - patient denies smoking in the past - if she had recommendation would be for repeat imaging in 12 months -however in her case I doubt this would be of any benefit.       Prophylaxis  on lovenox      Lines  PIV    Disposition[KK1.1]  Likely TCU on discharge with plan to consider long-term care/hospice if not improved but would forest to try to improve and see if she can get back to current living situation.[KK1.2]              Interval History:[KK1.1]   No new concerns, but continues to have trouble with balance and leaning to the right.  If anything strength and this balance trouble is getting worse as opposed to better. Always to the same side, no  "acute changes in symptoms. No pain, no dyspnea, no fever or chills.  No new concerns, patient reports feeling \"pretty good\" but needing assist of 2.[KK1.2]            Review of Systems:[KK1.1]    ROS: 10 point ROS neg other than the symptoms noted above in the HPI.[KK1.2]           Medications:   Current active medications and PTA medications reviewed, see medication list for details.            Physical Exam:   Vitals were reviewed  Patient Vitals for the past 24 hrs:   BP Temp Temp src Heart Rate Resp SpO2   17 1530 152/48 98.4  F (36.9  C) Oral 57 16 95 %   17 1230 156/54 96.7  F (35.9  C) Oral 72 - 95 %   17 0907 143/56 - - 60 - -   17 0700 (!) 209/66 - Axillary 63 18 98 %   17 1600 196/71 - - 57 - -   17 1554 (!) 204/70 - - - - -       Temperatures:  Current - Temp: 98.4  F (36.9  C); Max - Temp  Av.6  F (36.4  C)  Min: 96.7  F (35.9  C)  Max: 98.4  F (36.9  C)  Respiration range: Resp  Av  Min: 16  Max: 18  Pulse range: No Data Recorded  Blood pressure range: Systolic (24hrs), Av , Min:143 , Max:209   ; Diastolic (24hrs), Av, Min:48, Max:71    Pulse oximetry range: SpO2  Av %  Min: 95 %  Max: 98 %  I/O last 3 completed shifts:  In: 120 [P.O.:120]  Out: -     Intake/Output Summary (Last 24 hours) at 17 1550  Last data filed at 17 1200   Gross per 24 hour   Intake              120 ml   Output                0 ml   Net              120 ml[KK1.1]     EXAM:  General: awake and alert, NAD, oriented x 3  Head: normocephalic  Neck: unremarkable, no lymphadenopathy   HEENT: oropharynx pink and moist    Heart: Regular rate and rhythm, no murmurs, rubs, or gallops  Lungs: clear to auscultation bilaterally with good air movement throughout  Abdomen: soft, non-tender, no masses or organomegaly  Extremities: no edema in lower extremities   Skin unremarkable.     Neuro: complicated by exteremly poor vision but CN appear intact, no dramatically pronounced " weakness, although right side in upper extremities and lower extremities may be slightly more weak throughout.[KK1.2]          Data:[KK1.1]   No results found for this or any previous visit (from the past 24 hour(s)).[KK1.3]        Attestation:[KK1.1]  I have reviewed today's vital signs, notes, medications, labs and imaging.  Amount of time performed on this discharge summary: 30 minutes.[KK1.2]     Donn Bentley MD, MD[KK1.1]                          Revision History        User Key Date/Time User Provider Type Action    > KK1.3 5/29/2017  4:35 PM Donn Bentley MD Physician Sign     KK1.2 5/29/2017  4:24 PM Donn Bentley MD Physician      KK1.1 5/29/2017  3:50 PM Donn Bentley MD Physician             Progress Notes by Earlene Sigala RN at 5/29/2017  2:13 PM     Author:  Earlene Sigala RN Service:  (none) Author Type:  Registered Nurse    Filed:  5/29/2017  3:48 PM Date of Service:  5/29/2017  2:13 PM Creation Time:  5/29/2017  2:13 PM    Status:  Addendum :  Earlene Sigala RN (Registered Nurse)         Attempted to put patient back to bed with EZ stand and 2 staff.  Patient unable to  EZ stand.  Transferred to room 2401 for lift room.  Family present and aware.  Did FYI Dr. Bentley at this time regarding increased weakness/leaning to the[LL1.1] right[LL1.2]     Revision History        User Key Date/Time User Provider Type Action    > LL1.2 5/29/2017  3:48 PM Earlene Sigala, RN Registered Nurse Addend     LL1.1 5/29/2017  2:14 PM Earlene Sigala RN Registered Nurse Sign            Progress Notes by Samantha Sr PT at 5/29/2017  1:42 PM     Author:  Samantha Sr PT Service:  Physical Medicine and Rehabilitation Author Type:  Physical Therapist    Filed:  5/29/2017  1:42 PM Date of Service:  5/29/2017  1:42 PM Creation Time:  5/29/2017  1:42 PM    Status:  Signed :  Samantha Sr PT (Physical Therapist)            05/28/17 6389   Signing Clinician's Name / Credentials  "  Signing clinician's name / credentials Samantha Sr PT, DPT CEANKIT, STAR/t   Quick Adds   Rehab Discipline PT   Sit-Stand Transfer Training   Minutes of Treatment (Sit-To-Stand Transfer Training) 25   Symptoms Noted During/After Treatment (Sit-To-Stand Transfer Training) dizziness   Sit-To-Stand Transfer [place] Level (Training) maximum assist (25% patient effort)   Sit-To-Stand Transfer Physical Assistance Level (Training) 1 person assist;verbal cues;supervision;set-up required;nonverbal cues (demo/gestures)   Sit-To-Stand Transfer Weight Bearing (Training) full weight-bearing   Sit-To-Stand Transfer Assist Device (Training) rolling walker   Stand-To-Sit Transfer [place] Level (Training) maximum assist (25% patient effort)   Stand-To-Sit Transfer Physical Assistance Level (Training) 1 person assist   Stand-To-Sit Transfer Weight Bearing (Training) full weight-bearing   Stand-To-Sit Transfer Assist Device (Training) rolling walker   Transfer Safety Analysis Concerns (Sit-To-Stand Training) decreased sequencing ability;decreased proprioception;inability to maintain weight-bearing restrictions without assist;decreased weight-shifting ability   Transfer Safety Analysis Impairments (Sit-To-Stand Training) impaired postural control;impaired motor control;impaired coordination;decreased strength   Therapeutic Exercise   Minutes of Treatment 15   Symptoms Noted During/After Treatment fatigue   Treatment Detail Trial of sitting righting response but patient is unable to respond approrpaitely of safely.  Unable to mainatail up right posture and does not feel she is not upright.  Leans to right and once corrected unable to mainatain.  [place] of gait and unable to sequence to pick feet up or turn   [facility] AM-PAC  \"6 Clicks\" V.2 Basic Mobility Inpatient Short Form   1. Turning from your back to your side while in a flat bed without using bedrails? 2 - A Lot   2. Moving from lying on your back to sitting on " the side of a flat bed without using bedrails? 2 - A Lot   3. Moving to and from a bed to a chair (including a wheelchair)? 2 - A Lot   4. Standing up from a chair using your arms (e.g., wheelchair, or bedside chair)? 2 - A Lot   5. To walk in hospital room? 1 - Total   6. Climbing 3-5 steps with a railing? 1 - Total   Basic Mobility Raw Score (Score out of 24.Lower scores equate to lower levels of function) 10   Total Session Time   Total Session Time (minutes) 40 minutes[KB1.1]        Revision History        User Key Date/Time User Provider Type Action    > KB1.1 5/29/2017  1:42 PM Samantha Sr PT Physical Therapist Sign            Progress Notes by Samantha Sr PT at 5/29/2017  1:38 PM     Author:  Samantha Sr PT Service:  Physical Medicine and Rehabilitation Author Type:  Physical Therapist    Filed:  5/29/2017  1:38 PM Date of Service:  5/29/2017  1:38 PM Creation Time:  5/29/2017  1:38 PM    Status:  Signed :  Samantha Sr PT (Physical Therapist)            05/27/17 1100   Signing Clinician's Name / Credentials   Signing clinician's name / credentials Samantha Sr PT, DPT CEEAA   Quick Adds   Rehab Discipline PT   Sit-Stand Transfer Training   Minutes of Treatment (Sit-To-Stand Transfer Training) 23   Symptoms Noted During/After Treatment (Sit-To-Stand Transfer Training) dizziness;fatigue;significant change in vital signs   Sit-To-Stand Transfer [place] Level (Training) moderate assist (50% patient effort)   Sit-To-Stand Transfer Physical Assistance Level (Training) set-up required;supervision;verbal cues;nonverbal cues (demo/gestures);1 person assist   Sit-To-Stand Transfer Weight Bearing (Training) full weight-bearing   Sit-To-Stand Transfer Assist Device (Training) rolling walker   Stand-To-Sit Transfer [place] Level (Training) moderate assist (50% patient effort)   Stand-To-Sit Transfer Physical Assistance Level (Training) set-up required;supervision;verbal  "cues;nonverbal cues (demo/gestures);1 person assist   Stand-To-Sit Transfer Weight Bearing (Training) full weight-bearing   Stand-To-Sit Transfer Assist Device (Training) rolling walker   Transfer Safety Analysis Concerns (Sit-To-Stand Training) losing balance backward;decreased balance during turns;decreased sequencing ability;inability to maintain weight-bearing restrictions without assist   Transfer Safety Analysis Impairments (Sit-To-Stand Training) impaired balance;impaired coordination;impaired motor control;impaired postural control;decreased ROM;decreased strength   Additional Documentation   Rehab Comments Progress to more ait activites   PT Plan Increased HR and and systolic pressure with activity.  Increased report of 'dizziness\"  Maybe more cervical [facility]    [facility] AM-PAC  \"6 Clicks\" V.2 Basic Mobility Inpatient Short Form   1. Turning from your back to your side while in a flat bed without using bedrails? 2 - A Lot   2. Moving from lying on your back to sitting on the side of a flat bed without using bedrails? 2 - A Lot   3. Moving to and from a bed to a chair (including a wheelchair)? 2 - A Lot   4. Standing up from a chair using your arms (e.g., wheelchair, or bedside chair)? 2 - A Lot   5. To walk in hospital room? 1 - Total   6. Climbing 3-5 steps with a railing? 1 - Total   Basic Mobility Raw Score (Score out of 24.Lower scores equate to lower levels of function) 10   Total Session Time   Total Session Time (minutes) 23 minutes[KB1.1]        Revision History        User Key Date/Time User Provider Type Action    > KB1.1 5/29/2017  1:38 PM Samantha Sr, PT Physical Therapist Sign            Progress Notes by Samantha Sr PT at 5/29/2017  1:35 PM     Author:  Samantha Sr, PT Service:  Physical Medicine and Rehabilitation Author Type:  Physical Therapist    Filed:  5/29/2017  1:35 PM Date of Service:  5/29/2017  1:35 PM Creation Time:  5/29/2017  1:35 PM    Status:  Signed " :  Samantha Sr, PT (Physical Therapist)            05/27/17 1100   Quick Adds   Type of Visit Initial PT Evaluation   Living Environment   Lives With facility resident   Living Arrangements assisted living   Home Accessibility no concerns   Living Environment Comment Falls in the bathroom.  Split water on the floor and fell bathrrom backwards when she slipped on the water.   Self-Care   Dominant Hand right   Usual Activity Tolerance fair   Current Activity Tolerance fair   Equipment Currently Used at Home grab bar;raised toilet;shower chair;walker, rolling   Functional Level Prior   Ambulation 1-->assistive equipment   Transferring 1-->assistive equipment   Toileting 1-->assistive equipment   Bathing 1-->assistive equipment   Dressing 0-->independent   Eating 0-->independent   Communication 0-->understands/communicates without difficulty   Swallowing 0-->swallows foods/liquids without difficulty   Fall history within last six months yes   Number of times patient has fallen within last six months 5   Prior Functional Level Comment Falls backwards.   General Information   Onset of Illness/Injury or Date of Surgery - Date 05/26/17   Cognitive Status Examination   Orientation not oriented to person, place or time   Level of Consciousness confused;alert   Follows Commands and Answers Questions 50% of the time   Personal Safety and Judgment impaired;at risk behaviors demonstrated;impulsive   Memory impaired   Cognitive Comment Varied levels of accuracy with orientation and memory throughout treament   Pain Assessment   Patient Currently in Pain No   Posture    Posture Forward head position;Protracted shoulders;Kyphosis   Range of Motion (ROM)   ROM Comment Functiaonl to shoulder ht with arms and ADLS with level of assitance she requires.  Lower extremity hip flexor contractures and limited due to long term structural changes and limitations.   Strength   Strength Comments 4/5 strength with proximal MMT and no  "complaints of pain   Bed Mobility   Bed Mobility Comments Moderate assitance supine to sit and sit erect in midline   Transfer Skills   Transfer Comments RW moderate assist to stay up right.  Left eye llegally blind.  Wears corrective lenses but reports \"in a safe place\" at home   Gait   Gait Comments RW left side neglect and leans heary to right.  Able to correct tactile but not with verbal cues or demonstration   Balance   Balance Comments Poor righitn response static and increased deficets with dynamic   General Therapy Interventions   Planned Therapy Interventions bed mobility training;gait training;neuromuscular re-education;ROM;strengthening;stretching;transfer training;risk factor education;home program guidelines;progressive activity/exercise   Clinical Impression   Criteria for Skilled Therapeutic Intervention yes, treatment indicated   PT Diagnosis Weakness and debility   Influenced by the following impairments Progression of safety concerns and level of needs and assistance and [facility]   Functional limitations due to impairments Strength, motion, posture and dizziness   Clinical Presentation Unstable/Unpredictable   Clinical Presentation Rationale Varied level of participation and [place] to activity   Clinical Decision Making (Complexity) High complexity   Therapy Frequency` daily   Predicted Duration of Therapy Intervention (days/wks) 4 days   Anticipated Discharge Disposition Transitional Care Facility   Risk & Benefits of therapy have been explained Yes   Patient, Family & other staff in agreement with plan of care Yes   Clinical Impression Comments Appears will require increased needs that [facility] would not be able to provide around the clock   [facility] AM-PAC  \"6 Clicks\" V.2 Basic Mobility Inpatient Short Form   1. Turning from your back to your side while in a flat bed without using bedrails? 2 - A Lot   2. Moving from lying on your back to sitting on the side of a flat bed without using " bedrails? 2 - A Lot   3. Moving to and from a bed to a chair (including a wheelchair)? 2 - A Lot   4. Standing up from a chair using your arms (e.g., wheelchair, or bedside chair)? 2 - A Lot   5. To walk in hospital room? 1 - Total   6. Climbing 3-5 steps with a railing? 1 - Total   Basic Mobility Raw Score (Score out of 24.Lower scores equate to lower levels of function) 10   Total Evaluation Time   Total Evaluation Time (Minutes) 30[KB1.1]        Revision History        User Key Date/Time User Provider Type Action    > KB1.1 5/29/2017  1:35 PM Samantha Sr PT Physical Therapist Sign                  Procedure Notes     No notes of this type exist for this encounter.         Progress Notes - Therapies (Notes from 05/29/17 through 06/01/17)      Progress Notes by Elida Soria PT at 5/31/2017  4:03 PM     Author:  Elida Soria PT Service:  (none) Author Type:  Physical Therapist    Filed:  5/31/2017  4:03 PM Date of Service:  5/31/2017  4:03 PM Creation Time:  5/31/2017  4:03 PM    Status:  Signed :  Elida Soria PT (Physical Therapist)            05/31/17 1500   Signing Clinician's Name / Credentials   Signing clinician's name / credentials Mellissa JERNIGAN   Additional Documentation   Rehab Comments cancel- schedule, deciding on goals of care; will check on pt 6/1[CS1.1]        Revision History        User Key Date/Time User Provider Type Action    > CS1.1 5/31/2017  4:03 PM Elida Soria PT Physical Therapist Sign            Progress Notes by Samantha Sr PT at 5/29/2017  5:09 PM     Author:  Samantha Sr PT Service:  Physical Medicine and Rehabilitation Author Type:  Physical Therapist    Filed:  5/29/2017  5:09 PM Date of Service:  5/29/2017  5:09 PM Creation Time:  5/29/2017  5:09 PM    Status:  Signed :  Samantha Sr PT (Physical Therapist)            05/29/17 1700   Signing Clinician's Name / Credentials   Signing clinician's name / credentials Samantha Sr PT, DPT SULEMAN  "STAR/t   Quick Adds   Rehab Discipline PT   Therapeutic Exercise   Minutes of Treatment 15   Treatment Detail Demonstrated no carry over and max assist and bed to come to sitting position.  Continues with confusion and less abiity to follow commands.  Less motivation and requests more assistance and lack of initiang mobiity or movement.   Additional Documentation   Rehab Comments Declining with skilled intervention.  Will discuss further at rounds.  Will always require 1:1 with exercises and lacks desire and balance to promote gait   PT Plan Work on sitting balance and righting response to assess change.s  Standing and gait maybe to much of challenge and patient has given up motivation to attempt.   [facility] AM-PAC  \"6 Clicks\" V.2 Basic Mobility Inpatient Short Form   1. Turning from your back to your side while in a flat bed without using bedrails? 2 - A Lot   2. Moving from lying on your back to sitting on the side of a flat bed without using bedrails? 2 - A Lot   3. Moving to and from a bed to a chair (including a wheelchair)? 1 - Total   4. Standing up from a chair using your arms (e.g., wheelchair, or bedside chair)? 1 - Total   5. To walk in hospital room? 1 - Total   6. Climbing 3-5 steps with a railing? 1 - Total   Basic Mobility Raw Score (Score out of 24.Lower scores equate to lower levels of function) 8   Total Session Time   Total Session Time (minutes) 15 minutes[KB1.1]        Revision History        User Key Date/Time User Provider Type Action    > KB1.1 5/29/2017  5:09 PM Samantha Sr PT Physical Therapist Sign            Progress Notes by Samantha Sr PT at 5/29/2017  1:42 PM     Author:  Samantha Sr PT Service:  Physical Medicine and Rehabilitation Author Type:  Physical Therapist    Filed:  5/29/2017  1:42 PM Date of Service:  5/29/2017  1:42 PM Creation Time:  5/29/2017  1:42 PM    Status:  Signed :  Samantha Sr PT (Physical Therapist)            05/28/17 1338 " "  Signing Clinician's Name / Credentials   Signing clinician's name / credentials Samantha Sr PT, DPT CEMONTRELLA, STAR/t   Quick Adds   Rehab Discipline PT   Sit-Stand Transfer Training   Minutes of Treatment (Sit-To-Stand Transfer Training) 25   Symptoms Noted During/After Treatment (Sit-To-Stand Transfer Training) dizziness   Sit-To-Stand Transfer [place] Level (Training) maximum assist (25% patient effort)   Sit-To-Stand Transfer Physical Assistance Level (Training) 1 person assist;verbal cues;supervision;set-up required;nonverbal cues (demo/gestures)   Sit-To-Stand Transfer Weight Bearing (Training) full weight-bearing   Sit-To-Stand Transfer Assist Device (Training) rolling walker   Stand-To-Sit Transfer [place] Level (Training) maximum assist (25% patient effort)   Stand-To-Sit Transfer Physical Assistance Level (Training) 1 person assist   Stand-To-Sit Transfer Weight Bearing (Training) full weight-bearing   Stand-To-Sit Transfer Assist Device (Training) rolling walker   Transfer Safety Analysis Concerns (Sit-To-Stand Training) decreased sequencing ability;decreased proprioception;inability to maintain weight-bearing restrictions without assist;decreased weight-shifting ability   Transfer Safety Analysis Impairments (Sit-To-Stand Training) impaired postural control;impaired motor control;impaired coordination;decreased strength   Therapeutic Exercise   Minutes of Treatment 15   Symptoms Noted During/After Treatment fatigue   Treatment Detail Trial of sitting righting response but patient is unable to respond approrpaitely of safely.  Unable to mainatail up right posture and does not feel she is not upright.  Leans to right and once corrected unable to mainatain.  Theresa of gait and unable to sequence to pick feet up or turn   [facility] AM-PAC  \"6 Clicks\" V.2 Basic Mobility Inpatient Short Form   1. Turning from your back to your side while in a flat bed without using bedrails? 2 - A Lot   2. " Moving from lying on your back to sitting on the side of a flat bed without using bedrails? 2 - A Lot   3. Moving to and from a bed to a chair (including a wheelchair)? 2 - A Lot   4. Standing up from a chair using your arms (e.g., wheelchair, or bedside chair)? 2 - A Lot   5. To walk in hospital room? 1 - Total   6. Climbing 3-5 steps with a railing? 1 - Total   Basic Mobility Raw Score (Score out of 24.Lower scores equate to lower levels of function) 10   Total Session Time   Total Session Time (minutes) 40 minutes[KB1.1]        Revision History        User Key Date/Time User Provider Type Action    > KB1.1 5/29/2017  1:42 PM Samantha Sr PT Physical Therapist Sign            Progress Notes by Samantha Sr PT at 5/29/2017  1:38 PM     Author:  Samantha Sr PT Service:  Physical Medicine and Rehabilitation Author Type:  Physical Therapist    Filed:  5/29/2017  1:38 PM Date of Service:  5/29/2017  1:38 PM Creation Time:  5/29/2017  1:38 PM    Status:  Signed :  Samantha Sr PT (Physical Therapist)            05/27/17 1100   Signing Clinician's Name / Credentials   Signing clinician's name / credentials Samantha Sr PT, DPT CEEAA   Quick Adds   Rehab Discipline PT   Sit-Stand Transfer Training   Minutes of Treatment (Sit-To-Stand Transfer Training) 23   Symptoms Noted During/After Treatment (Sit-To-Stand Transfer Training) dizziness;fatigue;significant change in vital signs   Sit-To-Stand Transfer [place] Level (Training) moderate assist (50% patient effort)   Sit-To-Stand Transfer Physical Assistance Level (Training) set-up required;supervision;verbal cues;nonverbal cues (demo/gestures);1 person assist   Sit-To-Stand Transfer Weight Bearing (Training) full weight-bearing   Sit-To-Stand Transfer Assist Device (Training) rolling walker   Stand-To-Sit Transfer [place] Level (Training) moderate assist (50% patient effort)   Stand-To-Sit Transfer Physical Assistance Level (Training)  "set-up required;supervision;verbal cues;nonverbal cues (demo/gestures);1 person assist   Stand-To-Sit Transfer Weight Bearing (Training) full weight-bearing   Stand-To-Sit Transfer Assist Device (Training) rolling walker   Transfer Safety Analysis Concerns (Sit-To-Stand Training) losing balance backward;decreased balance during turns;decreased sequencing ability;inability to maintain weight-bearing restrictions without assist   Transfer Safety Analysis Impairments (Sit-To-Stand Training) impaired balance;impaired coordination;impaired motor control;impaired postural control;decreased ROM;decreased strength   Additional Documentation   Rehab Comments Progress to more ait activites   PT Plan Increased HR and and systolic pressure with activity.  Increased report of 'dizziness\"  Maybe more cervical [facility]    [facility] AM-PAC  \"6 Clicks\" V.2 Basic Mobility Inpatient Short Form   1. Turning from your back to your side while in a flat bed without using bedrails? 2 - A Lot   2. Moving from lying on your back to sitting on the side of a flat bed without using bedrails? 2 - A Lot   3. Moving to and from a bed to a chair (including a wheelchair)? 2 - A Lot   4. Standing up from a chair using your arms (e.g., wheelchair, or bedside chair)? 2 - A Lot   5. To walk in hospital room? 1 - Total   6. Climbing 3-5 steps with a railing? 1 - Total   Basic Mobility Raw Score (Score out of 24.Lower scores equate to lower levels of function) 10   Total Session Time   Total Session Time (minutes) 23 minutes[KB1.1]        Revision History        User Key Date/Time User Provider Type Action    > KB1.1 5/29/2017  1:38 PM Samantha Sr PT Physical Therapist Sign            Progress Notes by Samantha Sr PT at 5/29/2017  1:35 PM     Author:  Samantha Sr PT Service:  Physical Medicine and Rehabilitation Author Type:  Physical Therapist    Filed:  5/29/2017  1:35 PM Date of Service:  5/29/2017  1:35 PM Creation Time:  " 5/29/2017  1:35 PM    Status:  Signed :  Remberto Samantha COONEY, PT (Physical Therapist)            05/27/17 1100   Quick Adds   Type of Visit Initial PT Evaluation   Living Environment   Lives With facility resident   Living Arrangements assisted living   Home Accessibility no concerns   Living Environment Comment Falls in the bathroom.  Split water on the floor and fell bathrrom backwards when she slipped on the water.   Self-Care   Dominant Hand right   Usual Activity Tolerance fair   Current Activity Tolerance fair   Equipment Currently Used at Home grab bar;raised toilet;shower chair;walker, rolling   Functional Level Prior   Ambulation 1-->assistive equipment   Transferring 1-->assistive equipment   Toileting 1-->assistive equipment   Bathing 1-->assistive equipment   Dressing 0-->independent   Eating 0-->independent   Communication 0-->understands/communicates without difficulty   Swallowing 0-->swallows foods/liquids without difficulty   Fall history within last six months yes   Number of times patient has fallen within last six months 5   Prior Functional Level Comment Falls backwards.   General Information   Onset of Illness/Injury or Date of Surgery - Date 05/26/17   Cognitive Status Examination   Orientation not oriented to person, place or time   Level of Consciousness confused;alert   Follows Commands and Answers Questions 50% of the time   Personal Safety and Judgment impaired;at risk behaviors demonstrated;impulsive   Memory impaired   Cognitive Comment Varied levels of accuracy with orientation and memory throughout treament   Pain Assessment   Patient Currently in Pain No   Posture    Posture Forward head position;Protracted shoulders;Kyphosis   Range of Motion (ROM)   ROM Comment Functiaonl to shoulder ht with arms and ADLS with level of assitance she requires.  Lower extremity hip flexor contractures and limited due to long term structural changes and limitations.   Strength   Strength Comments  "4/5 strength with proximal MMT and no complaints of pain   Bed Mobility   Bed Mobility Comments Moderate assitance supine to sit and sit erect in midline   Transfer Skills   Transfer Comments RW moderate assist to stay up right.  Left eye llegally blind.  Wears corrective lenses but reports \"in a safe place\" at home   Gait   Gait Comments RW left side neglect and leans heary to right.  Able to correct tactile but not with verbal cues or demonstration   Balance   Balance Comments Poor righitn response static and increased deficets with dynamic   General Therapy Interventions   Planned Therapy Interventions bed mobility training;gait training;neuromuscular re-education;ROM;strengthening;stretching;transfer training;risk factor education;home program guidelines;progressive activity/exercise   Clinical Impression   Criteria for Skilled Therapeutic Intervention yes, treatment indicated   PT Diagnosis Weakness and debility   Influenced by the following impairments Progression of safety concerns and level of needs and assistance and REGAN   Functional limitations due to impairments Strength, motion, posture and dizziness   Clinical Presentation Unstable/Unpredictable   Clinical Presentation Rationale Varied level of participation and toelrance to activity   Clinical Decision Making (Complexity) High complexity   Therapy Frequency` daily   Predicted Duration of Therapy Intervention (days/wks) 4 days   Anticipated Discharge Disposition Transitional Care Facility   Risk & Benefits of therapy have been explained Yes   Patient, Family & other staff in agreement with plan of care Yes   Clinical Impression Comments Appears will require increased needs that assisted would not be able to provide around the clock   [facility] AM-PAC  \"6 Clicks\" V.2 Basic Mobility Inpatient Short Form   1. Turning from your back to your side while in a flat bed without using bedrails? 2 - A Lot   2. Moving from lying on your back to sitting on the " side of a flat bed without using bedrails? 2 - A Lot   3. Moving to and from a bed to a chair (including a wheelchair)? 2 - A Lot   4. Standing up from a chair using your arms (e.g., wheelchair, or bedside chair)? 2 - A Lot   5. To walk in hospital room? 1 - Total   6. Climbing 3-5 steps with a railing? 1 - Total   Basic Mobility Raw Score (Score out of 24.Lower scores equate to lower levels of function) 10   Total Evaluation Time   Total Evaluation Time (Minutes) 30[KB1.1]        Revision History        User Key Date/Time User Provider Type Action    > KB1.1 5/29/2017  1:35 PM Samantha Sr, PT Physical Therapist Sign

## 2017-05-27 PROBLEM — R29.6 FALLS: Status: ACTIVE | Noted: 2017-01-01
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
## 2017-05-27 PROBLEM — R26.81 UNSTEADY GAIT: Status: ACTIVE | Noted: 2017-01-01

## 2017-05-27 NOTE — PROGRESS NOTES
"[place] [facility]    Patient admitted to room 2302 at approximately 0115 via cart from emergency room. Patient was accompanied by transport tech.     Verbal SBAR report received from SIM Shields prior to patient arrival.     Patient ambulated to bed with one assist. Patient alert and oriented X 3. The patient is not having any pain. 0-10 Pain Scale: 2. Admission vital signs: Blood pressure 186/70, temperature 97.3  F (36.3  C), temperature source Oral, resp. rate 18, height 1.549 m (5' 1\"), weight 57.3 kg (126 lb 5.2 oz), SpO2 96 %, not currently breastfeeding. Patient was oriented to plan of care, call light, bed controls, tv, telephone, bathroom and visiting hours.     The following safety risks were identified during admission: fall. Yellow risk band applied: YES.     Doris Macias    "

## 2017-05-27 NOTE — ED NOTES
Pt reports falling yesterday, and this morning.  Only complaint of left elbow pain, but reports that she has had it before.  Also, ongoing knee pain

## 2017-05-27 NOTE — PROGRESS NOTES
"Occupational Therapy Evaluation     05/27/17 1200   Quick Adds   Type of Visit Initial Occupational Therapy Evaluation   Living Environment   Lives With facility resident   Living Arrangements assisted living   Home Accessibility no concerns   Living Environment Comment Pt resides at [facility].    Self-Care   Dominant Hand right   Usual Activity Tolerance fair   Current Activity Tolerance fair   Equipment Currently Used at Home grab bar;raised toilet;shower chair;walker, rolling   Activity/Exercise/Self-Care Comment Reports she has recently been requiring more assistance with ADLs d/t \"dizziness\"   Functional Level Prior   Ambulation 1-->assistive equipment   Transferring 1-->assistive equipment   Toileting 1-->assistive equipment   Bathing 1-->assistive equipment   Dressing 0-->independent   Eating 0-->independent   Communication 0-->understands/communicates without difficulty   Swallowing 0-->swallows foods/liquids without difficulty   Cognition 1 - attention or memory deficits   Fall history within last six months yes   Number of times patient has fallen within last six months 5   Prior Functional Level Comment Falls backwards.   General Information   Onset of Illness/Injury or Date of Surgery - Date 05/26/17   Referring Physician Dr. Hernandez   Patient/Family Goals Statement \"I get dizzy when I get up\"   Additional Occupational Profile Info/Pertinent History of Current Problem This patient is a 89 year old  female with a significant past medical history of dementia, benign brain tumor, hypertension, GERD, macular degeneration/glaucoma with legal blindness, and prior falls living in memory care who presents with increase in falls.  She fell twice yesterday, leading to her presentation in the ER last night and says she has fallen 4 times in the past 2 days.  Has had multiple falls prior to that, but unclear exactly how often, sounds like certainly not this frequently.  She " reportedly has hit her head multiple times, but doesn't think any loss of consciousness.  On presentation to the ER she was complaining of focal pain in the left knee and denies chest pain or dyspnea.   Precautions/Limitations fall precautions   General Observations pleasantly confused   General Info Comments elevated HR and BP with activity.    Cognitive Status Examination   Orientation person   Level of Consciousness alert   Cognitive Comment dementia   Visual Perception   Visual Perception Comments Pt is legally blind   Range of Motion (ROM)   ROM Comment BUE WFL   Strength   Strength Comments BUE grossly 4-/5   Coordination   Coordination Comments digit opposition WFL   Transfer Skills   Transfer Comments leans to the L when standing, needs close CGA to maintain balance and prevent fall.    Transfer Skill: Sit to Stand   Level of [place]: Sit/Stand stand-by assist   Physical Assist/Nonphysical Assist: Sit/Stand set-up required;supervision;verbal cues   Transfer Skill: Sit to Stand full weight-bearing   Assistive Device for Transfer: Sit/Stand rolling walker   Lower Body Dressing   Level of [place]: Dress Lower Body independent  (mod I for extra time needed to don socks)   Instrumental Activities of Daily Living (IADL)   IADL Comments meals provided by [facility], has assist with setting up meds but doesn't always remember to take them   Activities of Daily Living Analysis   Impairments Contributing to Impaired Activities of Daily Living cognition impaired;balance impaired   Clinical Impression   Criteria for Skilled Therapeutic Interventions Met evaluation only;no significant expected improvement in functional status   OT Diagnosis decreased functional independence   Influenced by the following impairments impaired cognition, impaired balance, generalized weakness, dizziness   Assessment of Occupational Performance 3-5 Performance Deficits   Identified Performance Deficits toileting, bathing, functional  "transfers   Clinical Decision Making (Complexity) Moderate complexity   Anticipated Discharge Disposition Long Term Care Facility   Risks and Benefits of Treatment have been explained. Yes   Patient, Family & other staff in agreement with plan of care Yes   Clinical Impression Comments Pt presents with generalized weakness, dizziness, impaired balance, and impaired cognition impacting her ability to complete ADLs safely and independently. Pt appears to require increased level of services than she currently has d/t mult recent falls. Pt would not benefit from ongoing OT d/t advanced dementia and limited ability to improve functional independence.    [facility] AM-PAC TM \"6 Clicks\"   2016, Trustees of [facility], under license to [employer].  All rights reserved.   6 Clicks Short Forms Daily Activity Inpatient Short Form   [facility] AM-PAC  \"6 Clicks\" Daily Activity Inpatient Short Form   1. Putting on and taking off regular lower body clothing? 3 - A Little   2. Bathing (including washing, rinsing, drying)? 3 - A Little   3. Toileting, which includes using toilet, bedpan or urinal? 3 - A Little   4. Putting on and taking off regular upper body clothing? 4 - None   5. Taking care of personal grooming such as brushing teeth? 4 - None   6. Eating meals? 4 - None   Daily Activity Raw Score (Score out of 24.Lower scores equate to lower levels of function) 21   Total Evaluation Time   Total Evaluation Time (Minutes) 15     Izabella Medina OTR/L  "

## 2017-05-27 NOTE — PROGRESS NOTES
Patient complained of new onset chest pain.  Reports it started this am, but is getting better.  Denies other symptoms and c/o headache.  States pain starts in arm pit and extends towards and over the chest.  Denies SOB.  Page sent to Dr. Bentley-new orders.  MD at bedside, lab at bedside, and EKG ordered.    Will recheck BP in 40 minutes post antihypertensive and update MD as requested.

## 2017-05-27 NOTE — ED NOTES
"Patient has  [place] to Observation  order. Patient has been given the Observation brochure -  What does Observation mean to me.\"  Patient has been given the opportunity to ask questions about observation status and their plan of care.  Discussed with Son, Ernesto and daughter in law, Brandee.  Rona Mata  "

## 2017-05-27 NOTE — CONSULTS
Care Transition Initial Assessment - RN  Reason For Consult: discharge planning   Met with: Patient., spoke to her son, Ernesto on the phone.  DATA  Active Problems:    Essential hypertension    Benign tumor of brain (H)    Macular degeneration    GERD (gastroesophageal reflux disease)    Glaucoma    Unsteady gait    Fall    Falls       Primary Care Clinic Name: [facility]  Primary Care MD Name: Devi May, ALICE CNP  Contact information and PCP information verified: Yes      ASSESSMENT  Cognitive Status: awake and alert.             Lives With: facility resident  Living Arrangements: assisted living                 Insurance Concerns: No Insurance issues identified          This writer met with pt ,spoke to her son, Ernesto [phone] on the phone, introduced self and role.       PLAN    Patient resides at [facility] on [facility] [phone], fax [phone]. Currently her services include escorts to meals (she has been walking with walker), assistance with dressing and hearing aid. Patient's son states they will increase services to include medication administration and more frequent checks with bathroom assistance. Message left at the [facility] nursing office requesting call back.   C also to be added with [facility] ([phone] Fax: [phone]) as preferred service provider. Patient's son states they would like to bring her back to the [facility] with increased services and they will be exploring LTC options at [facility].     Addendum: received call back message from [facility] Nurse Earlene [phone] stating services for patient can be increased, the [facility] staff will meet with patient and her son upon her return to confirm plan.         Stacey Chappell -[phone]

## 2017-05-27 NOTE — ED NOTES
Ambulated pt per MD request.  Used walker and RN assist.  Pt was able to ambulate with a strong lean to the right.  Unable to ambulate without assist.  MD notified.

## 2017-05-27 NOTE — H&P
[facility] History and Physical    Angela Esposito MRN# [id number]   Age: 89 year old YOB: 1916     Date of Admission:  5/26/2017      Primary care provider: Devi May          Assessment and Plan:   Falls with longstanding history of   Unsteady gait  5/27/2017 -- long-standing problem but appears acutely worse past few days. No clear lab abnormalities on admission - checking UA and chest x-ray today.  Evaluating chest pain as below, but doubt MI or arrythmia as cause but will place on telemetry overnight.  PE possibility but again unlikely - checking D-dimer.  Doubt seizures as discussed below.  Has a history of vertigo in the past and reports continued dizziness with ambulation - will try some meclizine as needed, physical therapy and occupational therapy consulted.  May need increased services from current memory care.      Chest pain  5/27/2017 -- unclear timing on this - suspect most likely musculoskeletal but with increased falls without clear cause will check troponin and ECG to help with determining possible etiology and with prognosis although no intervention planned beyond likely starting aspirin if either shows clear acute change, otherwise in context of falls will hold off on adding aspirin for now.  Also checking d-dimer as above.   Giving GI cocktail, checking chest x-ray.        GERD (gastroesophageal reflux disease)  5/27/2017 -- continuing home omeprazole, giving GI cocktail x 1 as patient thinks this could be causing her chest pain as above.        Essential hypertension  5/27/2017 -- blood pressure up, has been like this in the past and actually a bit higher when in pain.  Just given AM Cozaar - recheck in 1 hour.        History of Benign tumor of brain (H)  5/27/2017 -- no new changes seen on head CT.  She was apparently on keppra in the past for seizure prophylaxis, but isn't currently.  Seizures are a possible cause of her falls, but doesn't sounds  clearly like that.  given no new changes on imaging and no symptoms clearly pointing to seizures will not restart this at present.        Macular degeneration/glaucoma  5/27/2017 -- patient is legally blind - staff to announce and identify themselves on entering room.  Awaiting dosing of latanoprost and prednisolone eye drops.     Prophylaxis  startin lovenox    Lines  PIV    Disposition  Admitting to inpatient - will be here for at least a second midnight, very likely longer, needing ongoing evaluation for unclear source of acute change in falls and for chest pain as above - do not anticipate any aggressive therapies with primary goal comfort/quality, but still needing ongoing evaluation and determination of disposition.                Chief Complaint:   falls  History is obtained from the patient          History of Present Illness:   This patient is a 89 year old  female with a significant past medical history of dementia, benign brain tumor, hypertension, GERD, macular degeneration/glaucoma with legal blindness, and prior falls living in memory care who presents with increase in falls.  She fell twice yesterday, leading to her presentation in the ER last night and says she has fallen 4 times in the past 2 days.  Has had multiple falls prior to that, but unclear exactly how often, sounds like certainly not this frequently.  She reportedly has hit her head multiple times, but doesn't think any loss of consciousness.  On presentation to the ER she was complaining of focal pain in the left knee and denies chest pain or dyspnea.  Has not had headache or pain in the head last night or this AM.  Did say neck was mildly sore last night.  She does tell me that she's had some chest pain, although denied this in the ER last night -hard to tease out the timing of the pain - says she had it yesterday, then resolved with tylenol, then back overnight.  Currently sore in left mid lateral chest, worse with movement.   "Thinks this may be her heartburn as that has been giving her worse symptoms \"for a while\" she says.  Denies dyspnea.  Some radiation to left arm, no palpitations, chest heaviness, pressure or tightness.  She does say she feels somewhat \"achy all over\" today, but no focal areas of pain today other than the chest pain.  Says she still feels dizzy with spinning sensation when she was up overnight.  No vision changes (legally blind).  No changes in speech or swallowing.  Denies any loss of bowel or bladder control with falls and there wasn't any report of this.  Mental status appears at baseline - some inconsistency in reported history, but clear and coherent and oriented and responding to questions about current symptoms consistently.      Lives in memory care    Non-smoker, no alcohol, no illicit drug use.               Past Medical History:   I have reviewed this patient's past medical history.  Patient Active Problem List    Diagnosis Date Noted     Unsteady gait 05/27/2017     Priority: Medium     Fall 05/27/2017     Priority: Medium     Glaucoma 06/22/2015     Priority: Medium     Follows with Dr. Mckeon at [facility]       Pain in shoulder 02/04/2013     Priority: Medium     right shoulder and decreased range of motion         At risk for falling 02/04/2013     Priority: Medium     GERD (gastroesophageal reflux disease) 07/11/2012     Priority: Medium     Advanced directives, counseling/discussion 03/12/2012     Priority: Medium     Advance Care Planning:   Receipt of ACP document:  Received: POLST which was signed and dated by provider on 5/29/2013  .  Document not previously scanned.  Reviewed for validity as medical order and sent to be scanned.  Code Status needs to be updated to reflect choices in most recent ACP document. Orders placed.   See permanent comments section of demographics in clinical tab. View document(s) and details by clicking on code status.   Added by Jessica Andujar on " 6/24/2013.    Discussed advance care planning with patient; information given to patient to review. 3/12/2012        Vision loss 12/16/2010     Priority: Medium     Complete loss--left eye.       Macular degeneration 12/13/2010     Priority: Medium     Legally blind         Retinal artery occlusion 12/13/2010     Priority: Medium     Lipid disorder 12/10/2010     Priority: Medium     CARDIOVASCULAR SCREENING; LDL GOAL LESS THAN 130 10/31/2010     Priority: Medium     Vertigo 05/20/2010     Priority: Medium     Vertigo, most likely due to vestibular neuritis.   neuro assessment 4/10       Sensorineural hearing loss, bilateral 04/22/2010     Priority: Medium     Benign tumor of brain (H) 03/09/2010     Priority: Medium     March 9, 2010 MRI done after episode of dizziness  IMPRESSION: No significant change from 08/06/2009. Subfrontal mass  with associated trapped arachnoid cyst in parenchymal T2 signal  changes which could be due to edema or gliosis. Atrophy with some  chronic white matter changes. No evidence for acute infarct, acute  hemorrhage or any acute process.                Simple dental caries 01/28/2010     Priority: Medium     Dr Ana Evans dentist called to report that Angela has dental caries and  is at the dental office for extraction- and due to hx of radiation is unable to do this depending on the level of radiation- will look      (Problem list name updated by automated process. Provider to review and confirm.)       DDD (degenerative disc disease), lumbar 05/20/2009     Priority: Medium     Central stenosis identified 2002 neuro consult recommendation epidural injection with improvement  Recurrence of pain 5/09 right lumbar with radiation to leg - will try epidural again       Fear 05/11/2009     Priority: Medium     She worries that someone is stealing from her or getting into her appartment - this has been a concern for last since 2005 and camera surveillance showed nothing.         Personal  history of fall 04/23/2008     Priority: Medium     Generalized osteoarthrosis, unspecified site 12/12/2007     Priority: Medium     NECK CHANGES ON XRAY - CHIROPRACT       Disorder of bone and cartilage 02/14/2006     Priority: Medium     Foxamax - improvement seen last dex 2003.  Falls with seizure 7/05 and 2/06 - no fracture  Problem list name updated by automated process. Provider to review       Diverticulosis of large intestine 02/14/2006     Priority: Medium     COLONOSCOPY 2002  Problem list name updated by automated process. Provider to review       Essential hypertension 02/14/2006     Priority: Medium     Losartan DC lisinopril due to cough  Problem list name updated by automated process. Provider to review       Polyneuropathy in other diseases classified elsewhere (H) 02/14/2006     Priority: Medium     Numbness in feet       Personal history of benign neoplasm of brain 02/14/2006     Priority: Medium     ON kEPPRA FOR SEIZURE PROPHALAXIS - dose increased after fall in bathroom 2/06  left frontal meningioma 2004 with radiation therapy no surgery. She received fractionated sterotactic radiotherapy at [facility] from 9/20/04 through 11/2/04. She received a total dose of 5040 cGy (centi-Gray units) to the left frontal area in 28 fractions      Problem list name updated by automated process. Provider to review                Past Surgical History:   I have reviewed this patient's past surgical history   Past Surgical History:   Procedure Laterality Date     C VAGINAL HYSTERECTOMY      Hysterectomy, Vaginal     CATARACT IOL, RT/LT      Cataract IOL RT     CATARACT IOL, RT/LT  7/98    Cataract IOL LT     CL AFF SURGICAL PATHOLOGY      leiomyoma  One ovary remains     COLONOSCOPY  2002    neg     SURGICAL HISTORY OF -   09/2004    Radiation Therapy Brain Tumor-Slow growing tumor and not malignant as per patient             Social History:   I have reviewed this patient's social history    Social History   Substance Use Topics     Smoking status: Never Smoker     Smokeless tobacco: Never Used     Alcohol use No             Family History:   I have reviewed this patient's family history  Family History   Problem Relation Age of Onset     CANCER Mother      eye     CANCER Father      lungs or kidneys     Breast Cancer Sister      CANCER Sister      ?ovarian     Prostate Cancer Brother      CANCER Brother      ear and lung             Immunizations:   Immunizations are current          Allergies:     Allergies   Allergen Reactions     Sulfa Drugs              Medications:     Prescriptions Prior to Admission   Medication Sig Dispense Refill Last Dose     multivitamin, therapeutic with minerals (MULTI-VITAMIN) TABS tablet Take 1 tablet by mouth daily Tab-A-Burak 90 tablet 3 Unknown at Unknown time     losartan (COZAAR) 25 MG tablet Take 1 tablet (25 mg) by mouth daily 90 tablet 3 Unknown at Unknown time     CALCIUM-VITAMIN D PO Daily , unknown dose   Unknown at Unknown time     ketoconazole (NIZORAL) 2 % shampoo Apply to the affected area and wash off after 5 minutes. 120 mL 1 Taking     order for DME Equipment being ordered: Wheelchair 1 Units 0 Unknown at Unknown time     latanoprost (XALATAN) 0.005 % ophthalmic solution    Unknown at Unknown time     omeprazole 20 MG tablet Take one capsule by mouth once daily 30-60 minutes before a meal. 90 tablet 1 Unknown at Unknown time     prednisoLONE acetate (PRED FORTE) 1 % ophthalmic suspension    Unknown at Unknown time     Glucosamine-Chondroit-Vit C-Mn (GLUCOSAMINE CHONDROITIN COMPLX) TABS Take 1 tablet by mouth 3 times daily.   Unknown at Unknown time     TYLENOL CAPS 500 MG OR 1 CAPSULE EVERY 4 HOURS AS NEEDED   Unknown at Unknown time             Review of Systems:    ROS: 10 point ROS neg other than the symptoms noted above in the HPI.           Physical Exam:   Blood pressure 197/59, temperature 97.3  F (36.3  C), temperature source Oral, resp. rate  "16, height 1.549 m (5' 1\"), weight 57.3 kg (126 lb 5.2 oz), SpO2 97 %, not currently breastfeeding.  Temperatures:  Current - Temp: 97.3  F (36.3  C); Max - Temp  Av.5  F (36.4  C)  Min: 97.3  F (36.3  C)  Max: 97.8  F (36.6  C)  Respiration range: Resp  Av.3  Min: 16  Max: 18  Pulse range: No Data Recorded  Blood pressure range: Systolic (24hrs), Av , Min:176 , Max:203   ; Diastolic (24hrs), Av, Min:59, Max:78    Pulse oximetry range: SpO2  Av.5 %  Min: 93 %  Max: 97 %  No intake or output data in the 24 hours ending 17 0830  EXAM:  General: awake and alert, NAD, oriented x 3  Head: normocephalic  Neck: unremarkable, no lymphadenopathy   HEENT: oropharynx pink and moist    Heart: Regular rate and rhythm, 1-2/6 systolic murmur, no rubs, or gallops  Lungs: clear to auscultation bilaterally with good air movement throughout  Abdomen: soft, non-tender, no masses or organomegaly  Extremities: no edema in lower extremities   Skin unremarkable.             Data:     Results for orders placed or performed during the hospital encounter of 17 (from the past 24 hour(s))   CT Head w/o Contrast    Narrative    CT HEAD W/O CONTRAST   2017 8:48 PM     HISTORY: falls    TECHNIQUE: Axial images of the head without IV contrast material.  Radiation dose for this scan was reduced using automated exposure  control, adjustment of the mA and/or kV according to patient size, or  iterative reconstruction technique.    COMPARISON: MR scan dated to    FINDINGS: Again noted is a partially calcified and partially cystic  mass in the inferior frontal region. This corresponds to the enhancing  mass seen on the previous MR scan. This probably is due to a  meningioma. It has not changed significantly in size since the  previous scan. There is generalized atrophy of the brain.  Areas of  low attenuation are present in the white matter of the cerebral  hemispheres that are consistent with small vessel " ischemic disease in  this age patient. There is no evidence of intracranial hemorrhage,  mass, acute infarct or anomaly. The visualized portions of the sinuses  and mastoids appear normal. No intracranial bleed or skull fractures  are identified..      Impression    IMPRESSION:   1. No intracranial bleed. No skull fractures.  2. Atrophy of the brain.  White matter changes consistent with small  vessel ischemic disease.   3. No significant change in the partially calcified and partially  cystic subfrontal mass. This is consistent with a meningioma.    MITESH ZAPIEN MD   Cervical spine CT w/o contrast    Narrative    CT CERVICAL SPINE W/O CONTRAST 5/26/2017 8:50 PM     HISTORY:  fall, neck pain     TECHNIQUE:  Axial images of the cervical spine were obtained without  intravenous contrast. Coronal and sagittal reformations were  performed.  Radiation dose for this scan was reduced using automated  exposure control, adjustment of the mA and/or kV according to patient  size, or iterative reconstruction technique.    COMPARISON: None.    FINDINGS: There are 7  cervical type vertebrae used for the purpose of  this dictation. The alignment of the cervical spine is normal.   No  cervical spine fracture identified.    C1-C2:  Normal alignment.     C2-C3:  Normal disc. Severe degenerative change in the facet joints.     C3-C4:  Loss of disc space height. Central canal normal. Degenerative  change in the facet joints.     C4-C5:  Loss of disc space height. Central canal normal. Degenerative  change in the facet joints.      C5-C6:  Loss of disc space height. Central canal normal.     C6-C7:  Loss of disc space height. Central canal normal.    C7-T1:  Loss of disc space height. Central canal normal      Impression    IMPRESSION:   1. No fractures.  2. Multilevel degenerative change.    MITESH ZAPIEN MD   Knee XR, 2 view, right    Narrative    XR KNEE RT 1 /2 VW  5/26/2017 8:58 PM     HISTORY:  fall, pain, no swelling or  erythema    COMPARISON: Film dated 4/7/2016.    FINDINGS:  Mild chondrocalcinosis. No fractures. Small joint effusion  appears to be present. Vascular calcifications.      Impression    IMPRESSION: No fractures are identified.    MITESH ZAPIEN MD   Basic metabolic panel   Result Value Ref Range    Sodium 145 (H) 133 - 144 mmol/L    Potassium 4.0 3.4 - 5.3 mmol/L    Chloride 109 94 - 109 mmol/L    Carbon Dioxide 25 20 - 32 mmol/L    Anion Gap 11 3 - 14 mmol/L    Glucose 131 (H) 70 - 99 mg/dL    Urea Nitrogen 15 7 - 30 mg/dL    Creatinine 0.80 0.52 - 1.04 mg/dL    GFR Estimate 66 >60 mL/min/1.7m2    GFR Estimate If Black 80 >60 mL/min/1.7m2    Calcium 8.8 8.5 - 10.1 mg/dL   CBC with platelets differential   Result Value Ref Range    WBC 6.0 4.0 - 11.0 10e9/L    RBC Count 4.09 3.8 - 5.2 10e12/L    Hemoglobin 12.6 11.7 - 15.7 g/dL    Hematocrit 39.2 35.0 - 47.0 %    MCV 96 78 - 100 fl    MCH 30.8 26.5 - 33.0 pg    MCHC 32.1 31.5 - 36.5 g/dL    RDW 13.8 10.0 - 15.0 %    Platelet Count 211 150 - 450 10e9/L    Diff Method Automated Method     % Neutrophils 55.7 %    % Lymphocytes 25.5 %    % Monocytes 14.9 %    % Eosinophils 2.7 %    % Basophils 1.0 %    % Immature Granulocytes 0.2 %    Absolute Neutrophil 3.3 1.6 - 8.3 10e9/L    Absolute Lymphocytes 1.5 0.8 - 5.3 10e9/L    Absolute Monocytes 0.9 0.0 - 1.3 10e9/L    Absolute Eosinophils 0.2 0.0 - 0.7 10e9/L    Absolute Basophils 0.1 0.0 - 0.2 10e9/L    Abs Immature Granulocytes 0.0 0 - 0.4 10e9/L     All cardiac studies reviewed by me.  All imaging studies reviewed by me.    Attestation:  I have reviewed today's vital signs, notes, medications, labs and imaging.  Amount of time performed on this history and physical: 65 minutes.        Donn Bentley MD

## 2017-05-27 NOTE — PLAN OF CARE
Problem: Goal Outcome Summary  Goal: Goal Outcome Summary  Outcome: Improving  No further complaints of chest pain.  Is incontinent/continent of urine.  Urine specimen sent to lab.  Requested meclizine from pharmacy and will start now.  She does complain of dizziness when up and gait is very unsteady.  She leans to the right at times and falls back, utilized 2 person and walker today to transfer.  D dimer 0.7-FYI sent to luis [facility].  Alert but forgetful. Is [place].  Able to make her needs known.

## 2017-05-27 NOTE — ED NOTES
Irrigated both ears per Doctor Karla. Very large amount of matter in both ears. Pt stated after that she feels better and can hear better without the hearing aids in. Replaced both hearing aids in Pt ears.

## 2017-05-27 NOTE — PLAN OF CARE
"Problem: Fall Risk (Adult)  Goal: Absence of Falls  Patient will demonstrate the desired outcomes by discharge/transition of care.   Outcome: Improving  Patient is alert oriented, assist of 1 with walker. Resting comfortably overnight. Denies pain, nausea, SOB. LS clear. [place]. /70 (BP Location: Right arm)  Temp 97.3  F (36.3  C) (Oral)  Resp 18  Ht 1.549 m (5' 1\")  Wt 57.3 kg (126 lb 5.2 oz)  SpO2 96%  BMI 23.87 kg/m2          "

## 2017-05-27 NOTE — ED PROVIDER NOTES
History     Chief Complaint   Patient presents with     Fall     patient has fallen twice today   vitals stable   HX of Brain tumors      HPI  Angela Esposito is a 89 year old female who presents for evaluation of falls.  The patient says that she has fallen 4 times in the past 2 days.  She says that she has hit her head multiple times, no loss of consciousness.  She is complaining of pain to the left knee, denies chest pain or difficulty breathing.  No headache.  She does report some mild neck pain.  She has taken acetaminophen for symptoms.  No vomiting, nausea, change in behavior, abdominal pain, diarrhea, dysuria, rash.    Past medical history includes dementia, hypertension, GERD  Daily medications include omeprazole, losartan  Allergies include sulfa  Does not smoke, drink alcohol, or use illicit drugs    I have reviewed the Medications, Allergies, Past Medical and Surgical History, and Social History in the Epic system.    Review of Systems  Pertinent positives and negatives listed in the HPI, all other systems reviewed and are negative.    Physical Exam   BP: 192/69  Heart Rate: 55  Temp: 97.8  F (36.6  C)  Resp: 18  SpO2: 94 %  Physical Exam  /66  Temp 97.8  F (36.6  C) (Oral)  Resp 18  SpO2 95%   GENERAL: Alert, cooperative, mild distress  HEAD: No scalp laceration, hematoma, or abrasion.  No tenderness.  EYES: Conjunctivae clear, EOM intact. PERLL, Pupils are 3 mm.  HEENT:  Normal external ears, normal TM's without evidence of hemotympanum.  No nasal discharge or evidence of septal hematoma. No facial instability or asymmetry. No evidence of intraoral trauma.  Intact bite without malalignment.  NECK: Mild midline tenderness, no lateral tenderness.  CHEST:  No crepitus or tenderness with AP or lateral compression  CARDIOVASCULAR : Nml rate, distal pulses are normal and symmetric  LUNGS: No respiratory distress.  GI: Soft, ND, NT.   PELVIS: No crepitus or tenderness with AP or lateral  compression  BACK: No step-offs palpated, no tenderness to palpation of thoracic or lumbar spine.  EXTREMITIES: No obvious deformities, tenderness to palpation of right knee and right shoulder  NEUROLOGICAL : GCS= 15.  Awake, alert, and oriented x 3.  Cranial nerves II-XII grossly intact. Normal muscle strength and tone, sensation to light touch grossly intact in all extremities.  No focal deficits.   SKIN : Normal color, no jaundice or rash. No abrasions.      ED Course     ED Course     Procedures             Critical Care time:  none               Labs Ordered and Resulted from Time of ED Arrival Up to the Time of Departure from the ED   BASIC METABOLIC PANEL - Abnormal; Notable for the following:        Result Value    Sodium 145 (*)     Glucose 131 (*)     All other components within normal limits   CBC WITH PLATELETS DIFFERENTIAL       Assessments & Plan (with Medical Decision Making)   89-year-old female who presents for evaluation of frequent falls.  Differential includes intracranial hemorrhage, skull fracture, cervical spine fracture, dehydration, anemia.  Multiple images obtained including CT head, CT cervical spine, and x-ray of the knee, all images reviewed independently, no signs of intracranial hemorrhage, skull fracture, cervical spine fracture, or knee fracture.  She does have what is likely a meningioma that appears to be unchanged in size.  Sodium 145, otherwise electro lites normal.  White blood cell count 6.0.  Hemoglobin 12.6.  Heart rate 55, blood pressure 176/64, recheck 97.8 F, SPO2 94% on room air.  She is given IV fluids.  On recheck she is feeling better, however when trying to get her up, she continues to list toward the right and needs significant support.  At this point she is not safe to go back to her memory care unit as they are unlikely to have enough assistance for the patient.  Therefore she is admitted to the medicine service for further treatment.  I discussed the case with  Dr. Hernandez who agrees to admit the patient to his service.    I discussed the patient's care with the family, we discussed given her age, intracranial mass, and memory issues that life sustaining treatments at the end of life would be futile.  They agreed with this, said that the patient is DNR/DNI, and that they would like to focus mostly on her comfort and safety.  Therefore I do believe that further evaluation and workup will be of little use, and mostly should be focused on the patient's comfort.    I have reviewed the nursing notes.    I have reviewed the findings, diagnosis, plan and need for follow up with the patient.    New Prescriptions    No medications on file       Final diagnoses:   Falls frequently   Unsteady gait       5/26/2017   [facility] EMERGENCY DEPARTMENT         ED to Inpatient Handoff:    Discussed with Dr. Hernandez at 11:53 PM   Patient accepted for Observation Stay  Pending studies include NA  Code Status: DNR/DNI, comfort cares            Fidel Acosta MD  05/26/17 2909

## 2017-05-28 NOTE — PROGRESS NOTES
Notified by Charge Nurse Tea MONTEMAYOR, that pt will not leave telemetry unit on body therefore no visualization of heart rhythm obtained.

## 2017-05-28 NOTE — PLAN OF CARE
Problem: Goal Outcome Summary  Goal: Goal Outcome Summary  Outcome: No Change  Given meclizine, she is not sure if this helps.  Too weak to walk, can take a few steps in room to bedside commode.  Eating small amounts of food.  Offered tylenol this afternoon and she declined.  She denies that she has had any chest pain today.  Poor appetite and fluids encouraged.  No attempts made to self transfer, attempted to take off gown x 1, no behaviors noted.  Pleasant and social with staff.

## 2017-05-28 NOTE — PLAN OF CARE
After multiple attempts with re-applying tele unit  Patient removed tele constantly.  Patient confused & forgetful.  STEFAN Cunningham notified telemetry unit off pt

## 2017-05-28 NOTE — PROGRESS NOTES
[facility] Hospitalist Progress Note           Assessment and Plan:     Falls with longstanding history of   Unsteady gait  5/27/2017 -- long-standing problem but appears acutely worse past few days. No clear lab abnormalities on admission.  Evaluating chest pain as below, but doubt MI or arrythmia as cause but will place on telemetry overnight.  PE possibility but again unlikely.  Doubt seizures as discussed below.  Has a history of vertigo in the past and reports continued dizziness with ambulation - will try some meclizine as needed, physical therapy and occupational therapy consulted.  May need increased services from current memory care.    5/28/2017 -- CT negative for PE, UA and chest x-ray unremarkable.  Continue with physical therapy and with meclizine as needed.  May need placement as below.       Chest pain  5/27/2017 -- unclear timing on this - suspect most likely musculoskeletal but with increased falls without clear cause will check troponin and ECG to help with determining possible etiology and with prognosis although no intervention planned beyond likely starting aspirin if either shows clear acute change, otherwise in context of falls will hold off on adding aspirin for now.  Also checking d-dimer as above.   Giving GI cocktail, checking chest x-ray.    5/28/2017 -- resolved and has not recurred - unclear if GI cocktail helped or not.  Does not look cardiac. No further evaluation at this point.         GERD (gastroesophageal reflux disease) with hiatal hernia   5/27/2017 -- continuing home omeprazole, giving GI cocktail x 1 as patient thinks this could be causing her chest pain as above.    5/28/2017 -- no symptoms today.  Continue omeprazole.  Hiatal hernia seen on CT.         Essential hypertension  5/27/2017 -- blood pressure up, has been like this in the past and actually a bit higher when in pain.  Just given AM Cozaar - recheck in 1 hour.    5/28/2017 -- blood pressure fluctauting but  always high and sometimes markedly elevated - will increase losartan to 50 from 25 mg and follow.  Asymptomatic.         History of Benign tumor of brain (H)  5/27/2017 -- no new changes seen on head CT.  She was apparently on keppra in the past for seizure prophylaxis, but isn't currently.  Seizures are a possible cause of her falls, but doesn't sounds clearly like that.  given no new changes on imaging and no symptoms clearly pointing to seizures will not restart this at present.         Macular degeneration/glaucoma  5/27/2017 -- patient is legally blind - staff to announce and identify themselves on entering room.  Awaiting dosing of latanoprost and prednisolone eye drops.      Lung nodules  5/28/2017 -- seen on chest CT - patient denies smoking in the past - if she had recommendation would be for repeat imaging in 12 months -however in her case I doubt this would be of any benefit.      Prophylaxis  on lovenox     Lines  PIV    Disposition  Improving but slowly - hard to determine how much of function is baseline vs new and how much improvement would be gained from rehabilitation but rehabilitation may be of benefit if goal is return to assisted living.              Interval History:   Currently doing well - says she feels better than yesterday, is oriented x 3 and clear and appropriate, denies any pain and hasn't had any further complaints of chest pain or dyspnea overnight.  However, she did have some apparent sundowning overnight with some increase in confusion and remained somewhat confused this AM per physical therapy - this afternoon however she is clear and appears like mental status is returned to baseline  Still weak and unsteady, unclear if meclizine is helping, but doesn't appear to correlate with times of confusion so will continue to try it as needed.             Review of Systems:    ROS: 10 point ROS neg other than the symptoms noted above in the HPI.             Medications:   Current active  medications and PTA medications reviewed, see medication list for details.            Physical Exam:   Vitals were reviewed  Patient Vitals for the past 24 hrs:   BP Temp Temp src Heart Rate Resp SpO2   17 1554 (!) 204/70 - - - - -   17 1550 (!) 201/71 - - - - -   17 1500 - 97.1  F (36.2  C) Oral - 16 -   17 1300 147/56 96.3  F (35.7  C) Oral 67 16 95 %   17 0730 170/73 - - 74 16 95 %   17 1858 196/68 97.6  F (36.4  C) Oral 70 16 96 %       Temperatures:  Current - Temp: 97.1  F (36.2  C); Max - Temp  Av  F (36.1  C)  Min: 96.3  F (35.7  C)  Max: 97.6  F (36.4  C)  Respiration range: Resp  Av  Min: 16  Max: 16  Pulse range: No Data Recorded  Blood pressure range: Systolic (24hrs), Av , Min:147 , Max:204   ; Diastolic (24hrs), Av, Min:56, Max:73    Pulse oximetry range: SpO2  Av.3 %  Min: 95 %  Max: 96 %  I/O last 3 completed shifts:  In: 420 [P.O.:420]  Out: -     Intake/Output Summary (Last 24 hours) at 17 1603  Last data filed at 17 1300   Gross per 24 hour   Intake              420 ml   Output                0 ml   Net              420 ml     EXAM:  General: awake and alert, NAD, oriented x 3  Head: normocephalic  Neck: unremarkable, no lymphadenopathy   HEENT: oropharynx pink and moist    Heart: Regular rate and rhythm, no murmurs, rubs, or gallops  Lungs: clear to auscultation bilaterally with good air movement throughout  Abdomen: soft, non-tender, no masses or organomegaly  Extremities: no edema in lower extremities   Skin unremarkable.               Data:     Results for orders placed or performed during the hospital encounter of 17 (from the past 24 hour(s))   CT Chest pulmonary embolism w contrast    Narrative    CT CHEST PULMONARY EMBOLISM WITH CONTRAST  2017 4:41 PM    HISTORY:   Possible PE, elevated D-dimer, left lateral chest pain with  falls, question chest wall injury.    TECHNIQUE: Scans obtained from the apices  through the diaphragm with  IV contrast. 65 mL Isovue 370 injected.  Radiation dose for this scan was reduced using automated exposure  control, adjustment of the mA and/or kV according to patient size, or  iterative reconstruction technique.    COMPARISON:  Chest x-rays dated 5/27/2017.    FINDINGS:  Two tiny nodules measuring less than 0.3 cm are seen in the  left upper lung lobe (image 40 series 5 and image 37 series 5).  Atelectasis in the posterior lung bases bilaterally, lungs are  otherwise grossly clear.    The heart is upper normal size. There is a small hiatal hernia  containing stomach. There is nonaneurysmal atherosclerosis of the  aorta without evidence for dissection.    Other than the aortic atherosclerosis, probable cyst in the right  kidney and the hiatal hernia, the visualized portions of the upper  abdominal contents are grossly unremarkable.  Degenerative changes are  seen in the spine. No aggressive osseous lesions are noted. Chronic,  healed, left lateral third and fourth rib fractures are noted. No  acute fracture seen.    The central to third order pulmonary arteries demonstrate no filling  defects to suggest pulmonary artery embolism.      Impression    IMPRESSION:   1.  No evidence for pulmonary artery embolism.  2.  Two tiny nodules measuring less than 0.3 cm are seen in the left  upper lung lobe. For an indeterminate lung nodule < or equal to 4 mm :  Low risk patients: No follow-up needed.  High risk patients: Follow-up CT at 12 months; if unchanged, no  further follow-up.    - Low Risk: Minimal or absent history of smoking and of other known  risk factors.  - Nonsolid (ground glass) or partly solid nodules may require longer  follow-up to exclude indolent adenocarcinoma.  - Fleischner Society Recommendations, Radiology 2005.    3. Small hiatal hernia contains the stomach.  4. Nonaneurysmal atherosclerosis.   5. No pulmonary infiltrate. No acute rib fracture.     LEONELA MARTINS MD   CBC  with platelets   Result Value Ref Range    WBC 7.4 4.0 - 11.0 10e9/L    RBC Count 4.73 3.8 - 5.2 10e12/L    Hemoglobin 14.6 11.7 - 15.7 g/dL    Hematocrit 44.8 35.0 - 47.0 %    MCV 95 78 - 100 fl    MCH 30.9 26.5 - 33.0 pg    MCHC 32.6 31.5 - 36.5 g/dL    RDW 13.8 10.0 - 15.0 %    Platelet Count 231 150 - 450 10e9/L   Basic metabolic panel   Result Value Ref Range    Sodium 142 133 - 144 mmol/L    Potassium 3.8 3.4 - 5.3 mmol/L    Chloride 110 (H) 94 - 109 mmol/L    Carbon Dioxide 23 20 - 32 mmol/L    Anion Gap 9 3 - 14 mmol/L    Glucose 97 70 - 99 mg/dL    Urea Nitrogen 10 7 - 30 mg/dL    Creatinine 0.61 0.52 - 1.04 mg/dL    GFR Estimate >90  Non  GFR Calc   >60 mL/min/1.7m2    GFR Estimate If Black >90   GFR Calc   >60 mL/min/1.7m2    Calcium 8.8 8.5 - 10.1 mg/dL   Troponin I   Result Value Ref Range    Troponin I ES  0.000 - 0.045 ug/L     <0.015  The 99th percentile for upper reference range is 0.045 ug/L.  Troponin values in   the range of 0.045 - 0.120 ug/L may be associated with risks of adverse   clinical events.             Attestation:  I have reviewed today's vital signs, notes, medications, labs and imaging.  Amount of time performed on this daily note: 30 minutes.     Donn Bentley MD, MD

## 2017-05-28 NOTE — PROGRESS NOTES
Reason for Follow up: discharge planning    Anticipated DC needs:  Per PT/nursing care team patient continues to require more assistance than her baseline, TCU recommended for continued therapy. Spoke to patient and her son Ernesto and his wife. Referrals sent to [facility] (Phone: [phone] Fax: [phone])  [facility] (Phone: [phone]) Fax: ([phone])  [facility] (Phone: [phone]) Fax: ([phone])  Patient's son states he recognizes SNF/ LTC may be needed soon and he is exploring options.     Next Steps: unsafe to return to [place] even with increased services, TCU referrals pending.     Stacey Chappell RN, [phone]

## 2017-05-28 NOTE — PROGRESS NOTES
/70's-MD notified.    Recheck 197/71-new orders   Patient required EZ-stand to transfer from chair back into bed at 1600.  Leaning more to the right at this time ( does lean to the right, but was worse).  Discussed with MD, will continue to watch and provide safe transfers.  She is alert and pleasant, no other concerns noted.

## 2017-05-29 NOTE — PLAN OF CARE
"Problem: Goal Outcome Summary  Goal: Goal Outcome Summary  Outcome: No Change  Able to transfer with 2 people for breakfast, but required EZ-stand transfer for lunch.  Continues to be incontinent of urine.  Has been placed onto commode, but patient already incontinent, and then unable to void further onto the commode or voids onto floor when staff pulls down brief.  Family here and they state they seen her about 3 weeks ago, and she did not lean to her right side.  Patient states the leaning to the right began about \"two weeks ago, after the last two falls\", RN did not contact the [facility] to verify.  BP improved at lunch-156/54.  Some coughing with lunch and patient said the watermelon \"went down the wrong pipe.\"  Afebrile.  Alert and answering questions appropriately, although a difficult historian regarding health history and concerns.  Prior to admission skin tear healing and dressing to left elbow clean, dry, and intact.      "

## 2017-05-29 NOTE — PROGRESS NOTES
"   05/28/17 1338   Signing Clinician's Name / Credentials   Signing clinician's name / credentials Samantha Sr PT, DPT JOSUÉ SHELL/t   Quick Adds   Rehab Discipline PT   Sit-Stand Transfer Training   Minutes of Treatment (Sit-To-Stand Transfer Training) 25   Symptoms Noted During/After Treatment (Sit-To-Stand Transfer Training) dizziness   Sit-To-Stand Transfer [place] Level (Training) maximum assist (25% patient effort)   Sit-To-Stand Transfer Physical Assistance Level (Training) 1 person assist;verbal cues;supervision;set-up required;nonverbal cues (demo/gestures)   Sit-To-Stand Transfer Weight Bearing (Training) full weight-bearing   Sit-To-Stand Transfer Assist Device (Training) rolling walker   Stand-To-Sit Transfer [place] Level (Training) maximum assist (25% patient effort)   Stand-To-Sit Transfer Physical Assistance Level (Training) 1 person assist   Stand-To-Sit Transfer Weight Bearing (Training) full weight-bearing   Stand-To-Sit Transfer Assist Device (Training) rolling walker   Transfer Safety Analysis Concerns (Sit-To-Stand Training) decreased sequencing ability;decreased proprioception;inability to maintain weight-bearing restrictions without assist;decreased weight-shifting ability   Transfer Safety Analysis Impairments (Sit-To-Stand Training) impaired postural control;impaired motor control;impaired coordination;decreased strength   Therapeutic Exercise   Minutes of Treatment 15   Symptoms Noted During/After Treatment fatigue   Treatment Detail Trial of sitting righting response but patient is unable to respond approrpaitely of safely.  Unable to mainatail up right posture and does not feel she is not upright.  Leans to right and once corrected unable to mainatain.  [place] of gait and unable to sequence to pick feet up or turn   [facility] AM-PAC  \"6 Clicks\" V.2 Basic Mobility Inpatient Short Form   1. Turning from your back to your side while in a flat bed without using bedrails? 2 " - A Lot   2. Moving from lying on your back to sitting on the side of a flat bed without using bedrails? 2 - A Lot   3. Moving to and from a bed to a chair (including a wheelchair)? 2 - A Lot   4. Standing up from a chair using your arms (e.g., wheelchair, or bedside chair)? 2 - A Lot   5. To walk in hospital room? 1 - Total   6. Climbing 3-5 steps with a railing? 1 - Total   Basic Mobility Raw Score (Score out of 24.Lower scores equate to lower levels of function) 10   Total Session Time   Total Session Time (minutes) 40 minutes

## 2017-05-29 NOTE — PROGRESS NOTES
"   05/27/17 1100   Signing Clinician's Name / Credentials   Signing clinician's name / credentials Samantha Sr PT, DPT CEEAA   Quick Adds   Rehab Discipline PT   Sit-Stand Transfer Training   Minutes of Treatment (Sit-To-Stand Transfer Training) 23   Symptoms Noted During/After Treatment (Sit-To-Stand Transfer Training) dizziness;fatigue;significant change in vital signs   Sit-To-Stand Transfer [place] Level (Training) moderate assist (50% patient effort)   Sit-To-Stand Transfer Physical Assistance Level (Training) set-up required;supervision;verbal cues;nonverbal cues (demo/gestures);1 person assist   Sit-To-Stand Transfer Weight Bearing (Training) full weight-bearing   Sit-To-Stand Transfer Assist Device (Training) rolling walker   Stand-To-Sit Transfer [place] Level (Training) moderate assist (50% patient effort)   Stand-To-Sit Transfer Physical Assistance Level (Training) set-up required;supervision;verbal cues;nonverbal cues (demo/gestures);1 person assist   Stand-To-Sit Transfer Weight Bearing (Training) full weight-bearing   Stand-To-Sit Transfer Assist Device (Training) rolling walker   Transfer Safety Analysis Concerns (Sit-To-Stand Training) losing balance backward;decreased balance during turns;decreased sequencing ability;inability to maintain weight-bearing restrictions without assist   Transfer Safety Analysis Impairments (Sit-To-Stand Training) impaired balance;impaired coordination;impaired motor control;impaired postural control;decreased ROM;decreased strength   Additional Documentation   Rehab Comments Progress to more ait activites   PT Plan Increased HR and and systolic pressure with activity.  Increased report of 'dizziness\"  Maybe [facility]    [facility] AM-PAC  \"6 Clicks\" V.2 Basic Mobility Inpatient Short Form   1. Turning from your back to your side while in a flat bed without using bedrails? 2 - A Lot   2. Moving from lying on your back to sitting on the side " of a flat bed without using bedrails? 2 - A Lot   3. Moving to and from a bed to a chair (including a wheelchair)? 2 - A Lot   4. Standing up from a chair using your arms (e.g., wheelchair, or bedside chair)? 2 - A Lot   5. To walk in hospital room? 1 - Total   6. Climbing 3-5 steps with a railing? 1 - Total   Basic Mobility Raw Score (Score out of 24.Lower scores equate to lower levels of function) 10   Total Session Time   Total Session Time (minutes) 23 minutes

## 2017-05-29 NOTE — PLAN OF CARE
Problem: Goal Outcome Summary  Goal: Goal Outcome Summary  PT:  Decreased function and increased assistance needs today.  Dis oriented and required redirection along with assistance maintain up right positions.    Comments:   Recommendation:  TCU for higher level of skilled care and progression to best determine longer term needs and tolerance to independence and supervision

## 2017-05-29 NOTE — PROGRESS NOTES
"   05/29/17 1700   Signing Clinician's Name / Credentials   Signing clinician's name / credentials Samantha Sr PT, DPT JOSUÉ SHELL/t   Quick Adds   Rehab Discipline PT   Therapeutic Exercise   Minutes of Treatment 15   Treatment Detail Demonstrated no carry over and max assist and bed to come to sitting position.  Continues with confusion and less abiity to follow commands.  Less motivation and requests more assistance and lack of initiang mobiity or movement.   Additional Documentation   Rehab Comments Declining with skilled intervention.  Will discuss further at rounds.  Will always require 1:1 with exercises and lacks desire and balance to promote gait   PT Plan Work on sitting balance and righting response to assess change.s  Standing and gait maybe to much of challenge and patient has given up motivation to attempt.   [facility] AM-PAC  \"6 Clicks\" V.2 Basic Mobility Inpatient Short Form   1. Turning from your back to your side while in a flat bed without using bedrails? 2 - A Lot   2. Moving from lying on your back to sitting on the side of a flat bed without using bedrails? 2 - A Lot   3. Moving to and from a bed to a chair (including a wheelchair)? 1 - Total   4. Standing up from a chair using your arms (e.g., wheelchair, or bedside chair)? 1 - Total   5. To walk in hospital room? 1 - Total   6. Climbing 3-5 steps with a railing? 1 - Total   Basic Mobility Raw Score (Score out of 24.Lower scores equate to lower levels of function) 8   Total Session Time   Total Session Time (minutes) 15 minutes     "

## 2017-05-29 NOTE — PROGRESS NOTES
Attempted to put patient back to bed with EZ stand and 2 staff.  Patient unable to  EZ stand.  Transferred to room 2401 for lift room.  Family present and aware.  Did FYI Dr. Bentley at this time regarding increased weakness/leaning to the right

## 2017-05-29 NOTE — PLAN OF CARE
Problem: Goal Outcome Summary  Goal: Goal Outcome Summary  Outcome: Improving  Assisted into chair via ceiling lift and she tolerated well. Eye drops ordered, but she did not want them until bedtime.  Alarms in place, no attempts made to self transfer.  Alert and makes her needs known, took a nap this afternoon and woke up with brief episode of confusion.  Re orientated by daughter who was at the bedside.

## 2017-05-29 NOTE — PLAN OF CARE
"Problem: Goal Outcome Summary  Goal: Goal Outcome Summary  Outcome: Therapy, unable to show any progress toward functional goals  PT:  PT supports use of lift and RM change due to decreased abitity to participate in skilled progress and follow simple one step commands. Severe righting response and appears less desire to move.  Continues with report of \"just weak and dizzy\".    Comments:   Recommendation:  Declining ability to participate with work more on sitting balance and strategies due to safety concerns with gait and mobility.    "

## 2017-05-29 NOTE — PROGRESS NOTES
[facility] Hospitalist Progress Note           Assessment and Plan:     Falls with longstanding history of   Unsteady gait  5/27/2017 -- long-standing problem but appears acutely worse past few days. No clear lab abnormalities on admission.  Evaluating chest pain as below, but doubt MI or arrythmia as cause but will place on telemetry overnight.  PE possibility but again unlikely.  Doubt seizures as discussed below.  Has a history of vertigo in the past and reports continued dizziness with ambulation - will try some meclizine as needed, physical therapy and occupational therapy consulted.  May need increased services from current memory care.    5/28/2017 -- CT negative for PE, UA and chest x-ray unremarkable.  Continue with physical therapy and with meclizine as needed.  May need placement as below.    5/29/2017 -- no improvement - suspect CVA - discussed with patient and family - could do MRI tomorrow to confirm, but regardless this does not appear reversible.  May improve but a fair chance that it does not.  They would like to hold off on MRI and pursue TCU in the next couple of days.  If they change their mind will order MRI for tomorrow.       Chest pain  5/27/2017 -- unclear timing on this - suspect most likely musculoskeletal but with increased falls without clear cause will check troponin and ECG to help with determining possible etiology and with prognosis although no intervention planned beyond likely starting aspirin if either shows clear acute change, otherwise in context of falls will hold off on adding aspirin for now.  Also checking d-dimer as above.   Giving GI cocktail, checking chest x-ray.    5/28/2017 -- resolved and has not recurred - unclear if GI cocktail helped or not.  Does not look cardiac. No further evaluation at this point.    5/29/2017 -- no further issues.         GERD (gastroesophageal reflux disease) with hiatal hernia   5/27/2017 -- continuing home omeprazole, giving GI cocktail  x 1 as patient thinks this could be causing her chest pain as above.    5/28/2017 -- no symptoms today.  Continue omeprazole.  Hiatal hernia seen on CT.    5/29/2017 -- no further issues.       Essential hypertension  5/27/2017 -- blood pressure up, has been like this in the past and actually a bit higher when in pain.  Just given AM Cozaar - recheck in 1 hour.    5/28/2017 -- blood pressure fluctauting but always high and sometimes markedly elevated - will increase losartan to 50 from 25 mg and follow.  Asymptomatic.    5/29/2017 -- improved with increased cozaar.         History of Benign tumor of brain (H)  5/27/2017 -- no new changes seen on head CT.  She was apparently on keppra in the past for seizure prophylaxis, but isn't currently.  Seizures are a possible cause of her falls, but doesn't sounds clearly like that.  given no new changes on imaging and no symptoms clearly pointing to seizures will not restart this at present.          Macular degeneration/glaucoma  5/27/2017 -- patient is legally blind - staff to announce and identify themselves on entering room.  Awaiting dosing of latanoprost and prednisolone eye drops.   5/29/2017 -- restarted home drops.        Lung nodules  5/28/2017 -- seen on chest CT - patient denies smoking in the past - if she had recommendation would be for repeat imaging in 12 months -however in her case I doubt this would be of any benefit.       Prophylaxis  on lovenox      Lines  PIV    Disposition  Likely TCU on discharge with plan to consider long-term care/hospice if not improved but would forest to try to improve and see if she can get back to current living situation.              Interval History:   No new concerns, but continues to have trouble with balance and leaning to the right.  If anything strength and this balance trouble is getting worse as opposed to better. Always to the same side, no acute changes in symptoms. No pain, no dyspnea, no fever or chills.  No new  "concerns, patient reports feeling \"pretty good\" but needing assist of 2.            Review of Systems:    ROS: 10 point ROS neg other than the symptoms noted above in the HPI.           Medications:   Current active medications and PTA medications reviewed, see medication list for details.            Physical Exam:   Vitals were reviewed  Patient Vitals for the past 24 hrs:   BP Temp Temp src Heart Rate Resp SpO2   17 1530 152/48 98.4  F (36.9  C) Oral 57 16 95 %   17 1230 156/54 96.7  F (35.9  C) Oral 72 - 95 %   17 0907 143/56 - - 60 - -   17 0700 (!) 209/66 - Axillary 63 18 98 %   17 1600 196/71 - - 57 - -   17 1554 (!) 204/70 - - - - -       Temperatures:  Current - Temp: 98.4  F (36.9  C); Max - Temp  Av.6  F (36.4  C)  Min: 96.7  F (35.9  C)  Max: 98.4  F (36.9  C)  Respiration range: Resp  Av  Min: 16  Max: 18  Pulse range: No Data Recorded  Blood pressure range: Systolic (24hrs), Av , Min:143 , Max:209   ; Diastolic (24hrs), Av, Min:48, Max:71    Pulse oximetry range: SpO2  Av %  Min: 95 %  Max: 98 %  I/O last 3 completed shifts:  In: 120 [P.O.:120]  Out: -     Intake/Output Summary (Last 24 hours) at 17 1550  Last data filed at 17 1200   Gross per 24 hour   Intake              120 ml   Output                0 ml   Net              120 ml     EXAM:  General: awake and alert, NAD, oriented x 3  Head: normocephalic  Neck: unremarkable, no lymphadenopathy   HEENT: oropharynx pink and moist    Heart: Regular rate and rhythm, no murmurs, rubs, or gallops  Lungs: clear to auscultation bilaterally with good air movement throughout  Abdomen: soft, non-tender, no masses or organomegaly  Extremities: no edema in lower extremities   Skin unremarkable.     Neuro: complicated by exteremly poor vision but CN appear intact, no dramatically pronounced weakness, although right side in upper extremities and lower extremities may be slightly more weak " throughout.          Data:   No results found for this or any previous visit (from the past 24 hour(s)).        Attestation:  I have reviewed today's vital signs, notes, medications, labs and imaging.  Amount of time performed on this discharge summary: 30 minutes.     Donn Bentley MD, MD

## 2017-05-29 NOTE — PROGRESS NOTES
Patient leaning to the right side in bed and legs hanging off of the bed. Repositioned with the help of NST; placed a pillow on right side to help prevent leaning to right side. IV leaking; pulled. Consulted with charge regarding placing a new iv. Yadira will assess and let us know. Patient sleeping/resting comfortably; family request and order placed to not disturb patient while sleeping with vitals.    Continue to monitor and implement poc.

## 2017-05-29 NOTE — PROGRESS NOTES
05/27/17 1100   Quick Adds   Type of Visit Initial PT Evaluation   Living Environment   Lives With facility resident   Living Arrangements assisted living   Home Accessibility no concerns   Living Environment Comment Falls in the bathroom.  Split water on the floor and fell bathrrom backwards when she slipped on the water.   Self-Care   Dominant Hand right   Usual Activity Tolerance fair   Current Activity Tolerance fair   Equipment Currently Used at Home grab bar;raised toilet;shower chair;walker, rolling   Functional Level Prior   Ambulation 1-->assistive equipment   Transferring 1-->assistive equipment   Toileting 1-->assistive equipment   Bathing 1-->assistive equipment   Dressing 0-->independent   Eating 0-->independent   Communication 0-->understands/communicates without difficulty   Swallowing 0-->swallows foods/liquids without difficulty   Fall history within last six months yes   Number of times patient has fallen within last six months 5   Prior Functional Level Comment Falls backwards.   General Information   Onset of Illness/Injury or Date of Surgery - Date 05/26/17   Cognitive Status Examination   Orientation not oriented to person, place or time   Level of Consciousness confused;alert   Follows Commands and Answers Questions 50% of the time   Personal Safety and Judgment impaired;at risk behaviors demonstrated;impulsive   Memory impaired   Cognitive Comment Varied levels of accuracy with orientation and memory throughout treament   Pain Assessment   Patient Currently in Pain No   Posture    Posture Forward head position;Protracted shoulders;Kyphosis   Range of Motion (ROM)   ROM Comment Functiaonl to shoulder ht with arms and ADLS with level of assitance she requires.  Lower extremity hip flexor contractures and limited due to long term structural changes and limitations.   Strength   Strength Comments 4/5 strength with proximal MMT and no complaints of pain   Bed Mobility   Bed Mobility Comments  "Moderate assitance supine to sit and sit erect in midline   Transfer Skills   Transfer Comments RW moderate assist to stay up right.  Left eye llegally blind.  Wears corrective lenses but reports \"in a safe place\" at home   Gait   Gait Comments RW left side neglect and leans heary to right.  Able to correct tactile but not with verbal cues or demonstration   Balance   Balance Comments Poor righitn response static and increased deficets with dynamic   General Therapy Interventions   Planned Therapy Interventions bed mobility training;gait training;neuromuscular re-education;ROM;strengthening;stretching;transfer training;risk factor education;home program guidelines;progressive activity/exercise   Clinical Impression   Criteria for Skilled Therapeutic Intervention yes, treatment indicated   PT Diagnosis Weakness and debility   Influenced by the following impairments Progression of safety concerns and level of needs and assistance and REGAN   Functional limitations due to impairments Strength, motion, posture and dizziness   Clinical Presentation Unstable/Unpredictable   Clinical Presentation Rationale Varied level of participation and toelrance to activity   Clinical Decision Making (Complexity) High complexity   Therapy Frequency` daily   Predicted Duration of Therapy Intervention (days/wks) 4 days   Anticipated Discharge Disposition Transitional Care Facility   Risk & Benefits of therapy have been explained Yes   Patient, Family & other staff in agreement with plan of care Yes   Clinical Impression Comments Appears will require increased needs that prison would not be able to provide around the clock   [facility] AM-PAC  \"6 Clicks\" V.2 Basic Mobility Inpatient Short Form   1. Turning from your back to your side while in a flat bed without using bedrails? 2 - A Lot   2. Moving from lying on your back to sitting on the side of a flat bed without using bedrails? 2 - A Lot   3. Moving to and from a bed to a chair " (including a wheelchair)? 2 - A Lot   4. Standing up from a chair using your arms (e.g., wheelchair, or bedside chair)? 2 - A Lot   5. To walk in hospital room? 1 - Total   6. Climbing 3-5 steps with a railing? 1 - Total   Basic Mobility Raw Score (Score out of 24.Lower scores equate to lower levels of function) 10   Total Evaluation Time   Total Evaluation Time (Minutes) 30

## 2017-05-30 NOTE — PLAN OF CARE
Problem: Individualization  Goal: Patient Preferences  Outcome: Improving  Patient A/O; with episodes of confusion upon wakening. Repositioned at 0320 and expressed nonsensical talk. Could hear pt continue to have conversation after staff left room. Denies pain. Denies dizziness. Sleeping comfortably between cares.     Continue to monitor and implement poc.

## 2017-05-30 NOTE — PLAN OF CARE
Problem: Goal Outcome Summary  Goal: Goal Outcome Summary  Outcome: Improving  Pt denies discomfort, will have PT work with for strengthening.  Plan is probably LT care.

## 2017-05-30 NOTE — PROGRESS NOTES
Reason for Follow up: DC Planning    Anticipated discharge needs: This writer met with pt and pts son. Discussed at length return to care home vs TCU vs LTC. At this time family is unsure about how well she would do in a TCU. Discussed long term care, which they are very interested in, preferably at [facility] (Phone: [phone]) Fax: ([phone]) or [facility] (Phone: [phone]) Fax: ([phone]). Referrals pending at both. Discussed private pay and they are in agreement for that. This writer also spoke with the family about pts return back to her apt IF, LTC has no openings, and they are agreeable to paying someone 24 hours for help at home. First choice is LTC at a SNF. Palliative consult this afternoon. CTS will cont to follow.     Next steps: Palliative consult pending, waiting on LTC referrals    Swapna DE LEON, [facility] Hospital[facility], [facility] [phone]

## 2017-05-30 NOTE — PLAN OF CARE
Problem: Goal Outcome Summary  Goal: Goal Outcome Summary  PT-  Pt alert, very [place]. Pt required ,minimal assistance w/ supine> sitting;  Minimal to close SBA for static standing due to poor midline orientation- leans to the right. Initially required moving the LUE  Away to move pt to center. Once  In  Midline, corrects w/ cues. ( pt also decreased vision on left, ? preexisting ) . Pt transferred bed> chair w/ walker an minimal  assistance of 2.  Once in sitting, required pillows on right to maintain balance.      REC-   Discussed w/ pt continued therapy to address balance. Pt unsure re- this.  Pt alert, participating and  would be appropriate for TCU , depending on the pt's goals.  Pt currently requiring assistance of 2 for  safety w/ transfers. SW involved to assist w/ placement

## 2017-05-30 NOTE — PROGRESS NOTES
"Pt confused this evening, thinking she was in her own apartment, telling staff to leave her room, that she did not want any help with dinner and that she was \" tired of all the sex parties going on around this place.\"  Attempted to reorient, will monitor.  "

## 2017-05-30 NOTE — PROGRESS NOTES
Cross cover  5:53 PM   Pt agitated.  Thinks there are sex parties going on.  Angry, threatening.  No iv in  Try zyprexa 5 mg odt times one.

## 2017-05-30 NOTE — PROGRESS NOTES
[facility]           Assessment and Plan:     Falls with longstanding history of Unsteady gait - suspect subacute stroke  5/27/2017 -- long-standing problem but appears acutely worse past few days. No clear lab abnormalities on admission.  Evaluating chest pain as below, but doubt MI or arrythmia as cause but will place on telemetry overnight.  PE possibility but again unlikely.  Doubt seizures as discussed below.  Has a history of vertigo in the past and reports continued dizziness with ambulation - will try some meclizine as needed, physical therapy and occupational therapy consulted.  May need increased services from current memory care.    5/28/2017 -- CT negative for PE, UA and chest x-ray unremarkable.  Continue with physical therapy and with meclizine as needed.  May need placement as below.    5/29/2017 -- no improvement - suspect CVA - discussed with patient and family - could do MRI tomorrow to confirm, but regardless this does not appear reversible.  May improve but a fair chance that it does not.  They would like to hold off on MRI and pursue TCU in the next couple of days.  If they change their mind will order MRI for tomorrow.   5/30/2017 -- plan remains to forego MRI and manage conservatively.  Patient would still like to return to assisted living if able - appears a bit better today - will have physical therapy see her today, discuss options with family and have palliative care consult this afternoon.  Most likely plan from what patient and family have said so far would be TCU tomorrow with hope for return to assisted living but more likely outcome being needing long-term care, however discharge directly to hospice would also be very reasonable here.        Chest pain  5/27/2017 -- unclear timing on this - suspect most likely musculoskeletal but with increased falls without clear cause will check troponin and ECG to help with determining possible etiology and with  prognosis although no intervention planned beyond likely starting aspirin if either shows clear acute change, otherwise in context of falls will hold off on adding aspirin for now.  Also checking d-dimer as above.   Giving GI cocktail, checking chest x-ray.    5/28/2017 -- resolved and has not recurred - unclear if GI cocktail helped or not.  Does not look cardiac. No further evaluation at this point.    5/30/2017 -- no further issues.         GERD (gastroesophageal reflux disease) with hiatal hernia   5/27/2017 -- continuing home omeprazole, giving GI cocktail x 1 as patient thinks this could be causing her chest pain as above.    5/28/2017 -- no symptoms today.  Continue omeprazole.  Hiatal hernia seen on CT.    5/30/2017 -- no further issues.       Essential hypertension   5/28/2017 -- blood pressure fluctauting but always high and sometimes markedly elevated -  increase losartan to 50 from 25 mg   5/30/2017 -- doing well since increasing cozaar.       History of Benign tumor of brain (H)  5/27/2017 -- no new changes seen on head CT.  She was apparently on keppra in the past for seizure prophylaxis, but isn't currently.  Seizures are a possible cause of her falls, but doesn't sounds clearly like that.  given no new changes on imaging and no symptoms clearly pointing to seizures will not restart this at present.          Macular degeneration/glaucoma  5/27/2017 -- patient is legally blind - staff to announce and identify themselves on entering room.  Awaiting dosing of latanoprost and prednisolone eye drops.   5/29/2017 -- restarted home drops.        Lung nodules  5/28/2017 -- seen on chest CT - patient denies smoking in the past but unclear - if she had recommendation would be for repeat imaging in 12 months -regardless in her case I doubt this would be of any benefit.        Prophylaxis  on lovenox    Disposition  Physical therapy and palliative care to see today - likely TCU tomorrow with goal of returning to  assisted living if improves but patient and family aware that this is unlikely.              Interval History:   No new concerns.  Patient has a bit more energy this AM.  No pain.  No dyspnea, no fever or chills.  Thinks she's a bit stronger today but hasn't been up without lift yet.             Review of Systems:    ROS: 10 point ROS neg other than the symptoms noted above in the HPI.             Medications:   Current active medications and PTA medications reviewed, see medication list for details.            Physical Exam:   Vitals were reviewed  Patient Vitals for the past 24 hrs:   BP Temp Temp src Pulse Heart Rate Resp SpO2   17 0805 156/63 97.7  F (36.5  C) Oral 54 - 16 93 %   17 2300 164/56 97.5  F (36.4  C) Oral - 58 16 93 %   17 1902 147/54 97.9  F (36.6  C) Oral - 69 18 93 %   17 1530 152/48 98.4  F (36.9  C) Oral - 57 16 95 %   17 1230 156/54 96.7  F (35.9  C) Oral - 72 - 95 %   17 0907 143/56 - - - 60 - -       Temperatures:  Current - Temp: 97.7  F (36.5  C); Max - Temp  Av.6  F (36.4  C)  Min: 96.7  F (35.9  C)  Max: 98.4  F (36.9  C)  Respiration range: Resp  Av.5  Min: 16  Max: 18  Pulse range: Pulse  Av  Min: 54  Max: 54  Blood pressure range: Systolic (24hrs), Av , Min:143 , Max:164   ; Diastolic (24hrs), Av, Min:48, Max:63    Pulse oximetry range: SpO2  Av.8 %  Min: 93 %  Max: 95 %  I/O last 3 completed shifts:  In: 320 [P.O.:320]  Out: -     Intake/Output Summary (Last 24 hours) at 17 0846  Last data filed at 17 1844   Gross per 24 hour   Intake              320 ml   Output                0 ml   Net              320 ml     EXAM:  General: awake and alert, NAD, oriented x 3  Head: normocephalic  Neck: unremarkable, no lymphadenopathy   HEENT: oropharynx pink and moist    Heart: Regular rate and rhythm, no murmurs, rubs, or gallops  Lungs: clear to auscultation bilaterally with good air movement throughout  Abdomen: soft,  non-tender, no masses or organomegaly  Extremities: no edema in lower extremities   Skin unremarkable.               Data:     Results for orders placed or performed during the hospital encounter of 05/26/17 (from the past 24 hour(s))   CBC with platelets differential   Result Value Ref Range    WBC 6.9 4.0 - 11.0 10e9/L    RBC Count 4.30 3.8 - 5.2 10e12/L    Hemoglobin 13.4 11.7 - 15.7 g/dL    Hematocrit 40.7 35.0 - 47.0 %    MCV 95 78 - 100 fl    MCH 31.2 26.5 - 33.0 pg    MCHC 32.9 31.5 - 36.5 g/dL    RDW 14.0 10.0 - 15.0 %    Platelet Count 224 150 - 450 10e9/L    Diff Method Automated Method     % Neutrophils 58.4 %    % Lymphocytes 25.4 %    % Monocytes 11.5 %    % Eosinophils 3.8 %    % Basophils 0.6 %    % Immature Granulocytes 0.3 %    Absolute Neutrophil 4.1 1.6 - 8.3 10e9/L    Absolute Lymphocytes 1.8 0.8 - 5.3 10e9/L    Absolute Monocytes 0.8 0.0 - 1.3 10e9/L    Absolute Eosinophils 0.3 0.0 - 0.7 10e9/L    Absolute Basophils 0.0 0.0 - 0.2 10e9/L    Abs Immature Granulocytes 0.0 0 - 0.4 10e9/L   Basic metabolic panel   Result Value Ref Range    Sodium 143 133 - 144 mmol/L    Potassium 3.9 3.4 - 5.3 mmol/L    Chloride 110 (H) 94 - 109 mmol/L    Carbon Dioxide 24 20 - 32 mmol/L    Anion Gap 9 3 - 14 mmol/L    Glucose 92 70 - 99 mg/dL    Urea Nitrogen 17 7 - 30 mg/dL    Creatinine 0.67 0.52 - 1.04 mg/dL    GFR Estimate 80 >60 mL/min/1.7m2    GFR Estimate If Black >90   GFR Calc   >60 mL/min/1.7m2    Calcium 8.6 8.5 - 10.1 mg/dL           Attestation:  I have reviewed today's vital signs, notes, medications, labs and imaging.  Amount of time performed on this daily note: 25 minutes.     Donn Bentley MD, MD

## 2017-05-31 NOTE — CONSULTS
"Palliative Care Consultation Note   Date of Service:5/31/2017  Admission Date/Time: 5/26/2017  Primary Care Provider: Devi May  Consulting Provider: Magui Bentley, has asked us to see Angela Esposito to help with goals of care.    I met with the patient and her son, daughter and daughter in law.  Primary palliative care diagnosis:  . Fall with history of unsteady gaits, suspected new subacute stroke   Consulted with hospitalist, RN    SUMMARY/HPI: Angela Esposito is a 89 year old woman admitted with multiple falls. She has longstanding history of unsteady gait, but with marked worsening in recent days. No clear lab abnormalities, no noted arrhythmia. Cause was suspected to be subacute stroke, but MRI was declined by the family given Angela's advanced age and goals of care being primarily comfort. This would not change the course of treatment.     She lives in assisted living and has also had a history of nighttime confusion and hallucinations. This is also worsening. Last night she was quite agitated, was given a dose of Zyprexa which was effective, but has been more sleepy today. She has intermittently complained of some mild headache and sore neck, relieved with acetaminophen. She previously had been ambulatory, now needing 2 people to transfer.         ASSESSMENT, RECOMMENDATIONS, AND PLAN:  1. Symptom Management    Pain, mild headache -- using acetaminophen. Suggest scheduling this at the NH    Agitation/delirium -- Zyprexa effective,  dose reduced to 1.25 mg    2. Goals of Care  -Angela has dementia which limits her ability to participate in goals of care conversation, but she does note that at her age, she has lived \"longer than I cared to.\"   -Her family are all in agreement that the goal of care is not life prolongation, but rather comfort, well being, safety. The are familiar with hospice and are requesting hospice services in the long term care facility (sounds like " [facility] tomorrow). Referral has been placed.       3. ACP    -Current code status is DNR/DNI. Discussion of code status -confirmed.  - She is NOT capable of making medical decisions on her own behalf.    4. Psychosocial/Emotional/Spiritual support  . One son and one daughter both live locally, both very involved and supportive. She is one of 17 children. [place] farm. Recently living at [facility]. Michelle, enjoys phone calls from her . 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     5. Prognosis  - Mrs Esposito is medically appropriate for hospice if she pursues symptom focused care.     Palliative performance scale: 30%  Ambulation-TOTALLY BEDBOUND; Activity-UNABLE TO DO ANY ACTIVITY; Evidence of Disease-EXTENSIVE DISEASE; Self care-TOTAL CARE; Intake-NORMAL or REDUCED; Conscious Level-FULL or DROWSY +/- CONFUSION  -Palliative Performance Scale ( 0=death, 100= full function)   Domains of PPS = ambulation, activity level, evidence of disease, self-care, intake, level of consciousness.    Thank you for allowing me to participate in the care of Angela Esposito.  Palliative Care will continue to follow.      ALICE Bentley-[facility]  pager [phone]    REVIEW OF SYSTEMS:   Pain: mild headache, relieved w/ acetaminophne. Stiff neck, also mild  Dyspnea: no  Nausea: no  Anorexia: no  Constipation: no  Delirium: yes. Hallucinations/deliusions sometimes very upsetting to her. Ongoing for years, worsening  Insomnia: no  Depression: no  Anxiety: no    PAST MEDICAL HISTORY:   Patient Active Problem List   Diagnosis     Disorder of bone and cartilage     Diverticulosis of large intestine     Essential hypertension     Polyneuropathy in other diseases classified elsewhere (H)     Personal history of benign neoplasm of brain     Generalized osteoarthrosis, unspecified site     Personal history of fall     Fear     DDD  "(degenerative disc disease), lumbar     Simple dental caries     Benign tumor of brain (H)     Sensorineural hearing loss, bilateral     Vertigo     CARDIOVASCULAR SCREENING; LDL GOAL LESS THAN 130     Lipid disorder     Macular degeneration     Retinal artery occlusion     Vision loss     Advanced directives, counseling/discussion     GERD (gastroesophageal reflux disease)     Pain in shoulder     At risk for falling     Glaucoma     Unsteady gait     Fall     Falls       PAST SURGICAL HISTORY:   Past Surgical History:   Procedure Laterality Date     C VAGINAL HYSTERECTOMY      Hysterectomy, Vaginal     CATARACT IOL, RT/LT      Cataract IOL RT     CATARACT IOL, RT/LT  7/98    Cataract IOL LT     CL AFF SURGICAL PATHOLOGY      leiomyoma  One ovary remains     COLONOSCOPY  2002    neg     SURGICAL HISTORY OF -   09/2004    Radiation Therapy Brain Tumor-Slow growing tumor and not malignant as per patient       FAMILY HISTORY:  family history includes Breast Cancer in her sister; CANCER in her brother, father, mother, and sister; Prostate Cancer in her brother.  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 No     Social History Narrative       ALLERGIES:   Allergies   Allergen Reactions     Sulfa Drugs Unknown     CURRENT MEDICATIONS:  No current outpatient prescriptions on file.     PHYSICAL EXAM:  /51 (BP Location: Left arm)  Pulse 59  Temp 98.3  F (36.8  C) (Oral)  Resp 16  Ht 5' 1\" (1.549 m)  Wt 126 lb 5.2 oz (57.3 kg)  SpO2 90%  BMI 23.87 kg/m2  EXAM:  EXAM:  Constitutional: alert pleasant no distress  NEURO: moves " all extremities, speech is clear, oriented to self and 'hospital' but is confused about people and events        LABS:  Albumin (g/dL)   Date Value   05/27/2017 3.4     No results found for: CREATININE  Urea Nitrogen (mg/dL)   Date Value   05/30/2017 17     Hemoglobin (g/dL)   Date Value   05/30/2017 13.4     Potassium (mmol/L)   Date Value   05/30/2017 3.9     No components found for: SODIUM      Total time spent on this visit: 111 minutes

## 2017-05-31 NOTE — PLAN OF CARE
"Problem: Fall Risk (Adult)  Goal: Absence of Falls  Patient will demonstrate the desired outcomes by discharge/transition of care.   Outcome: No Change  /55 (BP Location: Left arm)  Pulse 69  Temp 98.2  F (36.8  C) (Oral)  Resp 16  Ht 1.549 m (5' 1\")  Wt 57.3 kg (126 lb 5.2 oz)  SpO2 91%  BMI 23.87 kg/m2     Pt slept well overnight. Denies any complaints with interactions. Pt denies dizziness when resting in bed. Pt denies pain. Pt incontinent x1 and was changed as she continues to decline getting up to toilet despite staff offers. High fall risk interventions in place. Will continue to observe.      "

## 2017-05-31 NOTE — PLAN OF CARE
Problem: Goal Outcome Summary  Goal: Goal Outcome Summary  Outcome: No Change  Pt very lethargic today.  Did take am medications with pudding and a few bites of breakfast and lunch, required assistance with feeding.  Unable to assist with transfers, used ceiling lift.Pt has been cooperative with cares, no delusions.    Palliative Care to see today.
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
## 2017-05-31 NOTE — PROGRESS NOTES
"SUBJECTIVE:   Was able to get around her appt in [place] and get to BR, eat, get dressed  Now unable to walk without assist of 2  Very sleepy today from zyprexa  Has history of significant delusions, mostly at night for a while, some are tormenting.       ROS:4 point ROS including Respiratory, CV, GI and , other than that noted in the HPI,  is negative     OBJECTIVE:   /56 (BP Location: Left arm)  Pulse 61  Temp 98.6  F (37  C) (Oral)  Resp 16  Ht 1.549 m (5' 1\")  Wt 57.3 kg (126 lb 5.2 oz)  SpO2 92%  BMI 23.87 kg/m2    GENERAL APPEARANCE:  Sedated, will talk, some confusion      RESP:clear      CV: regular rate and rhythm,  No  murmur , edema: none       Abdomen: soft, nontender, no liver or spleen enlargement, no masses, BSs normal   Skin: no cyanosis, pallor, or jaundice  Neuro: Cranial nerves  are normal. DORON. EOM's intact. . Neck supple. No bruits. Normal deep tendon reflexes.   Normal strength, sensation and rapid alternating movements in the upper and lower extremities.    CMP  Recent Labs  Lab 05/30/17  0640 05/28/17  0645 05/27/17  0900 05/26/17  2140    142 144 145*   POTASSIUM 3.9 3.8 3.8 4.0   CHLORIDE 110* 110* 110* 109   CO2 24 23 25 25   ANIONGAP 9 9 9 11   GLC 92 97 84 131*   BUN 17 10 10 15   CR 0.67 0.61 0.62 0.80   GFRESTIMATED 80 >90Non  GFR Calc 89 66   GFRESTBLACK >90African American GFR Calc >90African American GFR Calc >90African American GFR Calc 80   MINOR 8.6 8.8 8.7 8.8   PROTTOTAL  --   --  6.5*  --    ALBUMIN  --   --  3.4  --    BILITOTAL  --   --  0.7  --    ALKPHOS  --   --  118  --    AST  --   --  20  --    ALT  --   --  16  --      CBC  Recent Labs  Lab 05/30/17  0640 05/28/17  0645 05/27/17  0900 05/26/17  2140   WBC 6.9 7.4 6.9 6.0   RBC 4.30 4.73 4.71 4.09   HGB 13.4 14.6 14.8 12.6   HCT 40.7 44.8 44.7 39.2   MCV 95 95 95 96   MCH 31.2 30.9 31.4 30.8   MCHC 32.9 32.6 33.1 32.1   RDW 14.0 13.8 13.7 13.8    231 220 211     INRNo lab results " found in last 7 days.  Arterial BloodGas  No lab results found in last 7 days.   Venous Blood Gas  No lab results found in last 7 days.    Medications     OLANZapine  1.25 mg Oral Daily at 8 pm     latanoprost  1 drop Right Eye At Bedtime     prednisoLONE acetate  1 drop Right Eye Q3 Days     losartan  50 mg Oral Daily     omeprazole  20 mg Oral Daily     enoxaparin  30 mg Subcutaneous Q24H       Intake/Output Summary (Last 24 hours) at 05/31/17 1145  Last data filed at 05/31/17 0930   Gross per 24 hour   Intake              120 ml   Output                0 ml   Net              120 ml         ASSESSMENT: PLAN:   Falls with longstanding history of Unsteady gait - suspect subacute stroke  5/27/2017 -- long-standing problem but appears acutely worse past few days. No clear lab abnormalities on admission.  Evaluating chest pain as below, but doubt MI or arrythmia as cause but will place on telemetry overnight.  PE possibility but again unlikely.  Doubt seizures as discussed below.  Has a history of vertigo in the past and reports continued dizziness with ambulation - will try some meclizine as needed, physical therapy and occupational therapy consulted.  May need increased services from current memory care.    5/28/2017 -- CT negative for PE, UA and chest x-ray unremarkable.  Continue with physical therapy and with meclizine as needed.  May need placement as below.    5/29/2017 -- no improvement - suspect CVA - discussed with patient and family - could do MRI tomorrow to confirm, but regardless this does not appear reversible.  May improve but a fair chance that it does not.  They would like to hold off on MRI and pursue TCU in the next couple of days.  If they change their mind will order MRI for tomorrow.   5/30/2017 -- plan remains to forego MRI and manage conservatively.  Patient would still like to return to assisted living if able - appears a bit better today - will have physical therapy see her today, discuss  options with family and have palliative care consult this afternoon.  Most likely plan from what patient and family have said so far would be TCU tomorrow with hope for return to assisted living but more likely outcome being needing long-term care, however discharge directly to hospice would also be very reasonable here.    5/31/2017 was able to walk and get to BR and food from refrig and now can't do much of anything without assist.  No toxic, metabolic or infectious cause found.  CNS event suspected as above.  Family interested in LTC with hospice    Alzheimer's disease with sundowning.   -has been going on for a while but now worse last adelia.  Can be tormenting dilusions.  Will try some low dose zyprexa at 8 PM.  .        Chest pain  5/27/2017 -- unclear timing on this - suspect most likely musculoskeletal but with increased falls without clear cause will check troponin and ECG to help with determining possible etiology and with prognosis although no intervention planned beyond likely starting aspirin if either shows clear acute change, otherwise in context of falls will hold off on adding aspirin for now.  Also checking d-dimer as above.   Giving GI cocktail, checking chest x-ray.    5/28/2017 -- resolved and has not recurred - unclear if GI cocktail helped or not.  Does not look cardiac. No further evaluation at this point.    5/30/2017 -- no further issues.          GERD (gastroesophageal reflux disease) with hiatal hernia   5/27/2017 -- continuing home omeprazole, giving GI cocktail x 1 as patient thinks this could be causing her chest pain as above.    5/28/2017 -- no symptoms today.  Continue omeprazole.  Hiatal hernia seen on CT.    5/30/2017 -- no further issues.        Essential hypertension   5/28/2017 -- blood pressure fluctauting but always high and sometimes markedly elevated -  increase losartan to 50 from 25 mg   5/30/2017 -- doing well since increasing cozaar.       History of Benign tumor of brain  (H)  5/27/2017 -- no new changes seen on head CT.  She was apparently on keppra in the past for seizure prophylaxis, but isn't currently.  Seizures are a possible cause of her falls, but doesn't sounds clearly like that.  given no new changes on imaging and no symptoms clearly pointing to seizures will not restart this at present.          Macular degeneration/glaucoma  5/27/2017 -- patient is legally blind - staff to announce and identify themselves on entering room.  Awaiting dosing of latanoprost and prednisolone eye drops.   5/29/2017 -- restarted home drops.        Lung nodules  5/28/2017 -- seen on chest CT - patient denies smoking in the past but unclear - if she had recommendation would be for repeat imaging in 12 months -regardless in her case I doubt this would be of any benefit.        Prophylaxis  on lovenox     Disposition  Physical therapy and palliative care to see today - likely LTC with hospice.

## 2017-05-31 NOTE — PROGRESS NOTES
05/31/17 1500   Signing Clinician's Name / Credentials   Signing clinician's name / credentials ConnieSugg PT   Additional Documentation   Rehab Comments cancel- schedule, deciding on goals of care; will check on pt 6/1

## 2017-05-31 NOTE — PROGRESS NOTES
Reason for Follow up: DC Planning    Anticipated discharge needs: Discussed with pts dtr and son dc planning. All in agreement for LTC placement, [facility] has NO beds available. [facility] (Phone: [phone]) Fax: ([phone]) does have a bed available in the LTC [facility]. All in agreement for this. Palliative care consult this afternoon to further explore goals of care.     Next steps: DC to SNF tomorrow?    Swapna DE LEON, [facility], [facility] [phone]

## 2017-06-01 NOTE — DISCHARGE SUMMARY
[facility] Discharge Summary    Angela sEposito MRN# [id number]   Age: 89 year old YOB: 1916     Date of Admission:  5/26/2017  Date of Discharge::  6/1/2017 12:15 PM  Admitting Physician:  Donn Bentley MD  Discharge Physician:  Darin Bliss MD  Primary Physician: Devi May       Home clinic: [facility]               Discharge Diagnosis:   Principle diagnosis: weakness and confusion due to subacute stroke (ischemic) due to cerebrovascular disease     Secondary diagnoses:  Alzheimer's disease with delusions without behavioral disturbance  Macular degeneration   Lung nodules  HTN  GERD         Discharge Instructions:   Hospice consult   Olanzapine 1.25 mg at 8 PM for disturbing delusions       Follow up with primary care provider in 7 days        Procedures:       Results for orders placed or performed during the hospital encounter of 05/26/17   CT Head w/o Contrast    Narrative    CT HEAD W/O CONTRAST   5/26/2017 8:48 PM     HISTORY: falls    TECHNIQUE: Axial images of the head without IV contrast material.  Radiation dose for this scan was reduced using automated exposure  control, adjustment of the mA and/or kV according to patient size, or  iterative reconstruction technique.    COMPARISON: MR scan dated to/17/2014    FINDINGS: Again noted is a partially calcified and partially cystic  mass in the inferior frontal region. This corresponds to the enhancing  mass seen on the previous MR scan. This probably is due to a  meningioma. It has not changed significantly in size since the  previous scan. There is generalized atrophy of the brain.  Areas of  low attenuation are present in the white matter of the cerebral  hemispheres that are consistent with small vessel ischemic disease in  this age patient. There is no evidence of intracranial hemorrhage,  mass, acute infarct or anomaly. The visualized portions of the sinuses  and mastoids appear normal.  No intracranial bleed or skull fractures  are identified..      Impression    IMPRESSION:   1. No intracranial bleed. No skull fractures.  2. Atrophy of the brain.  White matter changes consistent with small  vessel ischemic disease.   3. No significant change in the partially calcified and partially  cystic subfrontal mass. This is consistent with a meningioma.    MITESH ZAPIEN MD   Cervical spine CT w/o contrast    Narrative    CT CERVICAL SPINE W/O CONTRAST 5/26/2017 8:50 PM     HISTORY:  fall, neck pain     TECHNIQUE:  Axial images of the cervical spine were obtained without  intravenous contrast. Coronal and sagittal reformations were  performed.  Radiation dose for this scan was reduced using automated  exposure control, adjustment of the mA and/or kV according to patient  size, or iterative reconstruction technique.    COMPARISON: None.    FINDINGS: There are 7  cervical type vertebrae used for the purpose of  this dictation. The alignment of the cervical spine is normal.   No  cervical spine fracture identified.    C1-C2:  Normal alignment.     C2-C3:  Normal disc. Severe degenerative change in the facet joints.     C3-C4:  Loss of disc space height. Central canal normal. Degenerative  change in the facet joints.     C4-C5:  Loss of disc space height. Central canal normal. Degenerative  change in the facet joints.      C5-C6:  Loss of disc space height. Central canal normal.     C6-C7:  Loss of disc space height. Central canal normal.    C7-T1:  Loss of disc space height. Central canal normal      Impression    IMPRESSION:   1. No fractures.  2. Multilevel degenerative change.    MITESH ZAPIEN MD   Knee XR, 2 view, right    Narrative    XR KNEE RT 1 /2 VW  5/26/2017 8:58 PM     HISTORY:  fall, pain, no swelling or erythema    COMPARISON: Film dated 4/7/2016.    FINDINGS:  Mild chondrocalcinosis. No fractures. Small joint effusion  appears to be present. Vascular calcifications.      Impression    IMPRESSION: No  fractures are identified.    MITESH ZAPIEN MD   Chest 2 Views*    Narrative    CHEST TWO VIEWS    5/27/2017 9:40 AM     HISTORY: Left-sided chest pain, weakness, falls.    COMPARISON: 7/8/2016.    FINDINGS: Cardiac and mediastinal silhouettes are within normal  limits. Minimal linear atelectasis versus scarring in the lung bases  is unchanged. No focal areas of consolidation. No pneumothorax or  pleural effusion. No acute osseous abnormality. Degenerative changes  are seen at the acromioclavicular joint spaces bilaterally.      Impression    IMPRESSION: Scarring versus atelectasis in the lung bases is  unchanged.    ISSA RUVALCABA, DO   CT Chest pulmonary embolism w contrast    Narrative    CT CHEST PULMONARY EMBOLISM WITH CONTRAST  5/27/2017 4:41 PM    HISTORY:   Possible PE, elevated D-dimer, left lateral chest pain with  falls, question chest wall injury.    TECHNIQUE: Scans obtained from the apices through the diaphragm with  IV contrast. 65 mL Isovue 370 injected.  Radiation dose for this scan was reduced using automated exposure  control, adjustment of the mA and/or kV according to patient size, or  iterative reconstruction technique.    COMPARISON:  Chest x-rays dated 5/27/2017.    FINDINGS:  Two tiny nodules measuring less than 0.3 cm are seen in the  left upper lung lobe (image 40 series 5 and image 37 series 5).  Atelectasis in the posterior lung bases bilaterally, lungs are  otherwise grossly clear.    The heart is upper normal size. There is a small hiatal hernia  containing stomach. There is nonaneurysmal atherosclerosis of the  aorta without evidence for dissection.    Other than the aortic atherosclerosis, probable cyst in the right  kidney and the hiatal hernia, the visualized portions of the upper  abdominal contents are grossly unremarkable.  Degenerative changes are  seen in the spine. No aggressive osseous lesions are noted. Chronic,  healed, left lateral third and fourth rib fractures are noted.  No  acute fracture seen.    The central to third order pulmonary arteries demonstrate no filling  defects to suggest pulmonary artery embolism.      Impression    IMPRESSION:   1.  No evidence for pulmonary artery embolism.  2.  Two tiny nodules measuring less than 0.3 cm are seen in the left  upper lung lobe. For an indeterminate lung nodule < or equal to 4 mm :  Low risk patients: No follow-up needed.  High risk patients: Follow-up CT at 12 months; if unchanged, no  further follow-up.    - Low Risk: Minimal or absent history of smoking and of other known  risk factors.  - Nonsolid (ground glass) or partly solid nodules may require longer  follow-up to exclude indolent adenocarcinoma.  - Fleischner Society Recommendations, Radiology 2005.    3. Small hiatal hernia contains the stomach.  4. Nonaneurysmal atherosclerosis.   5. No pulmonary infiltrate. No acute rib fracture.     LEONELA MARTINS MD                    Allergies:      Allergies   Allergen Reactions     Sulfa Drugs Unknown                  Discharge Medications:     Current Discharge Medication List      START taking these medications    Details   OLANZapine (ZYPREXA) 2.5 MG tablet Take 0.5 tablets (1.25 mg) by mouth daily  Qty: 60 tablet, Refills: 0    Comments: At 8 PM  Associated Diagnoses: Dementia, senile with delusions, without behavioral disturbance (H)         CONTINUE these medications which have NOT CHANGED    Details   multivitamin, therapeutic with minerals (MULTI-VITAMIN) TABS tablet Take 1 tablet by mouth daily Tab-A-Burak  Qty: 90 tablet, Refills: 3    Associated Diagnoses: Encounter for herb and vitamin supplement management      latanoprost (XALATAN) 0.005 % ophthalmic solution Place 1 drop into the right eye At Bedtime       Cyanocobalamin (VITAMIN B-12 PO) Take 1 tablet by mouth daily      CRANBERRY PO Take 1 capsule by mouth daily      ketoconazole (NIZORAL) 2 % shampoo Apply to the affected area and wash off after 5 minutes.  Qty: 120  mL, Refills: 1    Associated Diagnoses: Seborrheic dermatitis of scalp      order for DME Equipment being ordered: Wheelchair  Qty: 1 Units, Refills: 0    Associated Diagnoses: Falls frequently; DDD (degenerative disc disease), lumbar      omeprazole 20 MG tablet Take one capsule by mouth once daily 30-60 minutes before a meal.  Qty: 90 tablet, Refills: 1    Associated Diagnoses: GERD (gastroesophageal reflux disease)      prednisoLONE acetate (PRED FORTE) 1 % ophthalmic suspension Place 1 drop into the right eye every 3 days       Glucosamine-Chondroit-Vit C-Mn (GLUCOSAMINE CHONDROITIN COMPLX) TABS Take 1 tablet by mouth 3 times daily.      TYLENOL CAPS 500 MG OR 1 CAPSULE by mouth three times daily         STOP taking these medications       losartan (COZAAR) 25 MG tablet Comments:   Reason for Stopping:         CALCIUM-VITAMIN D PO Comments:   Reason for Stopping:                     Consultations:   None           Brief History of Presenting Illness:   This patient is a 89 year old  female with a significant past medical history of dementia, benign brain tumor, hypertension, GERD, macular degeneration/glaucoma with legal blindness, and prior falls living in memory care who presents with increase in falls.  She fell twice yesterday, leading to her presentation in the ER last night and says she has fallen 4 times in the past 2 days.  Has had multiple falls prior to that, but unclear exactly how often, sounds like certainly not this frequently.  She reportedly has hit her head multiple times, but doesn't think any loss of consciousness.  On presentation to the ER she was complaining of focal pain in the left knee and denies chest pain or dyspnea.  Has not had headache or pain in the head last night or this AM.  Did say neck was mildly sore last night.  She does tell me that she's had some chest pain, although denied this in the ER last night -hard to tease out the timing of the pain - says she had it  "yesterday, then resolved with tylenol, then back overnight.  Currently sore in left mid lateral chest, worse with movement.  Thinks this may be her heartburn as that has been giving her worse symptoms \"for a while\" she says.  Denies dyspnea.  Some radiation to left arm, no palpitations, chest heaviness, pressure or tightness.  She does say she feels somewhat \"achy all over\" today, but no focal areas of pain today other than the chest pain.  Says she still feels dizzy with spinning sensation when she was up overnight.  No vision changes (legally blind).  No changes in speech or swallowing.  Denies any loss of bowel or bladder control with falls and there wasn't any report of this.  Mental status appears at baseline - some inconsistency in reported history, but clear and coherent and oriented and responding to questions about current symptoms consistently.            Hospital Course:   Falls with longstanding history of Unsteady gait - suspect subacute stroke due to cerbrovascular disease   5/27/2017 -- long-standing problem but appears acutely worse past few days. No clear lab abnormalities on admission.  Evaluating chest pain as below, but doubt MI or arrythmia as cause but will place on telemetry overnight.  PE possibility but again unlikely.  Doubt seizures as discussed below.  Has a history of vertigo in the past and reports continued dizziness with ambulation - will try some meclizine as needed, physical therapy and occupational therapy consulted.  May need increased services from current memory care.    5/28/2017 -- CT negative for PE, UA and chest x-ray unremarkable.  Continue with physical therapy and with meclizine as needed.  May need placement as below.    5/29/2017 -- no improvement - suspect CVA - discussed with patient and family - could do MRI tomorrow to confirm, but regardless this does not appear reversible.  May improve but a fair chance that it does not.  They would like to hold off on MRI and " pursue TCU in the next couple of days.  If they change their mind will order MRI for tomorrow.   5/30/2017 -- plan remains to forego MRI and manage conservatively.  Patient would still like to return to assisted living if able - appears a bit better today - will have physical therapy see her today, discuss options with family and have palliative care consult this afternoon.  Most likely plan from what patient and family have said so far would be TCU tomorrow with hope for return to assisted living but more likely outcome being needing long-term care, however discharge directly to hospice would also be very reasonable here.    5/31/2017 was able to walk and get to BR and food from [employer] and now can't do much of anything without assist.  No toxic, metabolic or infectious cause found.  CNS event suspected as above.  Family interested in LTC with hospice     Alzheimer's disease with sundowning.   -has been going on for a while but now worse last adelia.  Can be tormenting dilusions.  Will try some low dose zyprexa at 8 PM.  .        Chest pain  5/27/2017 -- unclear timing on this - suspect most likely musculoskeletal but with increased falls without clear cause will check troponin and ECG to help with determining possible etiology and with prognosis although no intervention planned beyond likely starting aspirin if either shows clear acute change, otherwise in context of falls will hold off on adding aspirin for now.  Also checking d-dimer as above.   Giving GI cocktail, checking chest x-ray.    5/28/2017 -- resolved and has not recurred - unclear if GI cocktail helped or not.  Does not look cardiac. No further evaluation at this point.    5/30/2017 -- no further issues.          GERD (gastroesophageal reflux disease) with hiatal hernia   5/27/2017 -- continuing home omeprazole, giving GI cocktail x 1 as patient thinks this could be causing her chest pain as above.    5/28/2017 -- no symptoms today.  Continue omeprazole.  " Hiatal hernia seen on CT.    5/30/2017 -- no further issues.        Essential hypertension   5/28/2017 -- blood pressure fluctauting but always high and sometimes markedly elevated -  increase losartan to 50 from 25 mg   5/30/2017 -- doing well since increasing cozaar.        History of Benign tumor of brain (H)  5/27/2017 -- no new changes seen on head CT.  She was apparently on keppra in the past for seizure prophylaxis, but isn't currently.  Seizures are a possible cause of her falls, but doesn't sounds clearly like that.  given no new changes on imaging and no symptoms clearly pointing to seizures will not restart this at present.          Macular degeneration/glaucoma  5/27/2017 -- patient is legally blind - staff to announce and identify themselves on entering room.  Awaiting dosing of latanoprost and prednisolone eye drops.   5/29/2017 -- restarted home drops.        Lung nodules  5/28/2017 -- seen on chest CT - patient denies smoking in the past but unclear - if she had recommendation would be for repeat imaging in 12 months -regardless in her case I doubt this would be of any benefit.        Prophylaxis  on lovenox      Disposition  Physical therapy and palliative care to see --> LTC with hospice.                   Discharge Exam:   OBJECTIVE:   /56 (BP Location: Right arm)  Pulse 59  Temp 98  F (36.7  C) (Oral)  Resp 18  Ht 1.549 m (5' 1\")  Wt 57.3 kg (126 lb 5.2 oz)  SpO2 90%  BMI 23.87 kg/m2    GENERAL APPEARANCE:  Alert, NAD, Ox2, can't see anything left eye, right eye tends to ignore the left visual field.       RESP:clear      CV: regular rates and rhythm,no murmur, no click or rub - no edema     Abdomen: soft, nontender, no liver or spleen enlargement, no masses, BSs normal   Skin: no cyanosis, pallor, or jaundice   Neuro: Cranial nerves  are normal. right pupil reacts, left doesn't. EOM's intact but tents to look to the right . . Neck supple. No bruits. Normal but decreased deep tendon " reflexes.   Normal strength, sensation and rapid alternating movements in the upper and lower extremities but slow and diffusely weak.  .            Pending Tests at Discharge:     Unresulted Labs Ordered in the Past 30 Days of this Admission     No orders found from 3/27/2017 to 5/27/2017.                   Discharge Disposition:   Discharged to long-term care facility      Attestation:  Amount of time performed on this discharge : 45 minutes.    Darin Bliss MD

## 2017-06-01 NOTE — PROGRESS NOTES
[facility] DISCHARGE NOTE    Patient discharged to long term care facility at 12:19 PM via cart by ambulance. Accompanied by daughter and staff. Discharge instructions reviewed with patient and daughter, opportunity offered to ask questions. Prescriptions sent with patient to fill . All belongings sent with patient.    Geo Walker

## 2017-06-01 NOTE — PROGRESS NOTES
Name: Angela Esposito    MRN#: [id number]    Reason for Hospitalization: Unsteady gait [R26.81]  Falls frequently [R29.6]    Discharge Date: 6/1/2017    Patient / Family response to discharge plan: Pt and family are in agreement for dc today to [facility] (Phone: [phone]) Fax: ([phone]) at 1200 via ambulance transport.  hospice to meet with pt at 1300    PAS-RR    Per [facility] regulation, CTS team completed and submitted PAS-RR to [place] [employer] Direct Connect via the [employer]. CTS team advised SNF and they are aware a PAS-RR has been submitted.     CTS team reviewed with pt or health care agent that they may be contacted for a follow up appointment within 10 days of hospital discharge if SNF stay is <30 days. Contact information for [employer] was also provided.     Pt or health care agent verbalized understanding.     PAS-RR # [id number]        Other Providers (Care Coordinator, County Services, PCA services etc): No    Future Appointments: No future appointments.    Discharge Disposition: long term care facility    Swapna DE LEON, [facility], [facility] [phone]

## 2017-06-01 NOTE — PLAN OF CARE
"Problem: Goal Outcome Summary  Goal: Goal Outcome Summary  Outcome: Improving  Pt was up in chair when this writer came on shift @ 1930. She was alert & pleasantly conversant w/ staff. Pt stated awareness of planned discharge tomorrow & asking \"Is the place I'm going to nice?\" Pt offered encouragement and assisted w/ getting ready for bed. Took her evening meds w/o problem, & assisted to bed w/ 2 assist & walker. She has been sleeping between cares and cooperative w/ cares tonight.      "

## 2017-06-01 NOTE — PLAN OF CARE
Problem: Goal Outcome Summary  Goal: Goal Outcome Summary  Physical Therapy Discharge Summary     Reason for therapy discharge:    Discharged to long term care facility.; discharging on hospice     Progress towards therapy goal(s). See goals on Care Plan in [facility] electronic health record for goal details.  Goals not met.  Barriers to achieving goals:   discharge from facility.     Therapy recommendation(s):    No further therapy is recommended.

## 2017-06-02 NOTE — PROGRESS NOTES
Clinic Care Coordination Contact  Care Team Conversations    RN CC received a CTS handoff. Pt discharged from [facility] on 6/1/17 and went into LTC [facility] at [facility]. No need for CC to outreach or f/u as pt is in long term care setting.     Joyce Ceballos RN, BSN, PHN   Clinic Care Coordinator  [facility]:  [place]   [email or web address]   Office: [phone] Fax: [phone]

## 2017-06-06 NOTE — TELEPHONE ENCOUNTER
Hospice Update-No Response Required  ADMISSION SUMMARY NOTE BY: Zunilda Durand, RN  PRIMARY PROVIDER: Dr. Theodore Bernabe  PRIMARY DX: cerebrovascular disease  CO-MORBIDITIES: dementia, chronic pain, degenerative disk disease  POLST: DNR/DNI  CURRENT STATUS: Pt is a 89 year old female kps of 40, falls risk of 9, and 7+ on FAST. Recent hospitalization after 4 falls in 2 days. Pt had to transition from AL to LTC d/t increased care needs. Pt previously able to ambulate but no longer able to d/t weakness and increasing cognitive impairment. Pt now requires extensive assist with all ADL's. Pt experiencing new reported hallucinations/delusions especially during HS. Pt hypotensive, BP meds d/c upon hospital discharge.  MED CHANGES: activate comfort kit  PATIENT GOALS: to be comfortable  FAMILY GOALS: to keep pt comfortable through EOL  SMART GOAL: To be free of falls  PLAN FOR NEXT 2 WEEKS: SNV twice weekly, MSW 1-3 times per month and PRN, HHA 2 times a week for 1 hour no preference, massage assess, PT assess for transfers/safety/fall risk prevention, no to , volunteer

## 2017-06-06 NOTE — PROGRESS NOTES
[place] [facility]  PRIMARY CARE PROVIDER AND CLINIC:  Devi May [facility] [address] [place] [address] / [facility] [address]*  Chief Complaint   Patient presents with     Hospital F/U       HPI:    Angela Esposito is a 89 year old  (12/9/1916),admitted to the [facility]  from [facility].  Hospital stay 5/26/17 through 6/1/17.  Admitted to this facility for  medical management, nursing care and hospice.  Current issues are:      Benign neoplasm of brain, unspecified brain region (H)  Head CT done with recent hospital stayy shows no new changes.  Previously on keppra for seizures r/t brain tumor now without.  No apparent sz.  Goal is to emphasize comfort with conservative treatment in NH  Family requests no diagnostic testing or treatment   Macular degeneration  Blind  Limits independence  Previously was able to live at [facility] with limited activity   Difficulty walking   Fall  Previously Ambulates independently with walker now basically w/c bound  Participated with therapy at hospital but no improvement.  Hx of recurring falls some with injury.    Knee pain  Worse now with new fall over w/e  On tylenol at present but may need to beef that up r/t long hx DJD pain  Hospice care patient       CODE STATUS/ADVANCE DIRECTIVES DISCUSSION:   DNR / DNI  Patient's living condition: lives in an assisted living facility    ALLERGIES:Sulfa drugs  PAST MEDICAL HISTORY:  has a past medical history of Benign neoplasm of colon; Benign tumor of brain (H); Disorder of bone and cartilage, unspecified; Displacement of lumbar intervertebral disc without myelopathy; Diverticulosis of colon (without mention of hemorrhage); Esophageal reflux; Irritable bowel syndrome; Spinal stenosis, unspecified region other than cervical; Unspecified essential hypertension; Unspecified glaucoma; and Unspecified hereditary and idiopathic peripheral neuropathy.  PAST SURGICAL HISTORY:   has a past surgical history that includes VAGINAL HYSTERECTOMY; SURGICAL PATHOLOGY; surgical history of - (09/2004); colonoscopy (2002); cataract iol, rt/lt; and cataract iol, rt/lt (7/98).  FAMILY HISTORY: family history includes Breast Cancer in her sister; CANCER in her brother, father, mother, and sister; Prostate Cancer in her brother.  SOCIAL HISTORY:  reports that she has never smoked. She has never used smokeless tobacco. She reports that she does not drink alcohol or use illicit drugs.    Post Discharge Medication Reconciliation Status: discharge medications reconciled and changed, per note/orders (see AVS).  Current Outpatient Prescriptions   Medication Sig Dispense Refill     OLANZapine (ZYPREXA) 2.5 MG tablet Take 0.5 tablets (1.25 mg) by mouth daily 60 tablet 0     Cyanocobalamin (VITAMIN B-12 PO) Take 1 tablet by mouth daily       CRANBERRY PO Take 1 capsule by mouth daily       multivitamin, therapeutic with minerals (MULTI-VITAMIN) TABS tablet Take 1 tablet by mouth daily Tab-A-Burak 90 tablet 3     ketoconazole (NIZORAL) 2 % shampoo Apply to the affected area and wash off after 5 minutes. 120 mL 1     order for DME Equipment being ordered: Wheelchair 1 Units 0     latanoprost (XALATAN) 0.005 % ophthalmic solution Place 1 drop into the right eye At Bedtime        omeprazole 20 MG tablet Take one capsule by mouth once daily 30-60 minutes before a meal. 90 tablet 1     prednisoLONE acetate (PRED FORTE) 1 % ophthalmic suspension Place 1 drop into the right eye every 3 days        Glucosamine-Chondroit-Vit C-Mn (GLUCOSAMINE CHONDROITIN COMPLX) TABS Take 1 tablet by mouth 3 times daily.       TYLENOL CAPS 500 MG OR 1 CAPSULE by mouth three times daily         ROS:  Unobtainable secondary to cognitive impairment or aphasia. and 10 point ROS of systems including Constitutional, Eyes, Respiratory, Cardiovascular, Gastroenterology, Genitourinary, Integumentary, Muscularskeletal, Psychiatric were all negative  "except for pertinent positives noted in my HPI.    Exam:  /80  Pulse 68  Temp 98.7  F (37.1  C)  Resp 16  Ht 5' 1\" (1.549 m)  Wt 122 lb (55.3 kg)  SpO2 92%  BMI 23.05 kg/m2  GENERAL APPEARANCE:  Alert, in no distress, cooperative  ENT:  Habematolel, oral mucous membranes moist  EYES:  blind  RESP:  lungs clear to auscultation , no respiratory distress, diminished breath sounds    CV:  regular rate and rhythm, no murmur, rub, or gallop, no edema  ABDOMEN:  bowel sounds normal, soft, non-tender  M/S:   Gait and station abnormal up in w/c  frail BAXTER   SKIN:  Pale w/d  PSYCH:  oriented X 3, insight and judgement impaired, memory impaired , affect and mood normal    Lab/Diagnostic data:     Results for orders placed or performed during the hospital encounter of 05/26/17   CT Head w/o Contrast    Narrative    CT HEAD W/O CONTRAST   5/26/2017 8:48 PM     HISTORY: falls    TECHNIQUE: Axial images of the head without IV contrast material.  Radiation dose for this scan was reduced using automated exposure  control, adjustment of the mA and/or kV according to patient size, or  iterative reconstruction technique.    COMPARISON: MR scan dated to/17/2014    FINDINGS: Again noted is a partially calcified and partially cystic  mass in the inferior frontal region. This corresponds to the enhancing  mass seen on the previous MR scan. This probably is due to a  meningioma. It has not changed significantly in size since the  previous scan. There is generalized atrophy of the brain.  Areas of  low attenuation are present in the white matter of the cerebral  hemispheres that are consistent with small vessel ischemic disease in  this age patient. There is no evidence of intracranial hemorrhage,  mass, acute infarct or anomaly. The visualized portions of the sinuses  and mastoids appear normal. No intracranial bleed or skull fractures  are identified..      Impression    IMPRESSION:   1. No intracranial bleed. No skull fractures.  2. " Atrophy of the brain.  White matter changes consistent with small  vessel ischemic disease.   3. No significant change in the partially calcified and partially  cystic subfrontal mass. This is consistent with a meningioma.    MITESH ZAPIEN MD   Cervical spine CT w/o contrast    Narrative    CT CERVICAL SPINE W/O CONTRAST 5/26/2017 8:50 PM     HISTORY:  fall, neck pain     TECHNIQUE:  Axial images of the cervical spine were obtained without  intravenous contrast. Coronal and sagittal reformations were  performed.  Radiation dose for this scan was reduced using automated  exposure control, adjustment of the mA and/or kV according to patient  size, or iterative reconstruction technique.    COMPARISON: None.    FINDINGS: There are 7  cervical type vertebrae used for the purpose of  this dictation. The alignment of the cervical spine is normal.   No  cervical spine fracture identified.    C1-C2:  Normal alignment.     C2-C3:  Normal disc. Severe degenerative change in the facet joints.     C3-C4:  Loss of disc space height. Central canal normal. Degenerative  change in the facet joints.     C4-C5:  Loss of disc space height. Central canal normal. Degenerative  change in the facet joints.      C5-C6:  Loss of disc space height. Central canal normal.     C6-C7:  Loss of disc space height. Central canal normal.    C7-T1:  Loss of disc space height. Central canal normal      Impression    IMPRESSION:   1. No fractures.  2. Multilevel degenerative change.    MITESH ZAPIEN MD   Knee XR, 2 view, right    Narrative    XR KNEE RT 1 /2 VW  5/26/2017 8:58 PM     HISTORY:  fall, pain, no swelling or erythema    COMPARISON: Film dated 4/7/2016.    FINDINGS:  Mild chondrocalcinosis. No fractures. Small joint effusion  appears to be present. Vascular calcifications.      Impression    IMPRESSION: No fractures are identified.    MITESH ZAPIEN MD   Chest 2 Views*    Narrative    CHEST TWO VIEWS    5/27/2017 9:40 AM     HISTORY: Left-sided chest  pain, weakness, falls.    COMPARISON: 7/8/2016.    FINDINGS: Cardiac and mediastinal silhouettes are within normal  limits. Minimal linear atelectasis versus scarring in the lung bases  is unchanged. No focal areas of consolidation. No pneumothorax or  pleural effusion. No acute osseous abnormality. Degenerative changes  are seen at the acromioclavicular joint spaces bilaterally.      Impression    IMPRESSION: Scarring versus atelectasis in the lung bases is  unchanged.    ISSA RUVALCABA, DO   CT Chest pulmonary embolism w contrast    Narrative    CT CHEST PULMONARY EMBOLISM WITH CONTRAST  5/27/2017 4:41 PM    HISTORY:   Possible PE, elevated D-dimer, left lateral chest pain with  falls, question chest wall injury.    TECHNIQUE: Scans obtained from the apices through the diaphragm with  IV contrast. 65 mL Isovue 370 injected.  Radiation dose for this scan was reduced using automated exposure  control, adjustment of the mA and/or kV according to patient size, or  iterative reconstruction technique.    COMPARISON:  Chest x-rays dated 5/27/2017.    FINDINGS:  Two tiny nodules measuring less than 0.3 cm are seen in the  left upper lung lobe (image 40 series 5 and image 37 series 5).  Atelectasis in the posterior lung bases bilaterally, lungs are  otherwise grossly clear.    The heart is upper normal size. There is a small hiatal hernia  containing stomach. There is nonaneurysmal atherosclerosis of the  aorta without evidence for dissection.    Other than the aortic atherosclerosis, probable cyst in the right  kidney and the hiatal hernia, the visualized portions of the upper  abdominal contents are grossly unremarkable.  Degenerative changes are  seen in the spine. No aggressive osseous lesions are noted. Chronic,  healed, left lateral third and fourth rib fractures are noted. No  acute fracture seen.    The central to third order pulmonary arteries demonstrate no filling  defects to suggest pulmonary artery  embolism.      Impression    IMPRESSION:   1.  No evidence for pulmonary artery embolism.  2.  Two tiny nodules measuring less than 0.3 cm are seen in the left  upper lung lobe. For an indeterminate lung nodule < or equal to 4 mm :  Low risk patients: No follow-up needed.  High risk patients: Follow-up CT at 12 months; if unchanged, no  further follow-up.    - Low Risk: Minimal or absent history of smoking and of other known  risk factors.  - Nonsolid (ground glass) or partly solid nodules may require longer  follow-up to exclude indolent adenocarcinoma.  - Fleischner Society Recommendations, Radiology 2005.    3. Small hiatal hernia contains the stomach.  4. Nonaneurysmal atherosclerosis.   5. No pulmonary infiltrate. No acute rib fracture.     LEONELA MARTINS MD   Basic metabolic panel   Result Value Ref Range    Sodium 145 (H) 133 - 144 mmol/L    Potassium 4.0 3.4 - 5.3 mmol/L    Chloride 109 94 - 109 mmol/L    Carbon Dioxide 25 20 - 32 mmol/L    Anion Gap 11 3 - 14 mmol/L    Glucose 131 (H) 70 - 99 mg/dL    Urea Nitrogen 15 7 - 30 mg/dL    Creatinine 0.80 0.52 - 1.04 mg/dL    GFR Estimate 66 >60 mL/min/1.7m2    GFR Estimate If Black 80 >60 mL/min/1.7m2    Calcium 8.8 8.5 - 10.1 mg/dL   CBC with platelets differential   Result Value Ref Range    WBC 6.0 4.0 - 11.0 10e9/L    RBC Count 4.09 3.8 - 5.2 10e12/L    Hemoglobin 12.6 11.7 - 15.7 g/dL    Hematocrit 39.2 35.0 - 47.0 %    MCV 96 78 - 100 fl    MCH 30.8 26.5 - 33.0 pg    MCHC 32.1 31.5 - 36.5 g/dL    RDW 13.8 10.0 - 15.0 %    Platelet Count 211 150 - 450 10e9/L    Diff Method Automated Method     % Neutrophils 55.7 %    % Lymphocytes 25.5 %    % Monocytes 14.9 %    % Eosinophils 2.7 %    % Basophils 1.0 %    % Immature Granulocytes 0.2 %    Absolute Neutrophil 3.3 1.6 - 8.3 10e9/L    Absolute Lymphocytes 1.5 0.8 - 5.3 10e9/L    Absolute Monocytes 0.9 0.0 - 1.3 10e9/L    Absolute Eosinophils 0.2 0.0 - 0.7 10e9/L    Absolute Basophils 0.1 0.0 - 0.2 10e9/L    Abs  Immature Granulocytes 0.0 0 - 0.4 10e9/L   CBC with platelets   Result Value Ref Range    WBC 6.9 4.0 - 11.0 10e9/L    RBC Count 4.71 3.8 - 5.2 10e12/L    Hemoglobin 14.8 11.7 - 15.7 g/dL    Hematocrit 44.7 35.0 - 47.0 %    MCV 95 78 - 100 fl    MCH 31.4 26.5 - 33.0 pg    MCHC 33.1 31.5 - 36.5 g/dL    RDW 13.7 10.0 - 15.0 %    Platelet Count 220 150 - 450 10e9/L   Comprehensive metabolic panel   Result Value Ref Range    Sodium 144 133 - 144 mmol/L    Potassium 3.8 3.4 - 5.3 mmol/L    Chloride 110 (H) 94 - 109 mmol/L    Carbon Dioxide 25 20 - 32 mmol/L    Anion Gap 9 3 - 14 mmol/L    Glucose 84 70 - 99 mg/dL    Urea Nitrogen 10 7 - 30 mg/dL    Creatinine 0.62 0.52 - 1.04 mg/dL    GFR Estimate 89 >60 mL/min/1.7m2    GFR Estimate If Black >90   GFR Calc   >60 mL/min/1.7m2    Calcium 8.7 8.5 - 10.1 mg/dL    Bilirubin Total 0.7 0.2 - 1.3 mg/dL    Albumin 3.4 3.4 - 5.0 g/dL    Protein Total 6.5 (L) 6.8 - 8.8 g/dL    Alkaline Phosphatase 118 40 - 150 U/L    ALT 16 0 - 50 U/L    AST 20 0 - 45 U/L   Troponin I   Result Value Ref Range    Troponin I ES  0.000 - 0.045 ug/L     <0.015  The 99th percentile for upper reference range is 0.045 ug/L.  Troponin values in   the range of 0.045 - 0.120 ug/L may be associated with risks of adverse   clinical events.     D dimer quantitative   Result Value Ref Range    D Dimer 0.7 (H) 0.0 - 0.50 ug/ml FEU   UA with Microscopic reflex to Culture   Result Value Ref Range    Color Urine Yellow     Appearance Urine Clear     Glucose Urine Negative NEG mg/dL    Bilirubin Urine Negative NEG    Ketones Urine Negative NEG mg/dL    Specific Gravity Urine 1.010 1.003 - 1.035    Blood Urine Negative NEG    pH Urine 8.0 (H) 5.0 - 7.0 pH    Protein Albumin Urine 30 (A) NEG mg/dL    Urobilinogen mg/dL Normal 0.0 - 2.0 mg/dL    Nitrite Urine Negative NEG    Leukocyte Esterase Urine Negative NEG    Source Midstream Urine     WBC Urine 1 0 - 2 /HPF    RBC Urine 1 0 - 2 /HPF    Squamous  Epithelial /HPF Urine <1 0 - 1 /HPF    Mucous Urine Present (A) NEG /LPF   CBC with platelets   Result Value Ref Range    WBC 7.4 4.0 - 11.0 10e9/L    RBC Count 4.73 3.8 - 5.2 10e12/L    Hemoglobin 14.6 11.7 - 15.7 g/dL    Hematocrit 44.8 35.0 - 47.0 %    MCV 95 78 - 100 fl    MCH 30.9 26.5 - 33.0 pg    MCHC 32.6 31.5 - 36.5 g/dL    RDW 13.8 10.0 - 15.0 %    Platelet Count 231 150 - 450 10e9/L   Basic metabolic panel   Result Value Ref Range    Sodium 142 133 - 144 mmol/L    Potassium 3.8 3.4 - 5.3 mmol/L    Chloride 110 (H) 94 - 109 mmol/L    Carbon Dioxide 23 20 - 32 mmol/L    Anion Gap 9 3 - 14 mmol/L    Glucose 97 70 - 99 mg/dL    Urea Nitrogen 10 7 - 30 mg/dL    Creatinine 0.61 0.52 - 1.04 mg/dL    GFR Estimate >90  Non  GFR Calc   >60 mL/min/1.7m2    GFR Estimate If Black >90   GFR Calc   >60 mL/min/1.7m2    Calcium 8.8 8.5 - 10.1 mg/dL   Troponin I   Result Value Ref Range    Troponin I ES  0.000 - 0.045 ug/L     <0.015  The 99th percentile for upper reference range is 0.045 ug/L.  Troponin values in   the range of 0.045 - 0.120 ug/L may be associated with risks of adverse   clinical events.     CBC with platelets differential   Result Value Ref Range    WBC 6.9 4.0 - 11.0 10e9/L    RBC Count 4.30 3.8 - 5.2 10e12/L    Hemoglobin 13.4 11.7 - 15.7 g/dL    Hematocrit 40.7 35.0 - 47.0 %    MCV 95 78 - 100 fl    MCH 31.2 26.5 - 33.0 pg    MCHC 32.9 31.5 - 36.5 g/dL    RDW 14.0 10.0 - 15.0 %    Platelet Count 224 150 - 450 10e9/L    Diff Method Automated Method     % Neutrophils 58.4 %    % Lymphocytes 25.4 %    % Monocytes 11.5 %    % Eosinophils 3.8 %    % Basophils 0.6 %    % Immature Granulocytes 0.3 %    Absolute Neutrophil 4.1 1.6 - 8.3 10e9/L    Absolute Lymphocytes 1.8 0.8 - 5.3 10e9/L    Absolute Monocytes 0.8 0.0 - 1.3 10e9/L    Absolute Eosinophils 0.3 0.0 - 0.7 10e9/L    Absolute Basophils 0.0 0.0 - 0.2 10e9/L    Abs Immature Granulocytes 0.0 0 - 0.4 10e9/L   Basic  metabolic panel   Result Value Ref Range    Sodium 143 133 - 144 mmol/L    Potassium 3.9 3.4 - 5.3 mmol/L    Chloride 110 (H) 94 - 109 mmol/L    Carbon Dioxide 24 20 - 32 mmol/L    Anion Gap 9 3 - 14 mmol/L    Glucose 92 70 - 99 mg/dL    Urea Nitrogen 17 7 - 30 mg/dL    Creatinine 0.67 0.52 - 1.04 mg/dL    GFR Estimate 80 >60 mL/min/1.7m2    GFR Estimate If Black >90   GFR Calc   >60 mL/min/1.7m2    Calcium 8.6 8.5 - 10.1 mg/dL   Care Transition RN/SW IP Consult    Narrative    Stacey Chappell RN     5/27/2017  3:20 PM  Care Transition Initial Assessment - RN  Reason For Consult: discharge planning   Met with: Patient., spoke to her son, Ernesto on the phone.  DATA  Active Problems:    Essential hypertension    Benign tumor of brain (H)    Macular degeneration    GERD (gastroesophageal reflux disease)    Glaucoma    Unsteady gait    Fall    Falls       Primary Care Clinic Name: [facility]  Primary Care MD Name: Devi May APRN CNP  Contact information and PCP information verified: Yes      ASSESSMENT  Cognitive Status: awake and alert.             Lives With: facility resident  Living Arrangements: assisted living                 Insurance Concerns: No Insurance issues identified          This writer met with pt ,spoke to her son, Ernesto [phone] on   the phone, introduced self and role.       PLAN    [phone], fax   [phone]. Currently her services include escorts to meals   (she has been walking with walker), assistance with dressing and   hearing aid. Patient's son states they will increase services to   include medication administration and more frequent checks with   bathroom assistance. Message left at the [facility] nursing   office requesting call back.   [facility] also to be added with [facility] ([phone]   Fax: [phone]) as preferred service provider. Patient's son   states they would like to bring her back  to the [facility] with increased   services and they will be exploring LTC options at [facility].     Addendum: received call back message from [facility] Nurse Earlene   [phone] stating services for patient can be increased, the   [facility] staff will meet with patient and her son upon her return   to confirm plan.         Stacey Chappell -[phone]       Palliative Care Adult IP Consult: assist with goals of care - suspected stroke, family deferred MRI; Consultant may enter orders: Yes; Patient to be seen: Routine - within 24 hours    Magui Alfaro APRN CNP     5/31/2017  4:57 PM  Palliative Care Consultation Note   Date of Service:5/31/2017  Admission Date/Time: 5/26/2017  Primary Care Provider: Devi May  Consulting Provider: Magui Bentley, has asked us to see Angela Esposito to help with   goals of care.    I met with the patient and her son, daughter and daughter in law.  Primary palliative care diagnosis:  . Fall with history of   unsteady gaits, suspected new subacute stroke   Consulted with hospitalist, RN    SUMMARY/HPI: Angela Esposito is a 89 year old woman admitted   with multiple falls. She has longstanding history of unsteady   gait, but with marked worsening in recent days. No clear lab   abnormalities, no noted arrhythmia. Cause was suspected to be   subacute stroke, but MRI was declined by the family given   Angela's advanced age and goals of care being primarily   comfort. This would not change the course of treatment.     She lives in assisted living and has also had a history of   nighttime confusion and hallucinations. This is also worsening.   Last night she was quite agitated, was given a dose of Zyprexa   which was effective, but has been more sleepy today. She has   intermittently complained of some mild headache and sore neck,   relieved with acetaminophen. She previously had been ambulatory,   now needing 2 people to transfer.  "        ASSESSMENT, RECOMMENDATIONS, AND PLAN:  1. Symptom Management    Pain, mild headache -- using acetaminophen. Suggest scheduling   this at the NH    Agitation/delirium -- Zyprexa effective,  dose reduced to 1.25 mg    2. Goals of Care  -Angela has dementia which limits her ability to participate   in goals of care conversation, but she does note that at her age,   she has lived \"longer than I cared to.\"   -Her family are all in agreement that the goal of care is not   life prolongation, but rather comfort, well being, safety. The   are familiar with hospice and are requesting hospice services in   the long term care facility (sounds like [facility]   tomorrow). Referral has been placed.       3. ACP    -Current code status is DNR/DNI. Discussion of code status   -confirmed.  - She is NOT capable of making medical decisions on her own   behalf.    4. Psychosocial/Emotional/Spiritual support  . One son and one daughter both live locally, both very   involved and supportive. She is one of 17 children. [place]   [place]. Recently living at [facility].   Michelle, enjoys phone calls from her . 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     5. Prognosis  - Mrs Esposito is medically appropriate for hospice if she pursues   symptom focused care.     Palliative performance scale: 30%  Ambulation-TOTALLY BEDBOUND;   Activity-UNABLE TO DO ANY ACTIVITY; Evidence of Disease-EXTENSIVE   DISEASE; Self care-TOTAL CARE; Intake-NORMAL or REDUCED;   Conscious Level-FULL or DROWSY +/- CONFUSION  -Palliative Performance Scale ( 0=death, 100= full function)   Domains of PPS = ambulation, activity level, evidence of disease,   self-care, intake, level of consciousness.    Thank you for allowing me to participate in the care of Angela Esposito.  Palliative Care will continue to follow.      ALICE [facility] Palliative Care  pager " [phone]    REVIEW OF SYSTEMS:   Pain: mild headache, relieved w/ acetaminophne. Stiff neck, also   mild  Dyspnea: no  Nausea: no  Anorexia: no  Constipation: no  Delirium: yes. Hallucinations/deliusions sometimes very upsetting   to her. Ongoing for years, worsening  Insomnia: no  Depression: no  Anxiety: no    PAST MEDICAL HISTORY:   Patient Active Problem List   Diagnosis     Disorder of bone and cartilage     Diverticulosis of large intestine     Essential hypertension     Polyneuropathy in other diseases classified elsewhere (H)     Personal history of benign neoplasm of brain     Generalized osteoarthrosis, unspecified site     Personal history of fall     Fear     DDD (degenerative disc disease), lumbar     Simple dental caries     Benign tumor of brain (H)     Sensorineural hearing loss, bilateral     Vertigo     CARDIOVASCULAR SCREENING; LDL GOAL LESS THAN 130     Lipid disorder     Macular degeneration     Retinal artery occlusion     Vision loss     Advanced directives, counseling/discussion     GERD (gastroesophageal reflux disease)     Pain in shoulder     At risk for falling     Glaucoma     Unsteady gait     Fall     Falls       PAST SURGICAL HISTORY:   Past Surgical History:   Procedure Laterality Date     C VAGINAL HYSTERECTOMY      Hysterectomy, Vaginal     CATARACT IOL, RT/LT      Cataract IOL RT     CATARACT IOL, RT/LT  7/98    Cataract IOL LT     CL AFF SURGICAL PATHOLOGY      leiomyoma  One ovary remains     COLONOSCOPY  2002    neg     SURGICAL HISTORY OF -   09/2004    Radiation Therapy Brain Tumor-Slow growing tumor and not   malignant as per patient       FAMILY HISTORY:  family history includes Breast Cancer in her sister; CANCER in   her brother, father, mother, and sister; Prostate Cancer in her   brother.  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 No     Social History Narrative       ALLERGIES:   Allergies   Allergen Reactions     Sulfa Drugs Unknown     CURRENT MEDICATIONS:  No current outpatient prescriptions on file.     PHYSICAL EXAM:  /51 (BP Location: Left arm)  Pulse 59  Temp 98.3  F (36.8    C) (Oral)  Resp 16  Ht 5' 1\" (1.549 m)  Wt 126 lb 5.2 oz   (57.3 kg)  SpO2 90%  BMI 23.87 kg/m2  EXAM:  EXAM:  Constitutional: alert pleasant no distress  NEURO: moves all extremities, speech is clear, oriented to self   and 'hospital' but is confused about people and events        LABS:  Albumin (g/dL)   Date Value   05/27/2017 3.4     No results found for: CREATININE  Urea Nitrogen (mg/dL)   Date Value   05/30/2017 17     Hemoglobin (g/dL)   Date Value   05/30/2017 13.4     Potassium (mmol/L)   Date Value   05/30/2017 3.9     No components found for: SODIUM      Total time spent on this visit: 111 minutes                   CBC RESULTS:   Recent Labs   Lab Test  05/30/17   0640  05/28/17   0645   WBC  6.9  7.4   RBC  4.30  4.73   HGB  13.4  14.6   HCT  40.7  44.8   MCV  95  95   MCH  31.2  30.9   MCHC  32.9  32.6   RDW  14.0  13.8   PLT  224  231       Last Basic Metabolic Panel:  Recent Labs   Lab Test  05/30/17   0640  05/28/17   0645   NA  143  142   POTASSIUM  3.9  3.8   CHLORIDE  110*  110*   MINOR  8.6  8.8   CO2  24  23   BUN  17  10   CR  0.67  0.61   GLC  92  97       Liver Function Studies -   Recent Labs   Lab Test  05/27/17   0900  03/14/11   1337   PROTTOTAL  6.5*  6.6*   ALBUMIN  3.4  3.9   BILITOTAL  0.7  0.3   ALKPHOS  118  74   AST  20  33   ALT  16  10       TSH   Date Value Ref Range Status   03/17/2014 1.68 0.4 - 5.0 mU/L Final   03/12/2012 1.71 0.4 - 5.0 mU/L Final "   ]    Lab Results   Component Value Date    A1C 5.7 03/17/2014    A1C 5.5 03/12/2012       ASSESSMENT/PLAN:  Encounter Diagnoses   Name Primary?     Benign neoplasm of brain, unspecified brain region (H) Yes     Macular degeneration      Blind      Difficulty walking      Fall      Knee pain      Hospice care patient      Orders:  D/c glucosamine   O/w The current medical regimen is effective;  continue present plan and medications.    Information reviewed:  Medications, vital signs, orders, nursing notes, problem list, hospital information. Total time spent with patient visit was 35 min including patient visit and review of past records. Greater than 50% of total time spent with counseling and coordinating care.    Electronically signed by:  ALICE Galloway CNP

## 2017-06-08 NOTE — PROGRESS NOTES
[facility]    Chief Complaint   Patient presents with     Nursing Home Acute       HPI:    Angela Esposito is a 89 year old  (12/9/1916), who is being seen today for an episodic care visit at [facility] .  HPI information obtained from: facility chart records, facility staff and [facility] chart review.Today's concern is:c  Here to see patient at request of nursing    Cerebral infarction, unspecified mechanism (H)  Lethargy  Patient appears to have days when she is awake but also days where she sleeps thru all meals and actitivity.  Hearing loss limits communication.  Able to participate with conversation with quiet environment and 1:1.  Attention.  Nursing reports patient not sleeping a night and think that adds to daytime lethargy.  Hospice request sleep aid  Pain  On prn pain meds but patient cries with transfers or ADLs  Hospice here and recommends scheduling pain medications  Hx of recurring falls some with injury or fracture.  Two recent falls with increased knee pain.    Hospice care patient       ALLERGIES: Sulfa drugs  Past Medical, Surgical, Family and Social History reviewed and updated in [facility].    Current Outpatient Prescriptions   Medication Sig Dispense Refill     OLANZapine (ZYPREXA) 2.5 MG tablet Take 0.5 tablets (1.25 mg) by mouth daily 60 tablet 0     Cyanocobalamin (VITAMIN B-12 PO) Take 1 tablet by mouth daily       CRANBERRY PO Take 1 capsule by mouth daily       multivitamin, therapeutic with minerals (MULTI-VITAMIN) TABS tablet Take 1 tablet by mouth daily Tab-A-Burak 90 tablet 3     ketoconazole (NIZORAL) 2 % shampoo Apply to the affected area and wash off after 5 minutes. 120 mL 1     order for DME Equipment being ordered: Wheelchair 1 Units 0     latanoprost (XALATAN) 0.005 % ophthalmic solution Place 1 drop into the right eye At Bedtime        omeprazole 20 MG tablet Take one capsule by mouth once daily 30-60 minutes before a meal. 90 tablet 1      "prednisoLONE acetate (PRED FORTE) 1 % ophthalmic suspension Place 1 drop into the right eye every 3 days        Glucosamine-Chondroit-Vit C-Mn (GLUCOSAMINE CHONDROITIN COMPLX) TABS Take 1 tablet by mouth 3 times daily.       TYLENOL CAPS 500 MG OR 1 CAPSULE by mouth three times daily       Medications reviewed:  Medications reconciled to facility chart and changes were made to reflect current medications as identified as above med list. Below are the changes that were made:   Medications stopped since last EPIC medication reconciliation:   There are no discontinued medications.    Medications started since last [facility] medication reconciliation:  No orders of the defined types were placed in this encounter.       REVIEW OF SYSTEMS:  Unobtainable secondary to cognitive impairment or aphasia.    Physical Exam:  /80  Pulse 68  Temp 98.7  F (37.1  C)  Resp 16  Ht 5' 1\" (1.549 m)  Wt 125 lb (56.7 kg)  SpO2 92%  BMI 23.62 kg/m2  GENERAL APPEARANCE:  Sleepy , in no distress,    ENT:  [place], oral mucous membranes moist  EYES:  blind  RESP:  lungs clear to auscultation , no respiratory distress, diminished breath sounds    CV:  regular rate and rhythm, no murmur, rub, or gallop, no edema  ABDOMEN:  bowel sounds normal, soft, non-tender  M/S:   Gait and station abnormal up in w/c  frail BAXTER   SKIN:  Pale w/d  PSYCH:  oriented X 3, insight and judgement impaired, memory impaired , affect and mood normal      Recent Labs:    CBC RESULTS:   Recent Labs   Lab Test  05/30/17   0640  05/28/17   0645   WBC  6.9  7.4   RBC  4.30  4.73   HGB  13.4  14.6   HCT  40.7  44.8   MCV  95  95   MCH  31.2  30.9   MCHC  32.9  32.6   RDW  14.0  13.8   PLT  224  231       Last Basic Metabolic Panel:  Recent Labs   Lab Test  05/30/17   0640  05/28/17   0645   NA  143  142   POTASSIUM  3.9  3.8   CHLORIDE  110*  110*   MINOR  8.6  8.8   CO2  24  23   BUN  17  10   CR  0.67  0.61   GLC  92  97       Liver Function Studies -   Recent Labs   Lab " Test  05/27/17   0900  03/14/11   1337   PROTTOTAL  6.5*  6.6*   ALBUMIN  3.4  3.9   BILITOTAL  0.7  0.3   ALKPHOS  118  74   AST  20  33   ALT  16  10       TSH   Date Value Ref Range Status   03/17/2014 1.68 0.4 - 5.0 mU/L Final   03/12/2012 1.71 0.4 - 5.0 mU/L Final   ]    Lab Results   Component Value Date    A1C 5.7 03/17/2014    A1C 5.5 03/12/2012           Assessment/Plan:     Cerebral infarction, unspecified mechanism (H)  Lethargy  Pain  Hospice care patient      Orders:  1.  Trazadone 25 mg po q hs.  MRX1   2.  Discontinue Zyprexa.  3.  MS04 2.5 mg po q 6 hours.      Total time spent with patient visit was 25 min including patient visit and review of past records. Greater than 50% of total time spent with counseling and coordinating care.       Electronically signed by  Helen Vallejo CMA

## 2017-06-09 PROBLEM — Z51.5 HOSPICE CARE PATIENT: Status: ACTIVE | Noted: 2017-01-01

## 2017-06-20 NOTE — TELEPHONE ENCOUNTER
Hospice Update-No Response Required  IDT NOTE BY: Tequila Kirk, RN  PRIMARY PROVIDER: Dr. Theodore Bernabe  PRIMARY DX: Cerebrovascular disease  CO-MORBIDITIES: dementia, chronic pain, degenerative disk disease  POLST: DNR/DNI  CHANGES IN STATUS SINCE LAST IDT: Increased confusion. Sleeping 18+ hours per day. Now requiring a full mechanical lift for transfers. Broda chair for positioning/locomotion. Right knee pain status post fall-morphine scheduled.  Intake poor- consuming bites to 25% with meal refusals documented by facility staff. Requires intermittent hand feeding assist. Episode of nausea/vomiting x1. Intermittent resistiveness/combativeness with cares.   MED CHANGES: Supplements d/c'd. Senna S prn activated. Compazine prn nausea/vomiting. Morphine 2.5mg scheduled Q 6hrs. Zyprexa d/c'd. Trazodone 25mg at HS, may repeat x1.   PATIENT GOALS: to be comfortable  FAMILY GOALS: to keep pt comfortable through EOL  SMART GOAL: To be free of falls  PLAN FOR NEXT 2 WEEKS: SNV 2x/week for evaluation of comfort/safety, sx management, and education on disease progression/EOL, MSW 1-3x/month. HHA 2x/week for ADL support. Massage therapy.  and volunteer declined

## 2017-06-26 NOTE — MR AVS SNAPSHOT
"              After Visit Summary   2017    Angela Esposito    MRN: [id number]           Patient Information     Date Of Birth          1916        Visit Information        Provider Department      2017 2:30 PM Lola Paula AuD [facility]        Today's Diagnoses     Sensorineural hearing loss, bilateral    -  1       Follow-ups after your visit        Who to contact     If you have questions or need follow up information about today's clinic visit or your schedule please contact [facility] directly at [phone].  Normal or non-critical lab and imaging results will be communicated to you by [employer], letter or phone within 4 business days after the clinic has received the results. If you do not hear from us within 7 days, please contact the clinic through [employer] or phone. If you have a critical or abnormal lab result, we will notify you by phone as soon as possible.  Submit refill requests through [employer] or call your pharmacy and they will forward the refill request to us. Please allow 3 business days for your refill to be completed.          Additional Information About Your Visit        MyChart Information     [employer] lets you send messages to your doctor, view your test results, renew your prescriptions, schedule appointments and more. To sign up, go to [email or web address] . Click on \"Log in\" on the left side of the screen, which will take you to the Welcome page. Then click on \"Sign up Now\" on the right side of the page.     You will be asked to enter the access code listed below, as well as some personal information. Please follow the directions to create your username and password.     Your access code is: [email or web address]  Expires: 2017  3:04 PM     Your access code will  in 90 days. If you need help or a new code, please call your [facility] or [phone].        Care EveryWhere ID     This is your Care EveryWhere ID. This could be used " by other organizations to access your [facility] medical records  [id number]         Blood Pressure from Last 3 Encounters:   06/08/17 146/80   06/06/17 146/80   06/01/17 179/56    Weight from Last 3 Encounters:   06/08/17 125 lb (56.7 kg)   06/06/17 122 lb (55.3 kg)   05/27/17 126 lb 5.2 oz (57.3 kg)              We Performed the Following     HEARING AID CHECK/NO CHARGE        Primary Care Provider Office Phone # Fax #    Devi Jorge May, ALICE [phone]       [facility] [address] [address]        Equal Access to Services     NICK ARMIJO : Hadii ana guardadoo Soyo, waaxda luqadaha, qaybta kaalmada adeegyada, marcus winter. So [facility] [phone].    ATENCIÓN: Si habla español, tiene a mead disposición servicios gratuitos de asistencia lingüística. Llame al [phone].    We comply with applicable federal civil rights laws and [place] laws. We do not discriminate on the basis of race, color, national origin, age, disability sex, sexual orientation or gender identity.            Thank you!     Thank you for choosing [facility]  for your care. Our goal is always to provide you with excellent care. Hearing back from our patients is one way we can continue to improve our services. Please take a few minutes to complete the written survey that you may receive in the mail after your visit with us. Thank you!             Your Updated Medication List - Protect others around you: Learn how to safely use, store and throw away your medicines at www.disposemymeds.org.          This list is accurate as of: 6/26/17  3:04 PM.  Always use your most recent med list.                   Brand Name Dispense Instructions for use Diagnosis    CRANBERRY PO      Take 1 capsule by mouth daily        GLUCOSAMINE CHONDROITIN COMPLX Tabs      Take 1 tablet by mouth 3 times daily.        ketoconazole 2 % shampoo    NIZORAL    120 mL     Apply to the affected area and wash off after 5 minutes.    Seborrheic dermatitis of scalp       latanoprost 0.005 % ophthalmic solution    XALATAN     Place 1 drop into the right eye At Bedtime        MORPHINE SULFATE PO      Take 2 mg by mouth every 6 hours        multivitamin, therapeutic with minerals Tabs tablet     90 tablet    Take 1 tablet by mouth daily Tab-A-Burak    Encounter for herb and vitamin supplement management       omeprazole 20 MG tablet     90 tablet    Take one capsule by mouth once daily 30-60 minutes before a meal.    GERD (gastroesophageal reflux disease)       order for DME     1 Units    Equipment being ordered: Wheelchair    Falls frequently, DDD (degenerative disc disease), lumbar       prednisoLONE acetate 1 % ophthalmic susp    PRED FORTE     Place 1 drop into the right eye every 3 days        TRAZODONE HCL PO      Take 25 mg by mouth At Bedtime        TYLENOL CAPS 500 MG OR      1 CAPSULE by mouth three times daily        VITAMIN B-12 PO      Take 1 tablet by mouth daily

## 2017-06-26 NOTE — PROGRESS NOTES
89 year old female requests repair of her 2014 Phonak Virto Q50-HS hearing aid.     Examination reveals the battery door is broken. Replaced battery door and hearing aid was not working.    Sent hearing aid in for warranty repair. See chart in the hearing aid room.     Patient's son will be contacted when it is ready to be picked up.    Lola SOLOMON, #[id number]

## 2017-07-03 NOTE — TELEPHONE ENCOUNTER
Hospice Update-No Response Required  IDT NOTE BY: Tequila Kirk, SIM  PRIMARY PROVIDER: Dr. Theodore Bernabe  PRIMARY DX: Cerebrovascular disease  CO-MORBIDITIES: dementia, chronic pain, degenerative disk disease  POLST: DNR/DNI  CHANGES IN STATUS SINCE LAST IDT: Increased confusion. Sleeping 18+ hours per day. Now requiring a full mechanical lift for transfers. Broda chair for positioning/locomotion. Pain managed with scheduled morphine. Intake poor- consuming bites to 25% with meal refusals documented by facility staff. Requires intermittent hand feeding assist. Ongoing nausea reported accompanied by dizziness-scopolamine patch scheduled.   MED CHANGES: Scopolamine patch. Senna S 2 tabs Q AM, 1 tab Q PM. Scheduled Tylenol d/c'd.   PATIENT GOALS: to be comfortable  FAMILY GOALS: to keep pt comfortable through EOL  SMART GOAL: To be free of falls  PLAN FOR NEXT 2 WEEKS: SNV 2x/week for evaluation of comfort/safety, sx management, and education on disease progression/EOL, MSW 1-3x/month. HHA 2x/week for ADL support. Massage therapy.  and volunteer declined.

## 2017-07-05 NOTE — PATIENT INSTRUCTIONS
Orders:  1.  Scopolamine patch q 72 hours as pt tolerates.  2.  Senna S 2 po qd.  3.  Nystatin S and S.

## 2017-07-05 NOTE — PROGRESS NOTES
[facility]    Chief Complaint   Patient presents with     RECHECK       HPI:    Angela Esposito is a 89 year old  (12/9/1916), who is being seen today for an episodic care visit at [facility] .  HPI information obtained from: facility chart records, facility staff and [facility] chart review.Today's concern is:  Here to see patient at request of nursing    Nausea  Dizziness  Patient has repeated episodes of nausea and c/o feeling dizzy with any change in position  Hospice states they would like to try scopolamine patch to see if that would hlep    Constipation  Severe  No bowel movement x 4 days    Thrush   white patchy tongue, oral cavity dry with patchy areas     Hospice care patient      ALLERGIES: Sulfa drugs  Past Medical, Surgical, Family and Social History reviewed and updated in [facility].    Current Outpatient Prescriptions   Medication Sig Dispense Refill     scopolamine (TRANSDERM) 72 hr patch Place 1 patch onto the skin every 72 hours       senna-docusate (SENOKOT-S;PERICOLACE) 8.6-50 MG per tablet Take 2 tablets by mouth daily       nystatin (MYCOSTATIN) 841862 UNIT/ML suspension Take 500,000 Units by mouth 4 times daily       TRAZODONE HCL PO Take 25 mg by mouth At Bedtime       MORPHINE SULFATE PO Take 2 mg by mouth every 6 hours       Cyanocobalamin (VITAMIN B-12 PO) Take 1 tablet by mouth daily       CRANBERRY PO Take 1 capsule by mouth daily       multivitamin, therapeutic with minerals (MULTI-VITAMIN) TABS tablet Take 1 tablet by mouth daily Tab-A-Burak 90 tablet 3     ketoconazole (NIZORAL) 2 % shampoo Apply to the affected area and wash off after 5 minutes. 120 mL 1     order for DME Equipment being ordered: Wheelchair 1 Units 0     latanoprost (XALATAN) 0.005 % ophthalmic solution Place 1 drop into the right eye At Bedtime        omeprazole 20 MG tablet Take one capsule by mouth once daily 30-60 minutes before a meal. 90 tablet 1     prednisoLONE acetate (PRED  "FORTE) 1 % ophthalmic suspension Place 1 drop into the right eye every 3 days        Glucosamine-Chondroit-Vit C-Mn (GLUCOSAMINE CHONDROITIN COMPLX) TABS Take 1 tablet by mouth 3 times daily.       TYLENOL CAPS 500 MG OR 1 CAPSULE by mouth three times daily       Medications reviewed:  Medications reconciled to facility chart and changes were made to reflect current medications as identified as above med list. Below are the changes that were made:   Medications stopped since last EPIC medication reconciliation:   There are no discontinued medications.    Medications started since last [facility] medication reconciliation:  No orders of the defined types were placed in this encounter.         REVIEW OF SYSTEMS:  Unobtainable secondary to cognitive impairment or aphasia.    Physical Exam:  /59  Pulse 68  Temp 98.7  F (37.1  C)  Resp 16  Ht 5' 1\" (1.549 m)  Wt 112 lb (50.8 kg)  SpO2 92%  BMI 21.16 kg/m2  GENERAL APPEARANCE:  Sleepy , in no distress,    ENT:  [place], oral mucous membranes moist  EYES:  blind  RESP:  lungs clear to auscultation , no respiratory distress, diminished breath sounds    CV:  regular rate and rhythm, no murmur,   no edema  ABDOMEN:  bowel sounds normal, soft, non-tender  M/S:   Gait and station abnormal up in w/c  frail BAXTER   SKIN:  Pale w/d  PSYCH:  oriented X 3, insight and judgement impaired, memory impaired , affect and mood normal      Recent Labs:    CBC RESULTS:   Recent Labs   Lab Test  05/30/17   0640  05/28/17   0645   WBC  6.9  7.4   RBC  4.30  4.73   HGB  13.4  14.6   HCT  40.7  44.8   MCV  95  95   MCH  31.2  30.9   MCHC  32.9  32.6   RDW  14.0  13.8   PLT  224  231       Last Basic Metabolic Panel:  Recent Labs   Lab Test  05/30/17   0640  05/28/17   0645   NA  143  142   POTASSIUM  3.9  3.8   CHLORIDE  110*  110*   MINOR  8.6  8.8   CO2  24  23   BUN  17  10   CR  0.67  0.61   GLC  92  97       Liver Function Studies -   Recent Labs   Lab Test  05/27/17   0900  03/14/11   1337 "   PROTTOTAL  6.5*  6.6*   ALBUMIN  3.4  3.9   BILITOTAL  0.7  0.3   ALKPHOS  118  74   AST  20  33   ALT  16  10       TSH   Date Value Ref Range Status   03/17/2014 1.68 0.4 - 5.0 mU/L Final   03/12/2012 1.71 0.4 - 5.0 mU/L Final   ]    Lab Results   Component Value Date    A1C 5.7 03/17/2014    A1C 5.5 03/12/2012     Assessment/Plan:     Nausea  Dizziness  Constipation  Thrush  Hospice care patient      Orders:  1.  Scopolamine patch q 72 hours as pt tolerates.  2.  Senna S 2 po qd.  3.  Nystatin S and S.      Total time spent with patient visit was 25 min including patient visit and review of past records. Greater than 50% of total time spent with counseling and coordinating care.       Electronically signed by  ALICE Galloway CNP

## 2023-04-18 NOTE — NURSING NOTE
Hospital ER visit /In-patient: None  Previous Consults: YES:  In past for [facility] Tumor in [id number]     Unknown